# Patient Record
Sex: MALE | Race: WHITE | HISPANIC OR LATINO | Employment: OTHER | URBAN - METROPOLITAN AREA
[De-identification: names, ages, dates, MRNs, and addresses within clinical notes are randomized per-mention and may not be internally consistent; named-entity substitution may affect disease eponyms.]

---

## 2017-01-13 ENCOUNTER — TELEPHONE (OUTPATIENT)
Dept: MEDICAL GROUP | Facility: PHYSICIAN GROUP | Age: 67
End: 2017-01-13

## 2017-01-13 DIAGNOSIS — I82.4Y9 ACUTE DEEP VEIN THROMBOSIS (DVT) OF PROXIMAL VEIN OF LOWER EXTREMITY, UNSPECIFIED LATERALITY (HCC): ICD-10-CM

## 2017-01-13 NOTE — TELEPHONE ENCOUNTER
Okay refill is been sent to pharmacy. Recommend that he attempted to get up today, if he has any difficulty will need to get him some samples until he can get it filled.

## 2017-01-13 NOTE — TELEPHONE ENCOUNTER
1. Caller Name: Ravindra Palma                                           Call Back Number: [phone] (home)         Patient approves a detailed voicemail message: N\A    Pt states he takes Xerelto and cannot get prescribing doctor to refill. He was told to have his PCP refill it.  He states he has 1 pill left

## 2017-01-20 ENCOUNTER — ANTICOAGULATION VISIT (OUTPATIENT)
Dept: MEDICAL GROUP | Facility: PHYSICIAN GROUP | Age: 67
End: 2017-01-20
Payer: MEDICARE

## 2017-01-20 DIAGNOSIS — I82.4Y9 ACUTE DEEP VEIN THROMBOSIS (DVT) OF PROXIMAL VEIN OF LOWER EXTREMITY, UNSPECIFIED LATERALITY (HCC): ICD-10-CM

## 2017-01-20 LAB — INR PPP: 1.1 (ref 2–3.5)

## 2017-01-20 PROCEDURE — 99211 OFF/OP EST MAY X REQ PHY/QHP: CPT | Performed by: FAMILY MEDICINE

## 2017-01-20 PROCEDURE — 85610 PROTHROMBIN TIME: CPT | Performed by: FAMILY MEDICINE

## 2017-01-20 NOTE — MR AVS SNAPSHOT
Ravindra Palma   2017 11:30 AM   Anticoagulation Visit   MRN: [id number]    Department:  [facility]   Dept Phone:  [phone]    Description:  Male : 1950   Provider:  Isai Berry, PHARMD           Allergies as of 2017     Allergen Noted Reactions    Nkda [No Known Drug Allergy] 2012         You were diagnosed with     Acute deep vein thrombosis (DVT) of proximal vein of lower extremity, unspecified laterality (CMS-HCC)   [4610435]         Vital Signs     Smoking Status                   Former Smoker           Basic Information     Date Of Birth Sex Race Ethnicity Preferred Language    1950 Male  or   Origin (Uzbek,South Sudanese,Bolivian,Nam, etc) English      Your appointments     Mar 31, 2017 11:30 AM   Anti-Coag Routine with [place] PHARMACIST   [facility] ([address] [address]   [phone]              Problem List              ICD-10-CM Priority Class Noted - Resolved    Cervical stenosis of spine M48.02   2012 - Present    Spinal stenosis in cervical region M48.02   2012 - Present    Type II or unspecified type diabetes mellitus without mention of complication, uncontrolled E11.65   2013 - Present    Hypothyroidism E03.9   2013 - Present    Dyslipidemia E78.5   2013 - Present    Former smoker Z87.891   2016 - Present    Non compliance with medical treatment Z91.19   2016 - Present    Deep vein thrombosis (HCC) [I82.409] I82.409   2016 - Present      Health Maintenance        Date Due Completion Dates    DIABETES MONOFILAMENT / LE EXAM 1950 ---    RETINAL SCREENING 3/4/1966 ---    IMM DTaP/Tdap/Td Vaccine (1 - Tdap) 3/4/1967 ---    COLONOSCOPY 3/4/1998 ---    IMM ZOSTER VACCINE 3/4/2008 ---    IMM PNEUMOCOCCAL 65+ (ADULT) LOW/MEDIUM RISK SERIES (1 of 2 - PCV13) 3/4/2013 ---    URINE ACR / MICROALBUMIN 2014,  8/17/2012, 4/27/2012    IMM INFLUENZA (1) 9/1/2016 10/1/2012    A1C SCREENING 6/12/2017 12/12/2016, 1/29/2013, 8/17/2012    FASTING LIPID PROFILE 12/12/2017 12/12/2016, 1/29/2013, 11/12/2012, 8/17/2012, 4/27/2012    SERUM CREATININE 12/12/2017 12/12/2016, 1/29/2013, 1/12/2013, 12/14/2012, 11/12/2012, 8/17/2012, 4/27/2012            Results     POCT Protime      Component    INR    1.1    Comment:     139 818-12 11/2017 ic valid                        Current Immunizations     Influenza TIV (IM) 10/1/2012      Below and/or attached are the medications your provider expects you to take. Review all of your home medications and newly ordered medications with your provider and/or pharmacist. Follow medication instructions as directed by your provider and/or pharmacist. Please keep your medication list with you and share with your provider. Update the information when medications are discontinued, doses are changed, or new medications (including over-the-counter products) are added; and carry medication information at all times in the event of emergency situations     Allergies:  NKDA - (reactions not documented)               Medications  Valid as of: January 20, 2017 - 11:40 AM    Generic Name Brand Name Tablet Size Instructions for use    Blood Glucose Monitoring Suppl (Misc) Blood Glucose Monitoring Suppl SUPPLIES Test strips order: Test strips for One Touch Ultra 2 meter. Sig: use BID and prn ssx high or low sugar.        Cholecalciferol   Take  by mouth every day. Unsure of dose         Gabapentin (Cap) NEURONTIN 300 MG Take 300 mg by mouth 3 times a day. 1 in the AM, 1 afternoon, and 2 at HS         Glimepiride (Tab) AMARYL 2 MG Take 1 Tab by mouth every morning.        Lancets (Misc) Lancets  Lancets order: Lancets for One Touch Ultra 2 meter. Sig: use BID and prn ssx high or low sugar.        Levothyroxine Sodium (Tab) SYNTHROID 88 MCG Take 1 Tab by mouth Every morning on an empty stomach.        Lisinopril (Tab)  PRINIVIL 2.5 MG Take 1 Tab by mouth every day.        MetFORMIN HCl (Tab) GLUCOPHAGE 1000 MG Take 1 Tab by mouth 2 times a day, with meals.        Rivaroxaban (Tab) XARELTO 20 MG Take 1 Tab by mouth with dinner.        Simvastatin (Tab) ZOCOR 20 MG Take 1 Tab by mouth every evening.        .                 Medicines prescribed today were sent to:     [facility] [address] [place] [address], [place] [facility]    [address] [facility] [place] [address]    Phone: [phone] Fax: [phone]    Open 24 Hours?: No      Medication refill instructions:       If your prescription bottle indicates you have medication refills left, it is not necessary to call your provider’s office. Please contact your pharmacy and they will refill your medication.    If your prescription bottle indicates you do not have any refills left, you may request refills at any time through one of the following ways: The online Multimedia Plus | QuizScore system (except Urgent Care), by calling your provider’s office, or by asking your pharmacy to contact your provider’s office with a refill request. Medication refills are processed only during regular business hours and may not be available until the next business day. Your provider may request additional information or to have a follow-up visit with you prior to refilling your medication.   *Please Note: Medication refills are assigned a new Rx number when refilled electronically. Your pharmacy may indicate that no refills were authorized even though a new prescription for the same medication is available at the pharmacy. Please request the medicine by name with the pharmacy before contacting your provider for a refill.        Warfarin Dosing Calendar   January 2017 Details    Sun Mon Tue Wed Thu Fri Sat     1               2               3               4               5               6               7                 8               9               10               11               12               13                 14                 15               16               17               18               19               20   1.1      See details      21                 22               23               24               25               26               27               28                 29               30               31                    Date Details   01/20 This INR check   INR: 1.1   139 818-12 11/2017 ic valid                 Warfarin Dosing Calendar   February 2017 Details    Sun Mon Tue Wed Thu Fri Sat        1               2               3               4                 5               6               7               8               9               10               11                 12               13               14               15               16               17               18                 19               20               21               22               23               24               25                 26               27               28                    Date Details   No additional details              Warfarin Dosing Calendar   March 2017 Details    Sun Mon Tue Wed Thu Fri Sat        1               2               3               4                 5               6               7               8               9               10               11                 12               13               14               15               16               17               18                 19               20               21               22               23               24               25                 26               27               28               29               30               31                 Date Details   No additional details    Date of next INR:  3/31/2017              happin! Access Code: [email or web address]  Expires: 1/27/2017  9:05 AM    happin!  A secure, online tool to manage your health information     [employer]’s happin!® is a secure, online tool that connects you  to your personalized health information from the privacy of your home -- day or night - making it very easy for you to manage your healthcare. Once the activation process is completed, you can even access your medical information using the Apcera alize, which is available for free in the Apple Alize store or Google Play store.     Apcera provides the following levels of access (as shown below):   My Chart Features   [facility] Doctor [facility]  Specialists [facility]  Urgent  Care Non-[facility]  Primary Care  Doctor   Email your healthcare team securely and privately 24/7 X X X    Manage appointments: schedule your next appointment; view details of past/upcoming appointments X      Request prescription refills. X      View recent personal medical records, including lab and immunizations X X X X   View health record, including health history, allergies, medications X X X X   Read reports about your outpatient visits, procedures, consult and ER notes X X X X   See your discharge summary, which is a recap of your hospital and/or ER visit that includes your diagnosis, lab results, and care plan. X X       How to register for Apcera:  1. Go to  [email or web address].  2. Click on the Sign Up Now box, which takes you to the New Member Sign Up page. You will need to provide the following information:  a. Enter your Apcera Access Code exactly as it appears at the top of this page. (You will not need to use this code after you’ve completed the sign-up process. If you do not sign up before the expiration date, you must request a new code.)   b. Enter your date of birth.   c. Enter your home email address.   d. Click Submit, and follow the next screen’s instructions.  3. Create a Apcera ID. This will be your Apcera login ID and cannot be changed, so think of one that is secure and easy to remember.  4. Create a Apcera password. You can change your password at any time.  5. Enter your Password Reset Question and Answer.  This can be used at a later time if you forget your password.   6. Enter your e-mail address. This allows you to receive e-mail notifications when new information is available in NVELO.  7. Click Sign Up. You can now view your health information.    For assistance activating your NVELO account, call [phone]

## 2017-01-20 NOTE — Clinical Note
Gen Godfrey, This patient will complete three months therapy 3-12-17.  I asked that he call and schedule f/u with you.  I let him know that a f/u US will need to be done as well.  Thanks. Isai

## 2017-01-20 NOTE — PROGRESS NOTES
Anticoagulation Summary as of 1/20/2017     INR goal    Selected INR 1.1 (1/20/2017)   Maintenance plan No maintenance plan   Plan last modified Isai Berry, PHARMD (12/9/2016)   Next INR check 3/31/2017   Target end date 3/12/2017    Indications   Deep vein thrombosis (HCC) [I82.409] [I82.409]         Anticoagulation Episode Summary     INR check location     Preferred lab     Send INR reminders to     Comments       Anticoagulation Care Providers     Provider Role Specialty Phone number    GINNY Roth Referring Family Medicine [phone]    [facility] Responsible  [phone]        Anticoagulation Patient Findings    Target end date:3/12/2017    Health Status Since Last Assessment   Patient denies any new relevant medical problems, ED visits or hospitalizations   Patient denies any embolic events (stroke/tia/systemic embolism)    Adherence with DOAC Therapy   Pt has NO missed any doses in the average week    Bleeding Risk Assessment     Negative Epistaxis   Pt denies any excessive or unusual bleeding/hematomas.  Pt denies any GI bleeds or hematemesis.  Pt denies any concerning daily headache or sub dural hematoma symptoms.     Pt denies any hematuria or abnormal vaginal bleeding.   Latest Hemoglobin WNL   ETOH overuse Negative     Creatinine Clearance/Renal Function     Latest ClCr >50 mL/min     Drug Interactions   ASA/other antiplatelets Negative   NSAID Negative   Other drug interactions Negative    Examination   Blood Pressure WNL   Symptomatic hypotension Negative   Significant gait impairment/imbalance/high risk for falls?     Final Assessment and Recommendations:   Patient appears stable from the anticoagulation staindpoint.     Benefits of continued DOAC therapy ouyweigh risks for this patient   Recommend pt continue with current DOAC therapy Xarelto 20mg one time daily (with dose adjustment)     Other Actions:  Will need f/u US and visit with PCP to determine LOT,  he will call to schedule    Follow up:   In clinic in two months depending on LOT, visit scheduled for Friday March 31, 2017 @ 11:30am    Isai Berry, JUANISD

## 2017-02-23 ENCOUNTER — OFFICE VISIT (OUTPATIENT)
Dept: MEDICAL GROUP | Facility: PHYSICIAN GROUP | Age: 67
End: 2017-02-23
Payer: MEDICARE

## 2017-02-23 VITALS
HEART RATE: 70 BPM | DIASTOLIC BLOOD PRESSURE: 78 MMHG | BODY MASS INDEX: 33.66 KG/M2 | SYSTOLIC BLOOD PRESSURE: 138 MMHG | OXYGEN SATURATION: 98 % | RESPIRATION RATE: 20 BRPM | HEIGHT: 65 IN | WEIGHT: 202 LBS | TEMPERATURE: 98.4 F

## 2017-02-23 DIAGNOSIS — J01.00 ACUTE NON-RECURRENT MAXILLARY SINUSITIS: Primary | ICD-10-CM

## 2017-02-23 PROCEDURE — 1100F PTFALLS ASSESS-DOCD GE2>/YR: CPT | Performed by: NURSE PRACTITIONER

## 2017-02-23 PROCEDURE — 3288F FALL RISK ASSESSMENT DOCD: CPT | Mod: 8P | Performed by: NURSE PRACTITIONER

## 2017-02-23 PROCEDURE — 0518F FALL PLAN OF CARE DOCD: CPT | Mod: 8P | Performed by: NURSE PRACTITIONER

## 2017-02-23 PROCEDURE — G8484 FLU IMMUNIZE NO ADMIN: HCPCS | Performed by: NURSE PRACTITIONER

## 2017-02-23 PROCEDURE — G8432 DEP SCR NOT DOC, RNG: HCPCS | Performed by: NURSE PRACTITIONER

## 2017-02-23 PROCEDURE — 1036F TOBACCO NON-USER: CPT | Performed by: NURSE PRACTITIONER

## 2017-02-23 PROCEDURE — 4040F PNEUMOC VAC/ADMIN/RCVD: CPT | Mod: 8P | Performed by: NURSE PRACTITIONER

## 2017-02-23 PROCEDURE — G8419 CALC BMI OUT NRM PARAM NOF/U: HCPCS | Performed by: NURSE PRACTITIONER

## 2017-02-23 PROCEDURE — 3017F COLORECTAL CA SCREEN DOC REV: CPT | Mod: 1P | Performed by: NURSE PRACTITIONER

## 2017-02-23 PROCEDURE — 99214 OFFICE O/P EST MOD 30 MIN: CPT | Performed by: NURSE PRACTITIONER

## 2017-02-23 RX ORDER — AZITHROMYCIN 250 MG/1
TABLET, FILM COATED ORAL
Qty: 6 TAB | Refills: 0 | Status: SHIPPED | OUTPATIENT
Start: 2017-02-23 | End: 2017-03-24

## 2017-02-23 RX ORDER — ALBUTEROL SULFATE 90 UG/1
2 AEROSOL, METERED RESPIRATORY (INHALATION) EVERY 6 HOURS PRN
Qty: 8.5 G | Refills: 3 | Status: SHIPPED | OUTPATIENT
Start: 2017-02-23 | End: 2017-04-24 | Stop reason: SDUPTHER

## 2017-02-23 ASSESSMENT — ENCOUNTER SYMPTOMS: COUGH: 1

## 2017-02-23 NOTE — MR AVS SNAPSHOT
"        Ravindra Morejonoval   2017 9:00 AM   Office Visit   MRN: [id number]    Department:  [facility]   Dept Phone:  [phone]    Description:  Male : 1950   Provider:  GINNY Roth           Reason for Visit     Cough with phlegm     Sinus Problem           Allergies as of 2017     Allergen Noted Reactions    Nkda [No Known Drug Allergy] 2012         You were diagnosed with     Acute non-recurrent maxillary sinusitis   [7067046]  -  Primary       Vital Signs     Blood Pressure Pulse Temperature Respirations Height Weight    138/78 mmHg 70 36.9 °C (98.4 °F) 20 1.651 m (5' 5\") 91.627 kg (202 lb)    Body Mass Index Oxygen Saturation Smoking Status             33.61 kg/m2 98% Former Smoker         Basic Information     Date Of Birth Sex Race Ethnicity Preferred Language    1950 Male  or   Origin (Chadian,Ugandan,Norwegian,Nam, etc) English      Your appointments     Mar 31, 2017 11:30 AM   Anti-Coag Routine with [place] [facility]ARMACIST   [facility] [place]    [address] [place] [address]   [phone]              Problem List              ICD-10-CM Priority Class Noted - Resolved    Cervical stenosis of spine M48.02   2012 - Present    Spinal stenosis in cervical region M48.02   2012 - Present    Type II or unspecified type diabetes mellitus without mention of complication, uncontrolled E11.65   2013 - Present    Hypothyroidism E03.9   2013 - Present    Dyslipidemia E78.5   2013 - Present    Former smoker Z87.891   2016 - Present    Non compliance with medical treatment Z91.19   2016 - Present    Deep vein thrombosis (HCC) [I82.409] I82.409   2016 - Present      Health Maintenance        Date Due Completion Dates    DIABETES MONOFILAMENT / LE EXAM 1950 ---    RETINAL SCREENING 3/4/1966 ---    IMM DTaP/Tdap/Td Vaccine (1 - Tdap) 3/4/1967 ---    COLONOSCOPY " 3/4/1998 ---    IMM ZOSTER VACCINE 3/4/2008 ---    IMM PNEUMOCOCCAL 65+ (ADULT) LOW/MEDIUM RISK SERIES (1 of 2 - PCV13) 3/4/2013 ---    URINE ACR / MICROALBUMIN 1/29/2014 1/29/2013, 8/17/2012, 4/27/2012    IMM INFLUENZA (1) 9/1/2016 10/1/2012    A1C SCREENING 6/12/2017 12/12/2016, 1/29/2013, 8/17/2012    FASTING LIPID PROFILE 12/12/2017 12/12/2016, 1/29/2013, 11/12/2012, 8/17/2012, 4/27/2012    SERUM CREATININE 12/12/2017 12/12/2016, 1/29/2013, 1/12/2013, 12/14/2012, 11/12/2012, 8/17/2012, 4/27/2012            Current Immunizations     Influenza TIV (IM) 10/1/2012      Below and/or attached are the medications your provider expects you to take. Review all of your home medications and newly ordered medications with your provider and/or pharmacist. Follow medication instructions as directed by your provider and/or pharmacist. Please keep your medication list with you and share with your provider. Update the information when medications are discontinued, doses are changed, or new medications (including over-the-counter products) are added; and carry medication information at all times in the event of emergency situations     Allergies:  NKDA - (reactions not documented)               Medications  Valid as of: February 23, 2017 - 10:03 AM    Generic Name Brand Name Tablet Size Instructions for use    Albuterol Sulfate (Aero Soln) albuterol 108 (90 BASE) MCG/ACT Inhale 2 Puffs by mouth every 6 hours as needed for Shortness of Breath.        Azithromycin (Tab) ZITHROMAX 250 MG Take 500mg day 1, then 250mg days 2-5.        Blood Glucose Monitoring Suppl (Misc) Blood Glucose Monitoring Suppl SUPPLIES Test strips order: Test strips for One Touch Ultra 2 meter. Sig: use BID and prn ssx high or low sugar.        Cholecalciferol   Take  by mouth every day. Unsure of dose         Gabapentin (Cap) NEURONTIN 300 MG Take 300 mg by mouth 3 times a day. 1 in the AM, 1 afternoon, and 2 at HS         Glimepiride (Tab) AMARYL 2 MG Take  1 Tab by mouth every morning.        Lancets (Misc) Lancets  Lancets order: Lancets for One Touch Ultra 2 meter. Sig: use BID and prn ssx high or low sugar.        Levothyroxine Sodium (Tab) SYNTHROID 88 MCG Take 1 Tab by mouth Every morning on an empty stomach.        Lisinopril (Tab) PRINIVIL 2.5 MG Take 1 Tab by mouth every day.        MetFORMIN HCl (Tab) GLUCOPHAGE 1000 MG Take 1 Tab by mouth 2 times a day, with meals.        Rivaroxaban (Tab) XARELTO 20 MG Take 1 Tab by mouth with dinner.        Simvastatin (Tab) ZOCOR 20 MG Take 1 Tab by mouth every evening.        .                 Medicines prescribed today were sent to:     [facility] [address] [place] [address]    Phone: [phone] Fax: [phone]    Open 24 Hours?: No      Medication refill instructions:       If your prescription bottle indicates you have medication refills left, it is not necessary to call your provider’s office. Please contact your pharmacy and they will refill your medication.    If your prescription bottle indicates you do not have any refills left, you may request refills at any time through one of the following ways: The online Artspace system (except Urgent Care), by calling your provider’s office, or by asking your pharmacy to contact your provider’s office with a refill request. Medication refills are processed only during regular business hours and may not be available until the next business day. Your provider may request additional information or to have a follow-up visit with you prior to refilling your medication.   *Please Note: Medication refills are assigned a new Rx number when refilled electronically. Your pharmacy may indicate that no refills were authorized even though a new prescription for the same medication is available at the pharmacy. Please request the medicine by name with the pharmacy before contacting your provider for a refill.            Mint Access Code: [email or web address]  Expires: 2/25/2017 11:44 AM    Mint  A secure, online tool to manage your health information     [employer]’s Mint® is a secure, online tool that connects you to your personalized health information from the privacy of your home -- day or night - making it very easy for you to manage your healthcare. Once the activation process is completed, you can even access your medical information using the Mint alize, which is available for free in the Apple Alize store or Google Play store.     Mint provides the following levels of access (as shown below):   My Chart Features   [facility] Primary Care Doctor [facility]  Specialists [facility]  Urgent  Care Non-[facility]  Primary Care  Doctor   Email your healthcare team securely and privately 24/7 X X X    Manage appointments: schedule your next appointment; view details of past/upcoming appointments X      Request prescription refills. X      View recent personal medical records, including lab and immunizations X X X X   View health record, including health history, allergies, medications X X X X   Read reports about your outpatient visits, procedures, consult and ER notes X X X X   See your discharge summary, which is a recap of your hospital and/or ER visit that includes your diagnosis, lab results, and care plan. X X       How to register for Mint:  1. Go to  [email or web address].  2. Click on the Sign Up Now box, which takes you to the New Member Sign Up page. You will need to provide the following information:  a. Enter your Mint Access Code exactly as it appears at the top of this page. (You will not need to use this code after you’ve completed the sign-up process. If you do not sign up before the expiration date, you must request a new code.)   b. Enter your date of birth.   c. Enter your home email address.   d. Click Submit, and follow the next screen’s instructions.  3. Create a Mint ID. This will be your Mint  login ID and cannot be changed, so think of one that is secure and easy to remember.  4. Create a BreconRidge password. You can change your password at any time.  5. Enter your Password Reset Question and Answer. This can be used at a later time if you forget your password.   6. Enter your e-mail address. This allows you to receive e-mail notifications when new information is available in BreconRidge.  7. Click Sign Up. You can now view your health information.    For assistance activating your BreconRidge account, call [phone]

## 2017-02-23 NOTE — PROGRESS NOTES
"Subjective:      Ravindra Palma is a 66 y.o. male who presents with Cough and Sinus Problem            Cough    Sinus Problem  Associated symptoms include coughing.   Ravindra is here today to discuss the following problem:    1. Acute non-recurrent maxillary sinusitis  Patient states that both his grandson and wife recently seen for similar symptoms and her taking medication.  Patient reports proximal and one week of worsening cough, nasal congestion and sore throat.  He does report low-grade fever, although he has not measured it with a thermometer.  He reports thick, discolored mucus throughout the day.  He also reports intermittent left ear pain.  He has taken over-the-counter TheraFlu, Kenzie-Chester and cough drops with mild, temporary relief.  He denies any chest pain or shortness of breath.    Active Ambulatory Problems     Diagnosis Date Noted   • Cervical stenosis of spine 12/17/2012   • Spinal stenosis in cervical region 12/26/2012   • Type II or unspecified type diabetes mellitus without mention of complication, uncontrolled 01/12/2013   • Hypothyroidism 01/12/2013   • Dyslipidemia 01/12/2013   • Former smoker 12/07/2016   • Non compliance with medical treatment 12/07/2016   • Deep vein thrombosis (HCC) [I82.409] 12/09/2016     Resolved Ambulatory Problems     Diagnosis Date Noted   • No Resolved Ambulatory Problems     Past Medical History   Diagnosis Date   • Diabetes    • Unspecified disorder of thyroid    • Pain 12-14-12   • High cholesterol          Review of Systems   Respiratory: Positive for cough.         See HPI          Objective:     /78 mmHg  Pulse 70  Temp(Src) 36.9 °C (98.4 °F)  Resp 20  Ht 1.651 m (5' 5\")  Wt 91.627 kg (202 lb)  BMI 33.61 kg/m2  SpO2 98%     Physical Exam   Constitutional: He is oriented to person, place, and time. He appears well-developed and well-nourished.   HENT:   Right Ear: Tympanic membrane is erythematous.   Left Ear: Tympanic membrane is " erythematous.   Nose: Right sinus exhibits maxillary sinus tenderness. Left sinus exhibits maxillary sinus tenderness.   Mouth/Throat: Posterior oropharyngeal edema and posterior oropharyngeal erythema present. No oropharyngeal exudate.   Neck: Normal range of motion. Neck supple.   Cardiovascular: Normal rate.    Pulmonary/Chest: Effort normal and breath sounds normal. No respiratory distress. He has no wheezes.   Neurological: He is alert and oriented to person, place, and time.   Skin: Skin is warm and dry.   Psychiatric: He has a normal mood and affect. His behavior is normal.   Nursing note and vitals reviewed.              Assessment/Plan:     1. Acute non-recurrent maxillary sinusitis  Treat empirically for acute sinusitis  Encouraged continued use of the OTC remedies for symptom relief  RTC if symptoms worsen or fail to resolve.    - azithromycin (ZITHROMAX) 250 MG Tab; Take 500mg day 1, then 250mg days 2-5.  Dispense: 6 Tab; Refill: 0  - albuterol 108 (90 BASE) MCG/ACT Aero Soln inhalation aerosol; Inhale 2 Puffs by mouth every 6 hours as needed for Shortness of Breath.  Dispense: 8.5 g; Refill: 3

## 2017-03-20 RX ORDER — LISINOPRIL 2.5 MG/1
TABLET ORAL
Qty: 90 TAB | Refills: 1 | Status: SHIPPED | OUTPATIENT
Start: 2017-03-20 | End: 2017-09-11 | Stop reason: SDUPTHER

## 2017-03-20 NOTE — TELEPHONE ENCOUNTER
Refill X 6 months, sent to pharmacy.Pt. Seen in the last 6 months per protocol.   Lab Results   Component Value Date/Time    SODIUM 136 12/12/2016 11:08 AM    POTASSIUM 4.0 12/12/2016 11:08 AM    CHLORIDE 100 12/12/2016 11:08 AM    CO2 28 12/12/2016 11:08 AM    GLUCOSE 300* 12/12/2016 11:08 AM    BUN 19 12/12/2016 11:08 AM    CREATININE 0.77 12/12/2016 11:08 AM

## 2017-03-20 NOTE — TELEPHONE ENCOUNTER
Was the patient seen in the last year in this department? Yes     Does patient have an active prescription for medications requested? No     Received Request Via: Pharmacy      Pt met protocol?: Yes    LAST OV 02/23/17    BP Readings from Last 1 Encounters:   02/23/17 138/78

## 2017-03-23 ENCOUNTER — HOSPITAL ENCOUNTER (OUTPATIENT)
Dept: LAB | Facility: MEDICAL CENTER | Age: 67
End: 2017-03-23
Attending: NURSE PRACTITIONER
Payer: MEDICARE

## 2017-03-23 DIAGNOSIS — E03.8 OTHER SPECIFIED HYPOTHYROIDISM: ICD-10-CM

## 2017-03-23 DIAGNOSIS — E78.5 DYSLIPIDEMIA: ICD-10-CM

## 2017-03-23 LAB
ALBUMIN SERPL BCP-MCNC: 4.2 G/DL (ref 3.2–4.9)
ALBUMIN/GLOB SERPL: 1.8 G/DL
ALP SERPL-CCNC: 41 U/L (ref 30–99)
ALT SERPL-CCNC: 18 U/L (ref 2–50)
ANION GAP SERPL CALC-SCNC: 6 MMOL/L (ref 0–11.9)
AST SERPL-CCNC: 12 U/L (ref 12–45)
BILIRUB SERPL-MCNC: 0.9 MG/DL (ref 0.1–1.5)
BUN SERPL-MCNC: 19 MG/DL (ref 8–22)
CALCIUM SERPL-MCNC: 9.3 MG/DL (ref 8.5–10.5)
CHLORIDE SERPL-SCNC: 105 MMOL/L (ref 96–112)
CHOLEST SERPL-MCNC: 120 MG/DL (ref 100–199)
CO2 SERPL-SCNC: 27 MMOL/L (ref 20–33)
CREAT SERPL-MCNC: 0.7 MG/DL (ref 0.5–1.4)
CREAT UR-MCNC: 132.2 MG/DL
EST. AVERAGE GLUCOSE BLD GHB EST-MCNC: 163 MG/DL
GLOBULIN SER CALC-MCNC: 2.4 G/DL (ref 1.9–3.5)
GLUCOSE SERPL-MCNC: 198 MG/DL (ref 65–99)
HBA1C MFR BLD: 7.3 % (ref 0–5.6)
HDLC SERPL-MCNC: 47 MG/DL
LDLC SERPL CALC-MCNC: 50 MG/DL
MICROALBUMIN UR-MCNC: 1.3 MG/DL
MICROALBUMIN/CREAT UR: 10 MG/G (ref 0–30)
POTASSIUM SERPL-SCNC: 3.9 MMOL/L (ref 3.6–5.5)
PROT SERPL-MCNC: 6.6 G/DL (ref 6–8.2)
SODIUM SERPL-SCNC: 138 MMOL/L (ref 135–145)
T4 FREE SERPL-MCNC: 1.01 NG/DL (ref 0.53–1.43)
TRIGL SERPL-MCNC: 114 MG/DL (ref 0–149)
TSH SERPL DL<=0.005 MIU/L-ACNC: 5.19 UIU/ML (ref 0.3–3.7)

## 2017-03-23 PROCEDURE — 80053 COMPREHEN METABOLIC PANEL: CPT

## 2017-03-23 PROCEDURE — 84443 ASSAY THYROID STIM HORMONE: CPT

## 2017-03-23 PROCEDURE — 82043 UR ALBUMIN QUANTITATIVE: CPT

## 2017-03-23 PROCEDURE — 36415 COLL VENOUS BLD VENIPUNCTURE: CPT

## 2017-03-23 PROCEDURE — 84439 ASSAY OF FREE THYROXINE: CPT

## 2017-03-23 PROCEDURE — 83036 HEMOGLOBIN GLYCOSYLATED A1C: CPT | Mod: GA

## 2017-03-23 PROCEDURE — 82570 ASSAY OF URINE CREATININE: CPT

## 2017-03-23 PROCEDURE — 80061 LIPID PANEL: CPT

## 2017-03-24 ENCOUNTER — TELEPHONE (OUTPATIENT)
Dept: MEDICAL GROUP | Facility: PHYSICIAN GROUP | Age: 67
End: 2017-03-24

## 2017-03-24 DIAGNOSIS — E11.8 CONTROLLED TYPE 2 DIABETES MELLITUS WITH COMPLICATION, WITHOUT LONG-TERM CURRENT USE OF INSULIN (HCC): Primary | ICD-10-CM

## 2017-03-24 DIAGNOSIS — E03.8 OTHER SPECIFIED HYPOTHYROIDISM: ICD-10-CM

## 2017-03-24 DIAGNOSIS — E78.5 DYSLIPIDEMIA: ICD-10-CM

## 2017-03-24 RX ORDER — LEVOTHYROXINE SODIUM 0.1 MG/1
100 TABLET ORAL
Qty: 90 TAB | Refills: 1 | Status: SHIPPED | OUTPATIENT
Start: 2017-03-24 | End: 2017-09-11 | Stop reason: SDUPTHER

## 2017-03-24 NOTE — TELEPHONE ENCOUNTER
----- Message from GINNY Roth sent at 3/24/2017  7:09 AM PDT -----  Labs are looking great!!  Cholesterol is back to normal (controlled) with your current medication.  Also, blood sugar has improved significantly!  The only thing I need to adjust is the thyroid medication.  It remains slightly underactive.  I will send over a new dose of the levothyroxine.  No other changes to medications at this time.  Plan on repeating labs in 3 months.

## 2017-03-31 ENCOUNTER — ANTICOAGULATION VISIT (OUTPATIENT)
Dept: MEDICAL GROUP | Facility: PHYSICIAN GROUP | Age: 67
End: 2017-03-31
Payer: MEDICARE

## 2017-03-31 VITALS — SYSTOLIC BLOOD PRESSURE: 142 MMHG | DIASTOLIC BLOOD PRESSURE: 93 MMHG | HEART RATE: 68 BPM

## 2017-03-31 DIAGNOSIS — I82.4Y9 ACUTE DEEP VEIN THROMBOSIS (DVT) OF PROXIMAL VEIN OF LOWER EXTREMITY, UNSPECIFIED LATERALITY (HCC): ICD-10-CM

## 2017-03-31 LAB — INR PPP: 1.1 (ref 2–3.5)

## 2017-03-31 PROCEDURE — 4040F PNEUMOC VAC/ADMIN/RCVD: CPT | Mod: 8P | Performed by: FAMILY MEDICINE

## 2017-03-31 PROCEDURE — 85610 PROTHROMBIN TIME: CPT | Performed by: FAMILY MEDICINE

## 2017-03-31 PROCEDURE — 99999 PR NO CHARGE: CPT | Performed by: FAMILY MEDICINE

## 2017-03-31 PROCEDURE — G8417 CALC BMI ABV UP PARAM F/U: HCPCS | Performed by: FAMILY MEDICINE

## 2017-03-31 NOTE — PROGRESS NOTES
Anticoagulation Summary as of 3/31/2017     INR goal    Selected INR 1.1 (3/31/2017)   Maintenance plan No maintenance plan   Plan last modified Isai Berry, PHARMD (12/9/2016)   Next INR check 9/15/2017   Target end date 3/12/2017    Indications   Deep vein thrombosis (HCC) [I82.409] [I82.409]         Anticoagulation Episode Summary     INR check location     Preferred lab     Send INR reminders to     Comments       Anticoagulation Care Providers     Provider Role Specialty Phone number    GINNY Roth Referring Family Medicine [phone]    [facility] Responsible  [phone]        Anticoagulation Patient Findings       Target end date:3-     Indication: DVT     Drug: Xarelto    Health Status Since Last Assessment   Patient denies any new relevant medical problems, ED visits or hospitalizations   Patient denies any embolic events (stroke/tia/systemic embolism)    Adherence with DOAC Therapy   Pt has NO missed any doses in the average week    Bleeding Risk Assessment     Negative Epistaxis   Pt denies any excessive or unusual bleeding/hematomas.  Pt denies any GI bleeds or hematemesis.  Pt denies any concerning daily headache or sub dural hematoma symptoms.     Pt denies any hematuria or abnormal vaginal bleeding.   Latest Hemoglobin Nothing recent   ETOH overuse Negative     Creatinine Clearance/Renal Function     Latest ClCr >50 mL/min     Drug Interactions   ASA/other antiplatelets Negative   NSAID Negative   Other drug interactions Negative    Examination   Blood Pressure 148/93   Symptomatic hypotension Negative   Significant gait impairment/imbalance/high risk for falls? Negative    Final Assessment and Recommendations:   Patient appears stable from the anticoagulation staindpoint.     Benefits of continued DOAC therapy outweigh risks for this patient   Recommend pt continue with current DOAC therapy Xarelto 20 mg one time daily (with dose adjustment)     Other  Actions:  Will f/u up with PCP to order US and EOT determination    Follow up:   Will follow up with patient via telephone in 6 months.  I have added this patient to my list for follow up.    Isai Berry, JUANISD

## 2017-03-31 NOTE — Clinical Note
Gen Godfrey, I saw Ravindra in clinic today, looks like he is at three months therapy with Xarelto following unprovoked DVT.  Would you like to order a f/u ultra sound and have patient back to see you for review of results and determine end of therapy date?  Thanks. Isai

## 2017-03-31 NOTE — MR AVS SNAPSHOT
Ravindra Palma   3/31/2017 11:30 AM   Anticoagulation Visit   MRN: [id number]    Department:  [facility]   Dept Phone:  [phone]    Description:  Male : 1950   Provider:  Isai Berry, PHARMD           Allergies as of 3/31/2017     Allergen Noted Reactions    Nkda [No Known Drug Allergy] 2012         You were diagnosed with     Acute deep vein thrombosis (DVT) of proximal vein of lower extremity, unspecified laterality (CMS-HCC)   [0490618]         Vital Signs     Blood Pressure Pulse Smoking Status             142/93 mmHg 66 Former Smoker         Basic Information     Date Of Birth Sex Race Ethnicity Preferred Language    1950 Male  or   Origin (Pashto,Armenian,Moldovan,Nam, etc) English      Problem List              ICD-10-CM Priority Class Noted - Resolved    Cervical stenosis of spine M48.02   2012 - Present    Spinal stenosis in cervical region M48.02   2012 - Present    Type II or unspecified type diabetes mellitus without mention of complication, uncontrolled E11.65   2013 - Present    Hypothyroidism E03.9   2013 - Present    Dyslipidemia E78.5   2013 - Present    Former smoker Z87.891   2016 - Present    Non compliance with medical treatment Z91.19   2016 - Present    Deep vein thrombosis (HCC) [I82.409] I82.409   2016 - Present      Health Maintenance        Date Due Completion Dates    DIABETES MONOFILAMENT / LE EXAM 1950 ---    RETINAL SCREENING 3/4/1966 ---    IMM DTaP/Tdap/Td Vaccine (1 - Tdap) 3/4/1967 ---    COLONOSCOPY 3/4/1998 ---    IMM ZOSTER VACCINE 3/4/2008 ---    IMM PNEUMOCOCCAL 65+ (ADULT) LOW/MEDIUM RISK SERIES (1 of 2 - PCV13) 3/4/2013 ---    IMM INFLUENZA (1) 2016 10/1/2012    A1C SCREENING 2017 3/23/2017, 2016, 2013, 2012    FASTING LIPID PROFILE 3/23/2018 3/23/2017, 2016, 2013, 2012, 2012, 2012    URINE ACR /  MICROALBUMIN 3/23/2018 3/23/2017, 1/29/2013, 8/17/2012, 4/27/2012    SERUM CREATININE 3/23/2018 3/23/2017, 12/12/2016, 1/29/2013, 1/12/2013, 12/14/2012, 11/12/2012, 8/17/2012, 4/27/2012            Results     POCT Protime      Component    INR    1.1    Comment:     [id number] 2/2018 ic valid                        Current Immunizations     Influenza TIV (IM) 10/1/2012      Below and/or attached are the medications your provider expects you to take. Review all of your home medications and newly ordered medications with your provider and/or pharmacist. Follow medication instructions as directed by your provider and/or pharmacist. Please keep your medication list with you and share with your provider. Update the information when medications are discontinued, doses are changed, or new medications (including over-the-counter products) are added; and carry medication information at all times in the event of emergency situations     Allergies:  NKDA - (reactions not documented)               Medications  Valid as of: March 31, 2017 - 11:45 AM    Generic Name Brand Name Tablet Size Instructions for use    Albuterol Sulfate (Aero Soln) albuterol 108 (90 BASE) MCG/ACT Inhale 2 Puffs by mouth every 6 hours as needed for Shortness of Breath.        Blood Glucose Monitoring Suppl (Misc) Blood Glucose Monitoring Suppl SUPPLIES Test strips order: Test strips for One Touch Ultra 2 meter. Sig: use BID and prn ssx high or low sugar.        Cholecalciferol   Take  by mouth every day. Unsure of dose         Gabapentin (Cap) NEURONTIN 300 MG Take 300 mg by mouth 3 times a day. 1 in the AM, 1 afternoon, and 2 at HS         Glimepiride (Tab) AMARYL 2 MG Take 1 Tab by mouth every morning.        Lancets (Misc) Lancets  Lancets order: Lancets for One Touch Ultra 2 meter. Sig: use BID and prn ssx high or low sugar.        Levothyroxine Sodium (Tab) SYNTHROID 100 MCG Take 1 Tab by mouth Every morning on an empty stomach.        Lisinopril  (Tab) PRINIVIL 2.5 MG TAKE ONE TABLET BY MOUTH ONCE DAILY        MetFORMIN HCl (Tab) GLUCOPHAGE 1000 MG Take 1 Tab by mouth 2 times a day, with meals.        Rivaroxaban (Tab) XARELTO 20 MG Take 1 Tab by mouth with dinner.        Simvastatin (Tab) ZOCOR 20 MG Take 1 Tab by mouth every evening.        .                 Medicines prescribed today were sent to:     [facility] [address] [place] [address] - [address] [facility]    [address] [place] [address]    Phone: [phone] Fax: [phone]    Open 24 Hours?: No      Medication refill instructions:       If your prescription bottle indicates you have medication refills left, it is not necessary to call your provider’s office. Please contact your pharmacy and they will refill your medication.    If your prescription bottle indicates you do not have any refills left, you may request refills at any time through one of the following ways: The online [employer] system (except Urgent Care), by calling your provider’s office, or by asking your pharmacy to contact your provider’s office with a refill request. Medication refills are processed only during regular business hours and may not be available until the next business day. Your provider may request additional information or to have a follow-up visit with you prior to refilling your medication.   *Please Note: Medication refills are assigned a new Rx number when refilled electronically. Your pharmacy may indicate that no refills were authorized even though a new prescription for the same medication is available at the pharmacy. Please request the medicine by name with the pharmacy before contacting your provider for a refill.        Warfarin Dosing Calendar   March 2017 Details    Sun Mon Tue Wed Thu Fri Sat        1               2               3               4                 5               6               7               8               9               10               11                 12                13               14               15               16               17               18                 19               20               21               22               23               24               25                 26               27               28               29               30               31   1.1      See details        Date Details   03/31 This INR check   INR: 1.1   [id number] 2/2018 ic valid                 Warfarin Dosing Calendar   April 2017 Details    Sun Mon Tue Wed Thu Fri Sat           1                 2               3               4               5               6               7               8                 9               10               11               12               13               14               15                 16               17               18               19               20               21               22                 23               24               25               26               27               28               29                 30                      Date Details   No additional details              Warfarin Dosing Calendar   May 2017 Details    Sun Mon Tue Wed Thu Fri Sat      1               2               3               4               5               6                 7               8               9               10               11               12               13                 14               15               16               17               18               19               20                 21               22               23               24               25               26               27                 28               29               30               31                   Date Details   No additional details              Warfarin Dosing Calendar   June 2017 Details    Sun Mon Tue Wed Thu Fri Sat         1               2               3                 4               5               6               7               8                 9               10                 11               12               13               14               15               16               17                 18               19               20               21               22               23               24                 25               26               27               28               29               30                 Date Details   No additional details              Warfarin Dosing Calendar   July 2017 Details    Sun Mon Tue Wed Thu Fri Sat           1                 2               3               4               5               6               7               8                 9               10               11               12               13               14               15                 16               17               18               19               20               21               22                 23               24               25               26               27               28               29                 30               31                     Date Details   No additional details              Warfarin Dosing Calendar   August 2017 Details    Sun Mon Tue Wed Thu Fri Sat       1               2               3               4               5                 6               7               8               9               10               11               12                 13               14               15               16               17               18               19                 20               21               22               23               24               25               26                 27               28               29               30               31                  Date Details   No additional details              Warfarin Dosing Calendar   September 2017 Details    Sun Mon Tue Wed Thu Fri Sat          1               2                 3               4               5                6               7               8               9                 10               11               12               13               14               15               16                 17               18               19               20               21               22               23                 24               25               26               27               28               29               30                Date Details   No additional details    Date of next INR:  9/15/2017              [employer] Access Code: [email or web address]  Expires: 4/24/2017 10:14 AM    Sensdata  A secure, online tool to manage your health information     [employer]’s Sens[employer]® is a secure, online tool that connects you to your personalized health information from the privacy of your home -- day or night - making it very easy for you to manage your healthcare. Once the activation process is completed, you can even access your medical information using the Sensdata alize, which is available for free in the [employer] Alize store or Google Play store.     Sensdata provides the following levels of access (as shown below):   My Chart Features   [facility] Doctor [facility]  Urgent  Care Non-[facility]  Primary Care  Doctor   Email your healthcare team securely and privately 24/7 X X X    Manage appointments: schedule your next appointment; view details of past/upcoming appointments X      Request prescription refills. X      View recent personal medical records, including lab and immunizations X X X X   View health record, including health history, allergies, medications X X X X   Read reports about your outpatient visits, procedures, consult and ER notes X X X X   See your discharge summary, which is a recap of your hospital and/or ER visit that includes your diagnosis, lab results, and care plan. X X       How to register for Sensdata:  1. Go to  [email or web address].  2. Click on the Sign Up Now box, which takes  you to the New Member Sign Up page. You will need to provide the following information:  a. Enter your [employer] Access Code exactly as it appears at the top of this page. (You will not need to use this code after you’ve completed the sign-up process. If you do not sign up before the expiration date, you must request a new code.)   b. Enter your date of birth.   c. Enter your home email address.   d. Click Submit, and follow the next screen’s instructions.  3. Create a [employer] ID. This will be your [employer] login ID and cannot be changed, so think of one that is secure and easy to remember.  4. Create a [employer] password. You can change your password at any time.  5. Enter your Password Reset Question and Answer. This can be used at a later time if you forget your password.   6. Enter your e-mail address. This allows you to receive e-mail notifications when new information is available in [employer].  7. Click Sign Up. You can now view your health information.    For assistance activating your [employer] account, call [phone]

## 2017-04-03 ENCOUNTER — TELEPHONE (OUTPATIENT)
Dept: MEDICAL GROUP | Facility: PHYSICIAN GROUP | Age: 67
End: 2017-04-03

## 2017-04-03 DIAGNOSIS — Z09 ENCOUNTER FOR FOLLOW-UP OF CHRONIC DEEP VEIN THROMBOSIS (DVT) OF RIGHT LOWER EXTREMITY: ICD-10-CM

## 2017-04-03 DIAGNOSIS — Z86.718 ENCOUNTER FOR FOLLOW-UP OF CHRONIC DEEP VEIN THROMBOSIS (DVT) OF RIGHT LOWER EXTREMITY: ICD-10-CM

## 2017-04-03 NOTE — TELEPHONE ENCOUNTER
(Patient) has been notified, and understood.  He will call back to schedule an appointment with Bekah once he finds out the date of the ultrasound.

## 2017-04-03 NOTE — TELEPHONE ENCOUNTER
----- Message from GINNY Roth sent at 4/3/2017  9:57 AM PDT -----  Please contact patient and let him know that we would like to do a follow-up ultrasound of the right leg to re-evaluate the DVT.  Appt after to discuss results and possible discontinuation of the Xarelto.    Thank you  ----- Message -----     From: Isai Berry, PHARMD     Sent: 3/31/2017  11:50 AM       To: GINNY Roth,  I saw Ravindra in clinic today, looks like he is at three months therapy with Xarelto following unprovoked DVT.  Would you like to order a f/u ultra sound and have patient back to see you for review of results and determine end of therapy date?  Thanks.  Isai

## 2017-04-14 ENCOUNTER — TELEPHONE (OUTPATIENT)
Dept: MEDICAL GROUP | Facility: PHYSICIAN GROUP | Age: 67
End: 2017-04-14

## 2017-04-14 NOTE — TELEPHONE ENCOUNTER
Patient should continue on the medication until the results are in on the ultrasound. If he has enough medication to get through that date, he will not need a refill until we know, otherwise should be refilled.

## 2017-04-14 NOTE — TELEPHONE ENCOUNTER
1. Caller Name: Jessy/wife                                         Call Back Number: [phone] (home)         Patient approves a detailed voicemail message: N\A    Pt's wife states pt took his last Xarelto yesterday.  He has appointment for an ultrasound 04/18 to find out if he needs to keep taking medication.  Pt would like to know if he can hold off on getting refilled until after the ultrasound.  Please advise.  Thank you

## 2017-04-18 ENCOUNTER — HOSPITAL ENCOUNTER (OUTPATIENT)
Dept: RADIOLOGY | Facility: MEDICAL CENTER | Age: 67
End: 2017-04-18
Attending: NURSE PRACTITIONER
Payer: MEDICARE

## 2017-04-18 ENCOUNTER — TELEPHONE (OUTPATIENT)
Dept: MEDICAL GROUP | Facility: PHYSICIAN GROUP | Age: 67
End: 2017-04-18

## 2017-04-18 DIAGNOSIS — Z86.718 ENCOUNTER FOR FOLLOW-UP OF CHRONIC DEEP VEIN THROMBOSIS (DVT) OF RIGHT LOWER EXTREMITY: ICD-10-CM

## 2017-04-18 DIAGNOSIS — Z09 ENCOUNTER FOR FOLLOW-UP OF CHRONIC DEEP VEIN THROMBOSIS (DVT) OF RIGHT LOWER EXTREMITY: ICD-10-CM

## 2017-04-18 PROCEDURE — 93971 EXTREMITY STUDY: CPT | Mod: RT

## 2017-04-18 NOTE — TELEPHONE ENCOUNTER
----- Message from GINNY Roth sent at 4/18/2017 12:58 PM PDT -----  No acute findings. 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  Please schedule appt to discuss the need for additional medication.  Thank you

## 2017-04-21 ENCOUNTER — TELEPHONE (OUTPATIENT)
Dept: MEDICAL GROUP | Facility: PHYSICIAN GROUP | Age: 67
End: 2017-04-21

## 2017-04-21 NOTE — TELEPHONE ENCOUNTER
ESTABLISHED PATIENT PRE-VISIT PLANNING     Note: Patient will not be contacted if there is no indication to call.     1.  Reviewed note from last office visit with PCP and/or other med group provider: Yes    2.  If any orders were placed at last visit, do we have Results/Consult Notes?        •  Labs - Labs ordered, completed and results are in chart.       •  Imaging - Imaging ordered, completed and results are in chart.       •  Referrals - No referrals were ordered at last office visit.    3.  Immunizations were updated in Epic using WebIZ?: No WebIZ record       •  Web Iz Recommendations: PREVNAR (PCV13)  TDAP ZOSTAVAX (Shingles)    4.  Patient is due for the following Health Maintenance Topics:   Health Maintenance Due   Topic Date Due   • Annual Wellness Visit  1950   • DIABETES MONOFILAMENT / LE EXAM  1950   • RETINAL SCREENING  03/04/1966   • IMM DTaP/Tdap/Td Vaccine (1 - Tdap) 03/04/1967   • COLONOSCOPY  03/04/1998   • IMM ZOSTER VACCINE  03/04/2008   • IMM PNEUMOCOCCAL 65+ (ADULT) LOW/MEDIUM RISK SERIES (1 of 2 - PCV13) 03/04/2013           5.  Patient was not informed to arrive 15 min prior to their scheduled appointment and bring in their medication bottles.

## 2017-04-24 ENCOUNTER — OFFICE VISIT (OUTPATIENT)
Dept: MEDICAL GROUP | Facility: PHYSICIAN GROUP | Age: 67
End: 2017-04-24
Payer: MEDICARE

## 2017-04-24 VITALS
HEIGHT: 65 IN | HEART RATE: 71 BPM | BODY MASS INDEX: 33.66 KG/M2 | DIASTOLIC BLOOD PRESSURE: 80 MMHG | RESPIRATION RATE: 18 BRPM | TEMPERATURE: 97.5 F | WEIGHT: 202 LBS | SYSTOLIC BLOOD PRESSURE: 136 MMHG | OXYGEN SATURATION: 98 %

## 2017-04-24 DIAGNOSIS — E11.9 CONTROLLED TYPE 2 DIABETES MELLITUS WITHOUT COMPLICATION, WITHOUT LONG-TERM CURRENT USE OF INSULIN (HCC): ICD-10-CM

## 2017-04-24 DIAGNOSIS — J45.20 MILD INTERMITTENT ASTHMA WITHOUT COMPLICATION: ICD-10-CM

## 2017-04-24 DIAGNOSIS — I82.511 CHRONIC DEEP VEIN THROMBOSIS (DVT) OF FEMORAL VEIN OF RIGHT LOWER EXTREMITY (HCC): Primary | ICD-10-CM

## 2017-04-24 DIAGNOSIS — E78.5 DYSLIPIDEMIA: ICD-10-CM

## 2017-04-24 PROCEDURE — G8417 CALC BMI ABV UP PARAM F/U: HCPCS | Performed by: NURSE PRACTITIONER

## 2017-04-24 PROCEDURE — 1100F PTFALLS ASSESS-DOCD GE2>/YR: CPT | Performed by: NURSE PRACTITIONER

## 2017-04-24 PROCEDURE — 0518F FALL PLAN OF CARE DOCD: CPT | Mod: 8P | Performed by: NURSE PRACTITIONER

## 2017-04-24 PROCEDURE — 3288F FALL RISK ASSESSMENT DOCD: CPT | Mod: 8P | Performed by: NURSE PRACTITIONER

## 2017-04-24 PROCEDURE — 99214 OFFICE O/P EST MOD 30 MIN: CPT | Performed by: NURSE PRACTITIONER

## 2017-04-24 PROCEDURE — 3045F PR MOST RECENT HEMOGLOBIN A1C LEVEL 7.0-9.0%: CPT | Performed by: NURSE PRACTITIONER

## 2017-04-24 PROCEDURE — 4040F PNEUMOC VAC/ADMIN/RCVD: CPT | Mod: 8P | Performed by: NURSE PRACTITIONER

## 2017-04-24 PROCEDURE — 1036F TOBACCO NON-USER: CPT | Performed by: NURSE PRACTITIONER

## 2017-04-24 PROCEDURE — G8432 DEP SCR NOT DOC, RNG: HCPCS | Performed by: NURSE PRACTITIONER

## 2017-04-24 RX ORDER — ALBUTEROL SULFATE 90 UG/1
2 AEROSOL, METERED RESPIRATORY (INHALATION) EVERY 6 HOURS PRN
Qty: 8.5 G | Refills: 3 | Status: ON HOLD | OUTPATIENT
Start: 2017-04-24 | End: 2021-11-18

## 2017-04-24 NOTE — PROGRESS NOTES
Chief Complaint   Patient presents with   • Follow-Up     US       HISTORY OF PRESENT ILLNESS: Patient is a 67 y.o. male established patient who presents today to discuss the followin. Chronic deep vein thrombosis (DVT) of femoral vein of right lower extremity (CMS-HCC)  Patient developed acute right leg pain several months ago.  Was determined that he had DVT and was started on Xarelto.  He continues to use the medication daily as prescribed.  Most recent ultrasound reveals chronic near occlusion of the femoral and popliteal veins and the right leg.  He does report intermittent right leg pain, that resolves with rest.    2. Controlled type 2 diabetes mellitus without complication, without long-term current use of insulin (CMS-HCC)  Patient's most recent fasting labs reviewed.  His A1c has improved dramatically from 12 to 7.3.  He has been taking his medications as prescribed as well as modifying his diet.  He monitors his fasting blood sugars near daily.  He states that he has nearly eliminated all sodas and all other beverages aside from black coffee and water.  He denies any episodes of hypo-or hyperglycemia.    Lab Results   Component Value Date/Time    GLYCOHEMOGLOBIN 7.3* 2017 09:03 AM      Lab Results   Component Value Date/Time    SODIUM 138 2017 09:03 AM    POTASSIUM 3.9 2017 09:03 AM    CHLORIDE 105 2017 09:03 AM    CO2 27 2017 09:03 AM    GLUCOSE 198* 2017 09:03 AM    BUN 19 2017 09:03 AM    CREATININE 0.70 2017 09:03 AM      3. Dyslipidemia  Cholesterol is also very well controlled with his current dose of simvastatin.  He denies any side effects with the medication.    Lab Results   Component Value Date/Time    CHOLESTEROL, 2017 09:03 AM    LDL 50 2017 09:03 AM    HDL 47 2017 09:03 AM    TRIGLYCERIDES 114 2017 09:03 AM       Lab Results   Component Value Date/Time    SODIUM 138 2017 09:03 AM    POTASSIUM 3.9  03/23/2017 09:03 AM    CHLORIDE 105 03/23/2017 09:03 AM    CO2 27 03/23/2017 09:03 AM    GLUCOSE 198* 03/23/2017 09:03 AM    BUN 19 03/23/2017 09:03 AM    CREATININE 0.70 03/23/2017 09:03 AM     Lab Results   Component Value Date/Time    ALKALINE PHOSPHATASE 41 03/23/2017 09:03 AM    AST(SGOT) 12 03/23/2017 09:03 AM    ALT(SGPT) 18 03/23/2017 09:03 AM    TOTAL BILIRUBIN 0.9 03/23/2017 09:03 AM        4. Mild intermittent asthma without complication  Patient is requesting a refill of albuterol.  He reports increased wheezing and shortness of breath, particularly when he is outside feeding his horses.  He states that the alfalfa exposure often causes symptoms.   Denies any ER or urgent care visits related to asthma exacerbations.    Allergies:Nkda    Current Outpatient Prescriptions Or[facility] in [facility]   Medication Sig Dispense Refill   • albuterol 108 (90 BASE) MCG/ACT Aero Soln inhalation aerosol Inhale 2 Puffs by mouth every 6 hours as needed for Shortness of Breath. 8.5 g 3   • levothyroxine (SYNTHROID) 100 MCG Tab Take 1 Tab by mouth Every morning on an empty stomach. 90 Tab 1   • lisinopril (PRINIVIL) 2.5 MG Tab TAKE ONE TABLET BY MOUTH ONCE DAILY 90 Tab 1   • rivaroxaban (XARELTO) 20 MG Tab tablet Take 1 Tab by mouth with dinner. 30 Tab 5   • metformin (GLUCOPHAGE) 1000 MG tablet Take 1 Tab by mouth 2 times a day, with meals. 180 Tab 1   • glimepiride (AMARYL) 2 MG Tab Take 1 Tab by mouth every morning. 90 Tab 1   • simvastatin (ZOCOR) 20 MG Tab Take 1 Tab by mouth every evening. 90 Tab 1   • Lancets Misc Lancets order: Lancets for One Touch Ultra 2 meter. Sig: use BID and prn ssx high or low sugar. 100 Each 3   • Blood Glucose Monitoring Suppl SUPPLIES Misc Test strips order: Test strips for One Touch Ultra 2 meter. Sig: use BID and prn ssx high or low sugar. 100 Each 3   • Cholecalciferol (VITAMIN D-3 PO) Take  by mouth every day. Unsure of dose      • gabapentin (NEURONTIN) 300 MG CAPS Take 300 mg by mouth 3  "times a day. 1 in the AM, 1 afternoon, and 2 at HS        No current Epic-ordered facility-administered medications on file.       Past Medical History   Diagnosis Date   • Diabetes      oral agents   • Unspecified disorder of thyroid    • Pain 12-14-12     left arm, hand, 4/10   • High cholesterol    • Former smoker 12/7/2016   • Non compliance with medical treatment 12/7/2016       Social History   Substance Use Topics   • Smoking status: Former Smoker     Quit date: 01/01/2007   • Smokeless tobacco: Never Used   • Alcohol Use: No       No family status information on file.   No family history on file.    ROS: see above    Review of Systems   Constitutional: Negative for fever, chills, weight loss and malaise/fatigue.   HENT: Negative for ear pain, nosebleeds, congestion, sore throat and neck pain.    Eyes: Negative for blurred vision.   Respiratory: Negative for cough, sputum production, shortness of breath and wheezing.    Cardiovascular: Negative for chest pain, palpitations, orthopnea and leg swelling.   Gastrointestinal: Negative for heartburn, nausea, vomiting and abdominal pain.   Genitourinary: Negative for dysuria, urgency and frequency.   Musculoskeletal: Negative for myalgias, back pain and joint pain.   Skin: Negative for rash and itching.   Neurological: Negative for dizziness, tingling, tremors, sensory change, focal weakness and headaches.   Endo/Heme/Allergies: Does not bruise/bleed easily.   Psychiatric/Behavioral: Negative for depression, suicidal ideas and memory loss.  The patient is not nervous/anxious and does not have insomnia.        Exam:  Blood pressure 136/80, pulse 71, temperature 36.4 °C (97.5 °F), resp. rate 18, height 1.651 m (5' 5\"), weight 91.627 kg (202 lb), SpO2 98 %.  General:  Well nourished, well developed male in NAD  Head is grossly normal.  Neck: Thyroid is not enlarged.  Pulmonary: Normal effort. No rales, ronchi, or wheezing.  Cardiovascular: Regular rate and rhythm " without murmur.   Extremities: no clubbing, cyanosis, or edema.  Psych:  Mood and affect are normal.  Answering questions appropriately with good eye contact.      Please note that this dictation was created using voice recognition software. I have made every reasonable attempt to correct obvious errors, but I expect that there are errors of grammar and possibly content that I did not discover before finalizing the note.    Assessment/Plan:    1. Chronic deep vein thrombosis (DVT) of femoral vein of right lower extremity (CMS-[facility])     2. Controlled type 2 diabetes mellitus without complication, without long-term current use of insulin (CMS-[facility])  LIPID PROFILE    HEMOGLOBIN A1C    COMP METABOLIC PANEL    MICROALBUMIN CREAT RATIO URINE   3. Dyslipidemia  LIPID PROFILE   4. Mild intermittent asthma without complication  albuterol 108 (90 BASE) MCG/ACT Aero Soln inhalation aerosol        1.  Discussed ultrasound findings with pharmacist, who recommended continued use of Xarelto  2.  Plan on repeating fasting labs in 3 months  3.  RTC after labs, sooner if needed.

## 2017-04-24 NOTE — MR AVS SNAPSHOT
"        Ravindra Mendez Palma   2017 9:40 AM   Office Visit   MRN: [id number]    Department:  [facility]   Dept Phone:  [phone]    Description:  Male : 1950   Provider:  GINNY Roth           Reason for Visit     Follow-Up US      Allergies as of 2017     Allergen Noted Reactions    Nkda [No Known Drug Allergy] 2012         You were diagnosed with     Chronic deep vein thrombosis (DVT) of femoral vein of right lower extremity (CMS-[facility])   [1487348]  -  Primary     Controlled type 2 diabetes mellitus without complication, without long-term current use of insulin (CMS-[facility])   [9199571]       Dyslipidemia   [045055]       Mild intermittent asthma without complication   [079523]         Vital Signs     Blood Pressure Pulse Temperature Respirations Height Weight    136/80 mmHg 71 36.4 °C (97.5 °F) 18 1.651 m (5' 5\") 91.627 kg (202 lb)    Body Mass Index Oxygen Saturation Smoking Status             33.61 kg/m2 98% Former Smoker         Basic Information     Date Of Birth Sex Race Ethnicity Preferred Language    1950 Male  or   Origin ([place]er,Citizen of the [place]ian, etc) English      Problem List              ICD-10-CM Priority Class Noted - Resolved    Cervical stenosis of spine M48.02   2012 - Present    Spinal stenosis in cervical region M48.02   2012 - Present    Type II or unspecified type diabetes mellitus without mention of complication, uncontrolled E11.65   2013 - Present    Hypothyroidism E03.9   2013 - Present    Dyslipidemia E78.5   2013 - Present    Former smoker Z87.891   2016 - Present    Deep vein thrombosis (HCC) [I82.409] I82.409   2016 - Present      Health Maintenance        Date Due Completion Dates    DIABETES MONOFILAMENT / LE EXAM 1950 ---    RETINAL SCREENING 3/4/1966 ---    IMM DTaP/Tdap/Td Vaccine (1 - Tdap) 3/4/1967 ---    COLONOSCOPY 3/4/1998 ---    IMM ZOSTER VACCINE " 3/4/2008 ---    IMM PNEUMOCOCCAL 65+ (ADULT) LOW/MEDIUM RISK SERIES (1 of 2 - PCV13) 3/4/2013 ---    A1C SCREENING 9/23/2017 3/23/2017, 12/12/2016, 1/29/2013, 8/17/2012    FASTING LIPID PROFILE 3/23/2018 3/23/2017, 12/12/2016, 1/29/2013, 11/12/2012, 8/17/2012, 4/27/2012    URINE ACR / MICROALBUMIN 3/23/2018 3/23/2017, 1/29/2013, 8/17/2012, 4/27/2012    SERUM CREATININE 3/23/2018 3/23/2017, 12/12/2016, 1/29/2013, 1/12/2013, 12/14/2012, 11/12/2012, 8/17/2012, 4/27/2012            Current Immunizations     Influenza TIV (IM) 10/1/2012      Below and/or attached are the medications your provider expects you to take. Review all of your home medications and newly ordered medications with your provider and/or pharmacist. Follow medication instructions as directed by your provider and/or pharmacist. Please keep your medication list with you and share with your provider. Update the information when medications are discontinued, doses are changed, or new medications (including over-the-counter products) are added; and carry medication information at all times in the event of emergency situations     Allergies:  NKDA - (reactions not documented)               Medications  Valid as of: April 24, 2017 - 12:20 PM    Generic Name Brand Name Tablet Size Instructions for use    Albuterol Sulfate (Aero Soln) albuterol 108 (90 BASE) MCG/ACT Inhale 2 Puffs by mouth every 6 hours as needed for Shortness of Breath.        Blood Glucose Monitoring Suppl (Misc) Blood Glucose Monitoring Suppl SUPPLIES Test strips order: Test strips for One Touch Ultra 2 meter. Sig: use BID and prn ssx high or low sugar.        Cholecalciferol   Take  by mouth every day. Unsure of dose         Gabapentin (Cap) NEURONTIN 300 MG Take 300 mg by mouth 3 times a day. 1 in the AM, 1 afternoon, and 2 at HS         Glimepiride (Tab) AMARYL 2 MG Take 1 Tab by mouth every morning.        Lancets (Misc) Lancets  Lancets order: Lancets for One Touch Ultra 2 meter. Sig:  use BID and prn ssx high or low sugar.        Levothyroxine Sodium (Tab) SYNTHROID 100 MCG Take 1 Tab by mouth Every morning on an empty stomach.        Lisinopril (Tab) PRINIVIL 2.5 MG TAKE ONE TABLET BY MOUTH ONCE DAILY        MetFORMIN HCl (Tab) GLUCOPHAGE 1000 MG Take 1 Tab by mouth 2 times a day, with meals.        Rivaroxaban (Tab) XARELTO 20 MG Take 1 Tab by mouth with dinner.        Simvastatin (Tab) ZOCOR 20 MG Take 1 Tab by mouth every evening.        .                 Medicines prescribed today were sent to:     [facility] [address] [place] [address] - [address] [id number] [facility] [address]    Phone: [phone] Fax: [phone]    Open 24 Hours?: No      Medication refill instructions:       If your prescription bottle indicates you have medication refills left, it is not necessary to call your provider’s office. Please contact your pharmacy and they will refill your medication.    If your prescription bottle indicates you do not have any refills left, you may request refills at any time through one of the following ways: The online [employer] system (except Urgent Care), by calling your provider’s office, or by asking your pharmacy to contact your provider’s office with a refill request. Medication refills are processed only during regular business hours and may not be available until the next business day. Your provider may request additional information or to have a follow-up visit with you prior to refilling your medication.   *Please Note: Medication refills are assigned a new Rx number when refilled electronically. Your pharmacy may indicate that no refills were authorized even though a new prescription for the same medication is available at the pharmacy. Please request the medicine by name with the pharmacy before contacting your provider for a refill.        Your To Do List     Future Labs/Procedures Complete By Expires    COMP METABOLIC PANEL  As directed 4/24/2018     HEMOGLOBIN A1C  As directed 4/24/2018    LIPID PROFILE  As directed 4/24/2018    MICROALBUMIN CREAT RATIO URINE  As directed 4/24/2018         FINDING ROVER Access Code: [email or web address]  Expires: 5/24/2017 12:20 PM    FINDING ROVER  A secure, online tool to manage your health information     [employer] FINDING ROVER® is a secure, online tool that connects you to your personalized health information from the privacy of your home -- day or night - making it very easy for you to manage your healthcare. Once the activation process is completed, you can even access your medical information using the FINDING ROVER alize, which is available for free in the Apple Alize store or Google Play store.     FINDING ROVER provides the following levels of access (as shown below):   My Chart Features   [facility] Doctor [facility]  Urgent  Care Non-[facility]  Primary Care  Doctor   Email your healthcare team securely and privately 24/7 X X X    Manage appointments: schedule your next appointment; view details of past/upcoming appointments X      Request prescription refills. X      View recent personal medical records, including lab and immunizations X X X X   View health record, including health history, allergies, medications X X X X   Read reports about your outpatient visits, procedures, consult and ER notes X X X X   See your discharge summary, which is a recap of your hospital and/or ER visit that includes your diagnosis, lab results, and care plan. X X       How to register for FINDING ROVER:  1. Go to  [email or web address].  2. Click on the Sign Up Now box, which takes you to the New Member Sign Up page. You will need to provide the following information:  a. Enter your FINDING ROVER Access Code exactly as it appears at the top of this page. (You will not need to use this code after you’ve completed the sign-up process. If you do not sign up before the expiration date, you must request a new code.)   b. Enter your date of birth.    c. Enter your home email address.   d. Click Submit, and follow the next screen’s instructions.  3. Create a Apparityt ID. This will be your myMatrixx login ID and cannot be changed, so think of one that is secure and easy to remember.  4. Create a App[employer] password. You can change your password at any time.  5. Enter your Password Reset Question and Answer. This can be used at a later time if you forget your password.   6. Enter your e-mail address. This allows you to receive e-mail notifications when new information is available in myMatrixx.  7. Click Sign Up. You can now view your health information.    For assistance activating your myMatrixx account, call [phone]

## 2017-06-14 NOTE — TELEPHONE ENCOUNTER
Was the patient seen in the last year in this department? Yes     Does patient have an active prescription for medications requested? No     Received Request Via: Pharmacy      Pt met protocol?: Yes    A1C 3/17

## 2017-06-15 RX ORDER — SIMVASTATIN 20 MG
TABLET ORAL
Qty: 90 TAB | Refills: 1 | Status: SHIPPED | OUTPATIENT
Start: 2017-06-15 | End: 2017-12-20 | Stop reason: SDUPTHER

## 2017-06-15 RX ORDER — GLIMEPIRIDE 2 MG/1
TABLET ORAL
Qty: 90 TAB | Refills: 1 | Status: SHIPPED | OUTPATIENT
Start: 2017-06-15 | End: 2017-12-20 | Stop reason: SDUPTHER

## 2017-06-15 NOTE — TELEPHONE ENCOUNTER
Patient has recently been seen by PCP within the last 6 months per protocol (4/17). Will refill medications for 6 months.  Lab Results   Component Value Date/Time    GLYCOHEMOGLOBIN 7.3* 03/23/2017 09:03 AM      Lab Results   Component Value Date/Time    MICRO ALB CREAT RATIO 10 03/23/2017 09:03 AM    MICROALBUMIN, URINE RANDOM 1.3 03/23/2017 09:03 AM      Lab Results   Component Value Date/Time    ALKALINE PHOSPHATASE 41 03/23/2017 09:03 AM    AST(SGOT) 12 03/23/2017 09:03 AM    ALT(SGPT) 18 03/23/2017 09:03 AM    TOTAL BILIRUBIN 0.9 03/23/2017 09:03 AM     Lab Results   Component Value Date/Time    CHOLESTEROL, 03/23/2017 09:03 AM    LDL 50 03/23/2017 09:03 AM    HDL 47 03/23/2017 09:03 AM    TRIGLYCERIDES 114 03/23/2017 09:03 AM

## 2017-08-15 ENCOUNTER — HOSPITAL ENCOUNTER (OUTPATIENT)
Dept: LAB | Facility: MEDICAL CENTER | Age: 67
End: 2017-08-15
Attending: NURSE PRACTITIONER
Payer: MEDICARE

## 2017-08-15 DIAGNOSIS — E78.5 DYSLIPIDEMIA: ICD-10-CM

## 2017-08-15 DIAGNOSIS — E11.8 CONTROLLED TYPE 2 DIABETES MELLITUS WITH COMPLICATION, WITHOUT LONG-TERM CURRENT USE OF INSULIN (HCC): ICD-10-CM

## 2017-08-15 DIAGNOSIS — E11.9 CONTROLLED TYPE 2 DIABETES MELLITUS WITHOUT COMPLICATION, WITHOUT LONG-TERM CURRENT USE OF INSULIN (HCC): ICD-10-CM

## 2017-08-15 LAB
ALBUMIN SERPL BCP-MCNC: 4.2 G/DL (ref 3.2–4.9)
ALBUMIN/GLOB SERPL: 1.8 G/DL
ALP SERPL-CCNC: 40 U/L (ref 30–99)
ALT SERPL-CCNC: 15 U/L (ref 2–50)
ANION GAP SERPL CALC-SCNC: 8 MMOL/L (ref 0–11.9)
AST SERPL-CCNC: 12 U/L (ref 12–45)
BILIRUB SERPL-MCNC: 0.8 MG/DL (ref 0.1–1.5)
BUN SERPL-MCNC: 20 MG/DL (ref 8–22)
CALCIUM SERPL-MCNC: 9.2 MG/DL (ref 8.5–10.5)
CHLORIDE SERPL-SCNC: 103 MMOL/L (ref 96–112)
CHOLEST SERPL-MCNC: 127 MG/DL (ref 100–199)
CO2 SERPL-SCNC: 26 MMOL/L (ref 20–33)
CREAT SERPL-MCNC: 0.73 MG/DL (ref 0.5–1.4)
CREAT UR-MCNC: 131.7 MG/DL
EST. AVERAGE GLUCOSE BLD GHB EST-MCNC: 163 MG/DL
GFR SERPL CREATININE-BSD FRML MDRD: >60 ML/MIN/1.73 M 2
GLOBULIN SER CALC-MCNC: 2.3 G/DL (ref 1.9–3.5)
GLUCOSE SERPL-MCNC: 192 MG/DL (ref 65–99)
HBA1C MFR BLD: 7.3 % (ref 0–5.6)
HDLC SERPL-MCNC: 45 MG/DL
LDLC SERPL CALC-MCNC: 54 MG/DL
MICROALBUMIN UR-MCNC: 1.4 MG/DL
MICROALBUMIN/CREAT UR: 11 MG/G (ref 0–30)
POTASSIUM SERPL-SCNC: 4.1 MMOL/L (ref 3.6–5.5)
PROT SERPL-MCNC: 6.5 G/DL (ref 6–8.2)
SODIUM SERPL-SCNC: 137 MMOL/L (ref 135–145)
TRIGL SERPL-MCNC: 140 MG/DL (ref 0–149)

## 2017-08-15 PROCEDURE — 83036 HEMOGLOBIN GLYCOSYLATED A1C: CPT | Mod: GA

## 2017-08-15 PROCEDURE — 80053 COMPREHEN METABOLIC PANEL: CPT

## 2017-08-15 PROCEDURE — 82043 UR ALBUMIN QUANTITATIVE: CPT

## 2017-08-15 PROCEDURE — 82570 ASSAY OF URINE CREATININE: CPT

## 2017-08-15 PROCEDURE — 80061 LIPID PANEL: CPT

## 2017-08-15 PROCEDURE — 36415 COLL VENOUS BLD VENIPUNCTURE: CPT

## 2017-08-16 ENCOUNTER — TELEPHONE (OUTPATIENT)
Dept: MEDICAL GROUP | Facility: PHYSICIAN GROUP | Age: 67
End: 2017-08-16

## 2017-08-16 ENCOUNTER — OFFICE VISIT (OUTPATIENT)
Dept: MEDICAL GROUP | Facility: PHYSICIAN GROUP | Age: 67
End: 2017-08-16
Payer: MEDICARE

## 2017-08-16 ENCOUNTER — HOSPITAL ENCOUNTER (OUTPATIENT)
Dept: RADIOLOGY | Facility: MEDICAL CENTER | Age: 67
End: 2017-08-16
Attending: NURSE PRACTITIONER
Payer: MEDICARE

## 2017-08-16 ENCOUNTER — TELEPHONE (OUTPATIENT)
Dept: VASCULAR LAB | Facility: MEDICAL CENTER | Age: 67
End: 2017-08-16

## 2017-08-16 VITALS
RESPIRATION RATE: 20 BRPM | WEIGHT: 203 LBS | SYSTOLIC BLOOD PRESSURE: 134 MMHG | HEIGHT: 65 IN | BODY MASS INDEX: 33.82 KG/M2 | DIASTOLIC BLOOD PRESSURE: 70 MMHG | OXYGEN SATURATION: 98 % | TEMPERATURE: 98 F | HEART RATE: 77 BPM

## 2017-08-16 DIAGNOSIS — M54.12 CERVICAL RADICULOPATHY, CHRONIC: Primary | ICD-10-CM

## 2017-08-16 DIAGNOSIS — Z98.1 S/P CERVICAL SPINAL FUSION: ICD-10-CM

## 2017-08-16 DIAGNOSIS — I82.511 CHRONIC DEEP VEIN THROMBOSIS (DVT) OF FEMORAL VEIN OF RIGHT LOWER EXTREMITY (HCC): ICD-10-CM

## 2017-08-16 DIAGNOSIS — M54.12 CERVICAL RADICULOPATHY, CHRONIC: ICD-10-CM

## 2017-08-16 PROCEDURE — 99214 OFFICE O/P EST MOD 30 MIN: CPT | Performed by: NURSE PRACTITIONER

## 2017-08-16 PROCEDURE — 72050 X-RAY EXAM NECK SPINE 4/5VWS: CPT

## 2017-08-16 ASSESSMENT — ENCOUNTER SYMPTOMS
DIZZINESS: 1
EXTREMITY NUMBNESS: 1

## 2017-08-16 NOTE — TELEPHONE ENCOUNTER
Received referral that pt is to continue on Xarelto.   Called and spoke with pt and wife. They want to discuss further with Dr. Lopez. I advised that if he's to remain on the Xarelto we would want to do f/u appt in September to check in. Gave her the clinic number to call back to make appt.     Thi Arana, JUANISD

## 2017-08-16 NOTE — TELEPHONE ENCOUNTER
1. Caller Name: Jessy(Wife)                      Call Back Number: [phone] (home)     2. Message: Wife called asking is theres another option to the medication Xarelto? The co-pay has gone from $30 a month to $80.  Please Advise     3. Patient approves office to leave a detailed voicemail/MyChart message: yes

## 2017-08-16 NOTE — TELEPHONE ENCOUNTER
----- Message from GINNY Roth sent at 8/16/2017 12:59 PM PDT -----  Xray shows good alignment of the hardware.  Please advise patient to proceed with MRI scan.  Thank  you

## 2017-08-16 NOTE — MR AVS SNAPSHOT
"        Ravindra Mendez Louie   2017 11:40 AM   Office Visit   MRN: [id number]    Department:  [facility]   Dept Phone:  [phone]    Description:  Male : 1950   Provider:  GINNY Roth           Reason for Visit     Shoulder Pain     Numbness hands    Dizziness           Allergies as of 2017     Allergen Noted Reactions    Nkda [No Known Drug Allergy] 2012         You were diagnosed with     Cervical radiculopathy, chronic   [073212]  -  Primary     Chronic deep vein thrombosis (DVT) of femoral vein of right lower extremity (CMS-HCC)   [9940230]       S/P cervical spinal fusion   [681694]         Vital Signs     Blood Pressure Pulse Temperature Respirations Height Weight    134/70 mmHg 77 36.7 °C (98 °F) 20 1.651 m (5' 5\") 92.08 kg (203 lb)    Body Mass Index Oxygen Saturation Smoking Status             33.78 kg/m2 98% Former Smoker         Basic Information     Date Of Birth Sex Race Ethnicity Preferred Language    1950 Male  or   Origin (Yoruba,Guinean,Northern Irish,Maltese, etc) English      Problem List              ICD-10-CM Priority Class Noted - Resolved    Cervical stenosis of spine M48.02   2012 - Present    Spinal stenosis in cervical region M48.02   2012 - Present    Type II or unspecified type diabetes mellitus without mention of complication, uncontrolled E11.65   2013 - Present    Hypothyroidism E03.9   2013 - Present    Dyslipidemia E78.5   2013 - Present    Former smoker Z87.891   2016 - Present    Deep vein thrombosis (HCC) [I82.409] I82.409   2016 - Present      Health Maintenance        Date Due Completion Dates    DIABETES MONOFILAMENT / LE EXAM 1950 ---    RETINAL SCREENING 3/4/1966 ---    IMM DTaP/Tdap/Td Vaccine (1 - Tdap) 3/4/1967 ---    COLONOSCOPY 3/4/1998 ---    IMM ZOSTER VACCINE 3/4/2008 ---    IMM PNEUMOCOCCAL 65+ (ADULT) LOW/MEDIUM RISK SERIES (1 of 2 - PCV13) 3/4/2013 --- "    IMM INFLUENZA (1) 9/1/2017 10/1/2012    A1C SCREENING 2/15/2018 8/15/2017, 3/23/2017, 12/12/2016, 1/29/2013, 8/17/2012    FASTING LIPID PROFILE 8/15/2018 8/15/2017, 3/23/2017, 12/12/2016, 1/29/2013, 11/12/2012, 8/17/2012, 4/27/2012    URINE ACR / MICROALBUMIN 8/15/2018 8/15/2017, 3/23/2017, 1/29/2013, 8/17/2012, 4/27/2012    SERUM CREATININE 8/15/2018 8/15/2017, 3/23/2017, 12/12/2016, 1/29/2013, 1/12/2013, 12/14/2012, 11/12/2012, 8/17/2012, 4/27/2012            Current Immunizations     Influenza TIV (IM) 10/1/2012      Below and/or attached are the medications your provider expects you to take. Review all of your home medications and newly ordered medications with your provider and/or pharmacist. Follow medication instructions as directed by your provider and/or pharmacist. Please keep your medication list with you and share with your provider. Update the information when medications are discontinued, doses are changed, or new medications (including over-the-counter products) are added; and carry medication information at all times in the event of emergency situations     Allergies:  NKDA - (reactions not documented)               Medications  Valid as of: August 16, 2017 - 11:55 AM    Generic Name Brand Name Tablet Size Instructions for use    Albuterol Sulfate (Aero Soln) albuterol 108 (90 BASE) MCG/ACT Inhale 2 Puffs by mouth every 6 hours as needed for Shortness of Breath.        Blood Glucose Monitoring Suppl (Misc) Blood Glucose Monitoring Suppl SUPPLIES Test strips order: Test strips for One Touch Ultra 2 meter. Sig: use BID and prn ssx high or low sugar.        Cholecalciferol   Take  by mouth every day. Unsure of dose         Gabapentin (Cap) NEURONTIN 300 MG Take 300 mg by mouth 3 times a day. 1 in the AM, 1 afternoon, and 2 at HS         Glimepiride (Tab) AMARYL 2 MG TAKE ONE TABLET BY MOUTH ONCE DAILY IN THE MORNING        Lancets (Misc) Lancets  Lancets order: Lancets for One Touch Ultra 2 meter.  Sig: use BID and prn ssx high or low sugar.        Levothyroxine Sodium (Tab) SYNTHROID 100 MCG Take 1 Tab by mouth Every morning on an empty stomach.        Lisinopril (Tab) PRINIVIL 2.5 MG TAKE ONE TABLET BY MOUTH ONCE DAILY        MetFORMIN HCl (Tab) GLUCOPHAGE 1000 MG TAKE ONE TABLET BY MOUTH TWICE DAILY WITH MEALS        Rivaroxaban (Tab) XARELTO 20 MG Take 1 Tab by mouth with dinner.        Simvastatin (Tab) ZOCOR 20 MG TAKE ONE TABLET BY MOUTH ONCE DAILY IN THE EVENING        .                 Medicines prescribed today were sent to:     [facility] [address] [place] [address] - [address] [facility] [address]    Phone: [phone] Fax: [phone]    Open 24 Hours?: No      Medication refill instructions:       If your prescription bottle indicates you have medication refills left, it is not necessary to call your provider’s office. Please contact your pharmacy and they will refill your medication.    If your prescription bottle indicates you do not have any refills left, you may request refills at any time through one of the following ways: The online [employer] system (except Urgent Care), by calling your provider’s office, or by asking your pharmacy to contact your provider’s office with a refill request. Medication refills are processed only during regular business hours and may not be available until the next business day. Your provider may request additional information or to have a follow-up visit with you prior to refilling your medication.   *Please Note: Medication refills are assigned a new Rx number when refilled electronically. Your pharmacy may indicate that no refills were authorized even though a new prescription for the same medication is available at the pharmacy. Please request the medicine by name with the pharmacy before contacting your provider for a refill.        Your To Do List     Future Labs/Procedures Complete By Expires    DX-CERVICAL SPINE-4+ VIEWS   As directed 8/16/2018    MR-CERVICAL SPINE-W/O  As directed 8/16/2018      Referral     A referral request has been sent to our patient care coordination department. Please allow 3-5 business days for us to process this request and contact you either by phone or mail. If you do not hear from us by the 5th business day, please call us at [phone].           [employer] Access Code: [email or web address]  Expires: 9/15/2017 11:52 AM    [employer]  A secure, online tool to manage your health information     [employer]’s [employer]® is a secure, online tool that connects you to your personalized health information from the privacy of your home -- day or night - making it very easy for you to manage your healthcare. Once the activation process is completed, you can even access your medical information using the [employer] alize, which is available for free in the Apple Alize store or Google Play store.     [employer] provides the following levels of access (as shown below):   My Chart Features   [facility] Doctor [facility]  Specialists [facility]  Urgent  Care Non-[facility]  Primary Care  Doctor   Email your healthcare team securely and privately 24/7 X X X    Manage appointments: schedule your next appointment; view details of past/upcoming appointments X      Request prescription refills. X      View recent personal medical records, including lab and immunizations X X X X   View health record, including health history, allergies, medications X X X X   Read reports about your outpatient visits, procedures, consult and ER notes X X X X   See your discharge summary, which is a recap of your hospital and/or ER visit that includes your diagnosis, lab results, and care plan. X X       How to register for [employer]:  1. Go to  [email or web address].  2. Click on the Sign Up Now box, which takes you to the New Member Sign Up page. You will need to provide the following information:  a. Enter your [employer] Access Code exactly as it appears  at the top of this page. (You will not need to use this code after you’ve completed the sign-up process. If you do not sign up before the expiration date, you must request a new code.)   b. Enter your date of birth.   c. Enter your home email address.   d. Click Submit, and follow the next screen’s instructions.  3. Create a [employer] ID. This will be your [employer] login ID and cannot be changed, so think of one that is secure and easy to remember.  4. Create a [employer] password. You can change your password at any time.  5. Enter your Password Reset Question and Answer. This can be used at a later time if you forget your password.   6. Enter your e-mail address. This allows you to receive e-mail notifications when new information is available in [employer].  7. Click Sign Up. You can now view your health information.    For assistance activating your [employer] account, call [phone]

## 2017-08-16 NOTE — PROGRESS NOTES
"Subjective:      Ravindra Palma is a 67 y.o. male who presents with Shoulder Pain; Numbness; and Dizziness            Shoulder Pain    Numbness    Jacinto Waite is here today to discuss the following problem:    1. Cervical radiculopathy, chronic  3. S/P cervical spinal fusion  Patient underwent an anterior cervical fusion with Dr. Post on 1/11/13.  C3-4,4-5,5-6 with artificial disc replacements on 4-5,5-6.  He has been having more burning, tingling and pain in his upper extremities.  No lower extremity symptoms.  Denies any recent injury to neck or extremities.  Denies any extremity weakness or loss of bowel or bladder control.  He is not taking any OTC pain medications.    2. Chronic deep vein thrombosis (DVT) of femoral vein of right lower extremity (CMS-HCC)  Needs refill of Xarelto, takes daily.  Denies any spontaneous bleeding, does report easy bruising.    Active Ambulatory Problems     Diagnosis Date Noted   • Cervical stenosis of spine 12/17/2012   • Spinal stenosis in cervical region 12/26/2012   • Type II or unspecified type diabetes mellitus without mention of complication, uncontrolled 01/12/2013   • Hypothyroidism 01/12/2013   • Dyslipidemia 01/12/2013   • Former smoker 12/07/2016   • Deep vein thrombosis (HCC) [I82.409] 12/09/2016     Resolved Ambulatory Problems     Diagnosis Date Noted   • Non compliance with medical treatment 12/07/2016     Past Medical History   Diagnosis Date   • Diabetes    • Unspecified disorder of thyroid    • Pain 12-14-12   • High cholesterol      Review of Systems   Neurological: Positive for dizziness.        See HPI          Objective:     /70 mmHg  Pulse 77  Temp(Src) 36.7 °C (98 °F)  Resp 20  Ht 1.651 m (5' 5\")  Wt 92.08 kg (203 lb)  BMI 33.78 kg/m2  SpO2 98%     Physical Exam   Constitutional: He is oriented to person, place, and time. He appears well-developed and well-nourished.   Cardiovascular: Normal rate.    Pulmonary/Chest: Effort " normal.   Musculoskeletal:        Cervical back: He exhibits decreased range of motion and spasm. He exhibits no tenderness and no pain.   Neurological: He is alert and oriented to person, place, and time. He is not disoriented. No sensory deficit. He exhibits normal muscle tone.   Reflex Scores:       Bicep reflexes are 2+ on the right side and 2+ on the left side.       Patellar reflexes are 4+ on the right side and 4+ on the left side.  Skin: Skin is warm and dry.   Nursing note and vitals reviewed.              Assessment/Plan:     1. Cervical radiculopathy, chronic  DX-CERVICAL SPINE-4+ VIEWS    MR-CERVICAL SPINE-W/O   2. Chronic deep vein thrombosis (DVT) of femoral vein of right lower extremity (CMS-HCC)  rivaroxaban (XARELTO) 20 MG Tab tablet    REFERRAL TO ANTICOAGULATION MONITORING   3. S/P cervical spinal fusion  DX-CERVICAL SPINE-4+ VIEWS    MR-CERVICAL SPINE-W/O       1.  Repeat neck imaging  2.  Xray today, MRI order given  3.  Consider referral to surgeon given imaging results.

## 2017-08-22 ENCOUNTER — HOSPITAL ENCOUNTER (OUTPATIENT)
Dept: RADIOLOGY | Facility: MEDICAL CENTER | Age: 67
End: 2017-08-22
Attending: NURSE PRACTITIONER
Payer: MEDICARE

## 2017-08-22 DIAGNOSIS — M54.12 CERVICAL RADICULOPATHY, CHRONIC: ICD-10-CM

## 2017-08-22 DIAGNOSIS — Z98.1 S/P CERVICAL SPINAL FUSION: ICD-10-CM

## 2017-08-22 PROCEDURE — 72141 MRI NECK SPINE W/O DYE: CPT

## 2017-08-23 ENCOUNTER — TELEPHONE (OUTPATIENT)
Dept: MEDICAL GROUP | Facility: PHYSICIAN GROUP | Age: 67
End: 2017-08-23

## 2017-08-23 DIAGNOSIS — Z98.1 HISTORY OF FUSION OF CERVICAL SPINE: ICD-10-CM

## 2017-08-23 DIAGNOSIS — M47.22 CERVICAL RADICULOPATHY DUE TO DEGENERATIVE JOINT DISEASE OF SPINE: ICD-10-CM

## 2017-08-23 NOTE — TELEPHONE ENCOUNTER
----- Message from GINNY oRth sent at 8/23/2017  6:43 AM PDT -----  MRI reveals worsening arthritis of the neck.  I think he needs to go back and see Dr. Post.  I have placed a referral.  Thank you

## 2017-08-25 ENCOUNTER — TELEPHONE (OUTPATIENT)
Dept: VASCULAR LAB | Facility: MEDICAL CENTER | Age: 67
End: 2017-08-25

## 2017-08-25 NOTE — TELEPHONE ENCOUNTER
LM for patient to discuss options for anticoagulation.  Copay on Xarelto has increased and he would like to explore other options per PCP.  Isai Berry, JUANISD

## 2017-09-11 NOTE — TELEPHONE ENCOUNTER
Was the patient seen in the last year in this department? Yes 08/16/17    Does patient have an active prescription for medications requested? No     Received Request Via: Patient

## 2017-09-12 RX ORDER — LEVOTHYROXINE SODIUM 0.1 MG/1
100 TABLET ORAL
Qty: 90 TAB | Refills: 1 | Status: SHIPPED | OUTPATIENT
Start: 2017-09-12 | End: 2018-04-20 | Stop reason: SDUPTHER

## 2017-09-12 RX ORDER — LISINOPRIL 2.5 MG/1
TABLET ORAL
Qty: 90 TAB | Refills: 1 | Status: SHIPPED | OUTPATIENT
Start: 2017-09-12 | End: 2018-04-20 | Stop reason: SDUPTHER

## 2017-09-12 NOTE — TELEPHONE ENCOUNTER
Last seen by PCP 8/17. Will send 6 months to pharmacy. TSH 3/17 (5.190) dose adjusted at that time.

## 2017-09-12 NOTE — TELEPHONE ENCOUNTER
Was the patient seen in the last year in this department? Yes     Does patient have an active prescription for medications requested? No     Received Request Via: Pharmacy      Pt met protocol?: Yes    LAST OV 08/16/2017    BP Readings from Last 1 Encounters:   08/16/17 134/70     TSH   Date Value Ref Range Status   03/23/2017 5.190 (H) 0.300 - 3.700 uIU/mL Final

## 2017-12-21 NOTE — TELEPHONE ENCOUNTER
Was the patient seen in the last year in this department? Yes     Does patient have an active prescription for medications requested? No     Received Request Via: Patient      Pt met protocol?: Yes, labs and ov 8/17 a1c 7.3 lipid labs 8/17, appt with Kai 3/5/18 to est.

## 2017-12-22 RX ORDER — GLIMEPIRIDE 2 MG/1
TABLET ORAL
Qty: 90 TAB | Refills: 0 | Status: SHIPPED | OUTPATIENT
Start: 2017-12-22 | End: 2018-04-20 | Stop reason: SDUPTHER

## 2017-12-22 RX ORDER — SIMVASTATIN 20 MG
TABLET ORAL
Qty: 90 TAB | Refills: 0 | Status: SHIPPED | OUTPATIENT
Start: 2017-12-22 | End: 2018-05-24 | Stop reason: SDUPTHER

## 2018-01-11 DIAGNOSIS — I82.511 CHRONIC DEEP VEIN THROMBOSIS (DVT) OF FEMORAL VEIN OF RIGHT LOWER EXTREMITY (HCC): ICD-10-CM

## 2018-04-20 DIAGNOSIS — I82.511 CHRONIC DEEP VEIN THROMBOSIS (DVT) OF FEMORAL VEIN OF RIGHT LOWER EXTREMITY (HCC): ICD-10-CM

## 2018-04-20 RX ORDER — GLIMEPIRIDE 2 MG/1
TABLET ORAL
Qty: 90 TAB | Refills: 0 | Status: SHIPPED | OUTPATIENT
Start: 2018-04-20 | End: 2018-07-19 | Stop reason: SDUPTHER

## 2018-04-20 RX ORDER — LEVOTHYROXINE SODIUM 0.1 MG/1
100 TABLET ORAL
Qty: 90 TAB | Refills: 0 | Status: SHIPPED | OUTPATIENT
Start: 2018-04-20 | End: 2018-07-19 | Stop reason: SDUPTHER

## 2018-04-20 RX ORDER — LISINOPRIL 2.5 MG/1
TABLET ORAL
Qty: 90 TAB | Refills: 0 | Status: SHIPPED | OUTPATIENT
Start: 2018-04-20 | End: 2018-07-19 | Stop reason: SDUPTHER

## 2018-04-20 NOTE — TELEPHONE ENCOUNTER
**Pt's appointment was cancelled on 1/18/18 and to establish on 3/5/18 pre proivder being out sick. Made soonest appointment for Dr Gandhi on 5/1/2018. Pt is now out of medications.   Please Advise     Was the patient seen in the last year in this department? Yes     Does patient have an active prescription for medications requested? No     Received Request Via: Patient

## 2018-04-20 NOTE — TELEPHONE ENCOUNTER
Was the patient seen in the last year in this department? Yes     Does patient have an active prescription for medications requested? No     Received Request Via: Pharmacy      Pt met protocol?: Yes-LABS NEED UPDATE    LAST OV 08/16/2017      Lab Results  Component Value Date/Time   TSHULTRASEN 5.190 (H) 03/23/2017 0903       Lab Results  Component Value Date/Time   HBA1C 7.3 (H) 08/15/2017 1012       Lab Results  Component Value Date/Time   AVGLUC 163 08/15/2017 1012       Lab Results  Component Value Date/Time   CHOLSTRLTOT 127 08/15/2017 1012       Lab Results  Component Value Date/Time   TRIGLYCERIDE 140 08/15/2017 1012       Lab Results  Component Value Date/Time   HDL 45 08/15/2017 1012       Lab Results  Component Value Date/Time   LDL 54 08/15/2017 1012

## 2018-05-03 ENCOUNTER — OFFICE VISIT (OUTPATIENT)
Dept: MEDICAL GROUP | Facility: PHYSICIAN GROUP | Age: 68
End: 2018-05-03
Payer: MEDICARE

## 2018-05-03 VITALS
TEMPERATURE: 97.9 F | OXYGEN SATURATION: 95 % | HEIGHT: 65 IN | SYSTOLIC BLOOD PRESSURE: 122 MMHG | DIASTOLIC BLOOD PRESSURE: 90 MMHG | RESPIRATION RATE: 16 BRPM | BODY MASS INDEX: 36.82 KG/M2 | HEART RATE: 78 BPM | WEIGHT: 221 LBS

## 2018-05-03 DIAGNOSIS — Z23 NEED FOR VACCINATION WITH 13-POLYVALENT PNEUMOCOCCAL CONJUGATE VACCINE: ICD-10-CM

## 2018-05-03 DIAGNOSIS — E78.5 DYSLIPIDEMIA: ICD-10-CM

## 2018-05-03 DIAGNOSIS — I82.411 ACUTE DEEP VEIN THROMBOSIS (DVT) OF FEMORAL VEIN OF RIGHT LOWER EXTREMITY (HCC): ICD-10-CM

## 2018-05-03 DIAGNOSIS — E66.9 OBESITY (BMI 30-39.9): ICD-10-CM

## 2018-05-03 DIAGNOSIS — E11.9 TYPE 2 DIABETES MELLITUS WITHOUT COMPLICATION, WITHOUT LONG-TERM CURRENT USE OF INSULIN (HCC): ICD-10-CM

## 2018-05-03 DIAGNOSIS — E03.8 OTHER SPECIFIED HYPOTHYROIDISM: ICD-10-CM

## 2018-05-03 DIAGNOSIS — Z12.11 COLON CANCER SCREENING: ICD-10-CM

## 2018-05-03 DIAGNOSIS — R79.89 LOW VITAMIN D LEVEL: ICD-10-CM

## 2018-05-03 PROCEDURE — 92250 FUNDUS PHOTOGRAPHY W/I&R: CPT | Mod: TC | Performed by: INTERNAL MEDICINE

## 2018-05-03 PROCEDURE — 90670 PCV13 VACCINE IM: CPT | Performed by: INTERNAL MEDICINE

## 2018-05-03 PROCEDURE — 99214 OFFICE O/P EST MOD 30 MIN: CPT | Mod: 25 | Performed by: INTERNAL MEDICINE

## 2018-05-03 PROCEDURE — G0009 ADMIN PNEUMOCOCCAL VACCINE: HCPCS | Performed by: INTERNAL MEDICINE

## 2018-05-03 ASSESSMENT — PATIENT HEALTH QUESTIONNAIRE - PHQ9: CLINICAL INTERPRETATION OF PHQ2 SCORE: 0

## 2018-05-03 NOTE — ASSESSMENT & PLAN NOTE
Taking metformin 1 g bid and glimepiride bid. Was just taking metformin in the afternoons but resumed his morning day just 2 days ago. He doesn't check his blood sugars regularly. His fasting blood sugar yesterday was 312 and he had has 2 raspberry sherbets the night before. He doesn't see an eye doctor regularly.

## 2018-05-03 NOTE — ASSESSMENT & PLAN NOTE
He's had low vitamin D in the past but not taking vitamin D anymore. He does sit out in the sun, though.

## 2018-05-03 NOTE — PROGRESS NOTES
PRIMARY CARE CLINIC NEW PATIENT H&P  Chief Complaint   Patient presents with   • Diabetes     History of Present Illness     Type II or unspecified type diabetes mellitus without mention of complication, uncontrolled  Taking metformin 1 g bid and glimepiride bid. Was just taking metformin in the afternoons but resumed his morning day just 2 days ago. He doesn't check his blood sugars regularly. His fasting blood sugar yesterday was 312 and he had has 2 raspberry sherbets the night before. He doesn't see an eye doctor regularly.     Low vitamin D level  He's had low vitamin D in the past but not taking vitamin D anymore. He does sit out in the sun, though.     Dyslipidemia  He's been on cholesterol medication for 7-8 years.     Deep vein thrombosis (HCC) [I82.409]  He has a history of RLE DVT and has been on xarelto for 2 years. Travel wasn't a provoking factor for the RLE DVT.     Hypothyroidism  Denies history of surgery or radiation, on levothyroxine 100 mcg daily.     Obesity (BMI 30-39.9)  He's been eating a lot; at his peak he weighed 350 lbs.     Current Outpatient Prescriptions   Medication Sig Dispense Refill   • levothyroxine (SYNTHROID) 100 MCG Tab Take 1 Tab by mouth Every morning on an empty stomach. 90 Tab 0   • rivaroxaban (XARELTO) 20 MG Tab tablet Take 1 Tab by mouth with dinner. 90 Tab 0   • lisinopril (PRINIVIL) 2.5 MG Tab TAKE ONE TABLET BY MOUTH ONCE DAILY 90 Tab 0   • glimepiride (AMARYL) 2 MG Tab TAKE ONE TABLET BY MOUTH ONCE DAILY IN THE MORNING 90 Tab 0   • simvastatin (ZOCOR) 20 MG Tab TAKE ONE TABLET BY MOUTH ONCE DAILY IN THE EVENING 90 Tab 0   • metformin (GLUCOPHAGE) 1000 MG tablet TAKE ONE TABLET BY MOUTH TWICE DAILY WITH MEALS 180 Tab 1   • albuterol 108 (90 BASE) MCG/ACT Aero Soln inhalation aerosol Inhale 2 Puffs by mouth every 6 hours as needed for Shortness of Breath. 8.5 g 3   • Lancets Misc Lancets order: Lancets for One Touch Ultra 2 meter. Sig: use BID and prn ssx high or low  sugar. 100 Each 3   • Blood Glucose Monitoring Suppl SUPPLIES Misc Test strips order: Test strips for One Touch Ultra 2 meter. Sig: use BID and prn ssx high or low sugar. 100 Each 3     No current facility-administered medications for this visit.        Past Medical History:   Diagnosis Date   • Deep vein thrombosis (HCC) [I82.409] 2016   • Dyslipidemia 2013   • Former smoker 2016   • High cholesterol    • Hypothyroidism 2013   • Low vitamin D level 5/3/2018   • Obesity (BMI 30-39.9) 5/3/2018   • Type II or unspecified type diabetes mellitus without mention of complication, uncontrolled 2013     Past Surgical History:   Procedure Laterality Date   • CERVICAL DISK AND FUSION ANTERIOR  2013    Performed by Fred Post M.D. at SURGERY [facility]   • CERVICAL DECOMPRESSION POSTERIOR  2013    Performed by Fred Post M.D. at SURGERY 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   HEENT: Negative for  vision changes, hearing changes    Respiratory: Negative for shortness of breath  Cardiovascular: Negative for chest pain, palpitations  Gastrointestinal: Negative for blood in stool, constipation, diarrhea  Genitourinary: Negative for dysuria, polyuria  Musculoskeletal: Negative for myalgias, back pain, and joint pain.   Skin: Negative for rash  Neurological: Positive for numbness, tingling of left upper extremity  "  Psychiatric/Behavioral: Negative for depression, anxiety     Objective   Blood pressure 122/90, pulse 78, temperature 36.6 °C (97.9 °F), resp. rate 16, height 1.651 m (5' 5\"), weight 100.2 kg (221 lb), SpO2 95 %. Body mass index is 36.78 kg/m².    General: Alert, oriented. In no acute distress   HEET: EOMI, PERRL, conjunctiva non-injected, sclera non-icteric.  Nares patent with no significant congestion or drainage.  Tiny pinnae, external auditory canals, TM pearly gray with normal light reflex bilaterally.Oral mucous membranes pink and moist with no lesions.  Neck: supple with no cervical, subclavicular lymphadenopathy, JVD, palpable thyroid nodules   Lungs: clear to auscultation bilaterally with good excursion.  CV: regular rate and rhythm.  Abdomen soft, non-distended, non-tender with normal bowel sounds. No hepatosplenomegaly, no masses palpated  Skin: no lesions. Warm, dry   Psychiatric: appropriate mood and affect       Assessment and Plan   The following treatment plan was discussed     1. Low vitamin D level  Has history of low vitamin D, will check level and see if he needs to resume supplementation.   - VITAMIN D,25 HYDROXY; Future    2. Dyslipidemia  Well controlled on simvastatin.     Lab Results   Component Value Date/Time    CHOLSTRLTOT 127 08/15/2017 10:12 AM    LDL 54 08/15/2017 10:12 AM    HDL 45 08/15/2017 10:12 AM    TRIGLYCERIDE 140 08/15/2017 10:12 AM       3. Acute deep vein thrombosis (DVT) of femoral vein of right lower extremity (HCC)  On xarelto, it sounds like his DVT was unprovoked.     4. Other specified hypothyroidism  Will check TSH, last checked 3/2017.     5. Type 2 diabetes mellitus without complication, without long-term current use of insulin (HCC)  Due for repeat A1c, poct retinal eye exam and monofilament exam (normal) done today. Continue low dose ACE and statin.   - POCT Retinal Eye Exam  - Diabetic Monofilament LE Exam  - HEMOGLOBIN A1C; Future    6. Obesity (BMI " 30-39.9)  - Patient identified as having weight management issue.  Appropriate orders and counseling given.    7. Need for vaccination with 13-polyvalent pneumococcal conjugate vaccine  - Prevnar 13 PCV-13    8. Colon cancer screening  - OCCULT BLOOD FECES IMMUNOASSAY; Future      Return in about 3 months (around 8/3/2018).    Health Maintenance      Health Maintenance Due   Topic Date Due   • Annual Wellness Visit  1950   • RETINAL SCREENING  03/04/1966   • IMM DTaP/Tdap/Td Vaccine (1 - Tdap) 03/04/1967   • COLON CANCER SCREENING ANNUAL FIT  12/08/2017   • A1C SCREENING  02/15/2018       Baldomero Ganhdi MD  Internal Medicine  [facility]

## 2018-05-03 NOTE — ASSESSMENT & PLAN NOTE
He has a history of RLE DVT and has been on xarelto for 2 years. Travel wasn't a provoking factor for the RLE DVT.

## 2018-05-03 NOTE — PATIENT INSTRUCTIONS
"· Talk to your pharmacist about the TDap vaccination       You have been identified as having a Body Mass Index over 30, which is characterized as \"obese\".  Focus on consuming a dietary pattern that emphasizes intake of vegetables, fruits, and whole grains; includes low-fat dairy products, poultry, fish, legumes, nontropical vegetable oils and nuts; and limits intake of sweets, sugar-sweetened beverages and red meats.   Look up Mediterranean diet for more information.  "

## 2018-05-07 ENCOUNTER — HOSPITAL ENCOUNTER (OUTPATIENT)
Facility: MEDICAL CENTER | Age: 68
End: 2018-05-07
Attending: INTERNAL MEDICINE
Payer: MEDICARE

## 2018-05-07 PROCEDURE — 82274 ASSAY TEST FOR BLOOD FECAL: CPT

## 2018-05-11 DIAGNOSIS — Z12.11 COLON CANCER SCREENING: ICD-10-CM

## 2018-05-11 LAB — HEMOCCULT STL QL IA: NEGATIVE

## 2018-05-24 NOTE — TELEPHONE ENCOUNTER
See MA's notes below, pt has met protocol, OV earlier this month   Lab Results   Component Value Date/Time    CHOLSTRLTOT 127 08/15/2017 10:12 AM    LDL 54 08/15/2017 10:12 AM    HDL 45 08/15/2017 10:12 AM    TRIGLYCERIDE 140 08/15/2017 10:12 AM       Lab Results   Component Value Date/Time    SODIUM 137 08/15/2017 10:12 AM    POTASSIUM 4.1 08/15/2017 10:12 AM    CHLORIDE 103 08/15/2017 10:12 AM    CO2 26 08/15/2017 10:12 AM    GLUCOSE 192 (H) 08/15/2017 10:12 AM    BUN 20 08/15/2017 10:12 AM    CREATININE 0.73 08/15/2017 10:12 AM     Lab Results   Component Value Date/Time    ALKPHOSPHAT 40 08/15/2017 10:12 AM    ASTSGOT 12 08/15/2017 10:12 AM    ALTSGPT 15 08/15/2017 10:12 AM    TBILIRUBIN 0.8 08/15/2017 10:12 AM

## 2018-05-25 RX ORDER — SIMVASTATIN 20 MG
TABLET ORAL
Qty: 90 TAB | Refills: 0 | Status: SHIPPED | OUTPATIENT
Start: 2018-05-25 | End: 2018-08-20 | Stop reason: SDUPTHER

## 2018-06-01 LAB — RETINAL SCREEN: NEGATIVE

## 2018-06-04 NOTE — TELEPHONE ENCOUNTER
Was the patient seen in the last year in this department? Yes 05/03/2018    Does patient have an active prescription for medications requested? No     Received Request Via: Patient

## 2018-06-05 NOTE — TELEPHONE ENCOUNTER
See MA's notes below, pt has met protocol, OV last month   Lab Results   Component Value Date/Time    HBA1C 7.3 (H) 08/15/2017 10:12 AM

## 2018-07-19 DIAGNOSIS — I82.511 CHRONIC DEEP VEIN THROMBOSIS (DVT) OF FEMORAL VEIN OF RIGHT LOWER EXTREMITY (HCC): ICD-10-CM

## 2018-07-19 RX ORDER — RIVAROXABAN 20 MG/1
20 TABLET, FILM COATED ORAL
Qty: 90 TAB | Refills: 0 | Status: SHIPPED | OUTPATIENT
Start: 2018-07-19 | End: 2018-11-26 | Stop reason: SDUPTHER

## 2018-07-19 RX ORDER — LEVOTHYROXINE SODIUM 0.1 MG/1
100 TABLET ORAL
Qty: 90 TAB | Refills: 0 | Status: SHIPPED | OUTPATIENT
Start: 2018-07-19 | End: 2018-10-16 | Stop reason: SDUPTHER

## 2018-07-19 RX ORDER — GLIMEPIRIDE 2 MG/1
TABLET ORAL
Qty: 90 TAB | Refills: 0 | Status: SHIPPED | OUTPATIENT
Start: 2018-07-19 | End: 2018-08-13 | Stop reason: SDUPTHER

## 2018-07-19 RX ORDER — LISINOPRIL 2.5 MG/1
TABLET ORAL
Qty: 90 TAB | Refills: 0 | Status: SHIPPED | OUTPATIENT
Start: 2018-07-19 | End: 2018-10-16 | Stop reason: SDUPTHER

## 2018-07-19 NOTE — TELEPHONE ENCOUNTER
Was the patient seen in the last year in this department? Yes     Does patient have an active prescription for medications requested? No     Received Request Via: Pharmacy      Pt met protocol?: Yes, labs tsh 18.240, labs 8/17 a1c 7.3, ov 5/3/18 bp 122/90

## 2018-08-07 ENCOUNTER — HOSPITAL ENCOUNTER (OUTPATIENT)
Dept: LAB | Facility: MEDICAL CENTER | Age: 68
End: 2018-08-07
Attending: INTERNAL MEDICINE
Payer: MEDICARE

## 2018-08-07 DIAGNOSIS — R79.89 LOW VITAMIN D LEVEL: ICD-10-CM

## 2018-08-07 DIAGNOSIS — E11.9 TYPE 2 DIABETES MELLITUS WITHOUT COMPLICATION, WITHOUT LONG-TERM CURRENT USE OF INSULIN (HCC): ICD-10-CM

## 2018-08-07 DIAGNOSIS — E03.8 OTHER SPECIFIED HYPOTHYROIDISM: ICD-10-CM

## 2018-08-07 LAB
25(OH)D3 SERPL-MCNC: 15 NG/ML (ref 30–100)
EST. AVERAGE GLUCOSE BLD GHB EST-MCNC: 209 MG/DL
HBA1C MFR BLD: 8.9 % (ref 0–5.6)
TSH SERPL DL<=0.005 MIU/L-ACNC: 4.79 UIU/ML (ref 0.38–5.33)

## 2018-08-07 PROCEDURE — 82306 VITAMIN D 25 HYDROXY: CPT

## 2018-08-07 PROCEDURE — 84443 ASSAY THYROID STIM HORMONE: CPT

## 2018-08-07 PROCEDURE — 36415 COLL VENOUS BLD VENIPUNCTURE: CPT | Mod: GA

## 2018-08-07 PROCEDURE — 83036 HEMOGLOBIN GLYCOSYLATED A1C: CPT | Mod: GA

## 2018-08-07 RX ORDER — ERGOCALCIFEROL 1.25 MG/1
50000 CAPSULE ORAL
Qty: 12 CAP | Refills: 0 | Status: SHIPPED | OUTPATIENT
Start: 2018-08-07 | End: 2020-06-09 | Stop reason: SDUPTHER

## 2018-08-13 ENCOUNTER — OFFICE VISIT (OUTPATIENT)
Dept: MEDICAL GROUP | Facility: PHYSICIAN GROUP | Age: 68
End: 2018-08-13
Payer: MEDICARE

## 2018-08-13 VITALS
SYSTOLIC BLOOD PRESSURE: 134 MMHG | OXYGEN SATURATION: 96 % | BODY MASS INDEX: 32.43 KG/M2 | TEMPERATURE: 98.2 F | WEIGHT: 214 LBS | RESPIRATION RATE: 16 BRPM | HEART RATE: 71 BPM | HEIGHT: 68 IN | DIASTOLIC BLOOD PRESSURE: 76 MMHG

## 2018-08-13 DIAGNOSIS — I82.411 ACUTE DEEP VEIN THROMBOSIS (DVT) OF FEMORAL VEIN OF RIGHT LOWER EXTREMITY (HCC): ICD-10-CM

## 2018-08-13 DIAGNOSIS — E11.9 TYPE 2 DIABETES MELLITUS WITHOUT COMPLICATION, WITHOUT LONG-TERM CURRENT USE OF INSULIN (HCC): ICD-10-CM

## 2018-08-13 DIAGNOSIS — M48.02 CERVICAL STENOSIS OF SPINE: ICD-10-CM

## 2018-08-13 DIAGNOSIS — D17.9 LIPOMA, UNSPECIFIED SITE: ICD-10-CM

## 2018-08-13 PROCEDURE — 99214 OFFICE O/P EST MOD 30 MIN: CPT | Performed by: INTERNAL MEDICINE

## 2018-08-13 RX ORDER — GLIMEPIRIDE 2 MG/1
2 TABLET ORAL 2 TIMES DAILY
Qty: 60 TAB | Refills: 1 | Status: SHIPPED | OUTPATIENT
Start: 2018-08-13 | End: 2018-09-18

## 2018-08-13 RX ORDER — GABAPENTIN 600 MG/1
600 TABLET ORAL
Qty: 60 TAB | Refills: 1 | Status: SHIPPED | OUTPATIENT
Start: 2018-08-13 | End: 2019-07-18 | Stop reason: SDUPTHER

## 2018-08-13 ASSESSMENT — PAIN SCALES - GENERAL: PAINLEVEL: NO PAIN

## 2018-08-13 NOTE — PROGRESS NOTES
"RN-CDE Note    Subjective:     Health changes since last visit/interval Hx: Patient with Type II DM on metformin 1000 mg twice daily and glimepiride 2 mg am    Medications (including changes made today)  Current Outpatient Prescriptions   Medication Sig Dispense Refill   • vitamin D, Ergocalciferol, (DRISDOL) 39856 units Cap capsule Take 1 Cap by mouth every 7 days. 12 Cap 0   • levothyroxine (SYNTHROID) 100 MCG Tab TAKE 1 TAB BY MOUTH EVERY MORNING ON AN EMPTY STOMACH. 90 Tab 0   • lisinopril (PRINIVIL) 2.5 MG Tab TAKE ONE TABLET BY MOUTH ONCE DAILY 90 Tab 0   • XARELTO 20 MG Tab tablet TAKE 1 TAB BY MOUTH WITH DINNER. 90 Tab 0   • glimepiride (AMARYL) 2 MG Tab TAKE ONE TABLET BY MOUTH ONCE DAILY IN THE MORNING 90 Tab 0   • metformin (GLUCOPHAGE) 1000 MG tablet Take 1 Tab by mouth 2 times a day, with meals. 180 Tab 0   • simvastatin (ZOCOR) 20 MG Tab TAKE ONE TABLET BY MOUTH ONCE DAILY IN THE EVENING 90 Tab 0   • albuterol 108 (90 BASE) MCG/ACT Aero Soln inhalation aerosol Inhale 2 Puffs by mouth every 6 hours as needed for Shortness of Breath. 8.5 g 3   • Lancets Misc Lancets order: Lancets for One Touch Ultra 2 meter. Sig: use BID and prn ssx high or low sugar. 100 Each 3   • Blood Glucose Monitoring Suppl SUPPLIES Misc Test strips order: Test strips for One Touch Ultra 2 meter. Sig: use BID and prn ssx high or low sugar. 100 Each 3     No current facility-administered medications for this visit.        Taking daily ASA: No  Taking above medications as prescribed: yes  SIDE EFFECTS: Patient denies side effects to medications    Exercise: moderate regular exercise, aerobic < 3 days a week  Diet: \"unhealthy\" diet in general  Patient's body mass index is 32.54 kg/m². Exercise and nutrition counseling were performed at this visit.      Health Maintenance:   Health Maintenance Due   Topic Date Due   • Annual Wellness Visit  1950   • IMM DTaP/Tdap/Td Vaccine (1 - Tdap) 03/04/1967   • IMM ZOSTER VACCINES (1 of 2) " 03/04/1998       Immunizations:   PPSV23: Up-to-date  [id number]: Up-to-date  Tdap: Up-to-date  Flu: unknown  Hep B: unknown    DM:   Last A1c:   Lab Results   Component Value Date/Time    HBA1C 8.9 (H) 08/07/2018 08:24 AM      A1C GOAL: < 7    Glucose monitoring frequency: daily  249,259,227,207  Hypoglycemic episodes: no    Last Retinal Exam: on file and up-to-date  Daily Foot Exam: Yes   Routine Dental Exams: No    Lab Results   Component Value Date/Time    MALBCRT 11 08/15/2017 10:12 AM    MICROALBUR 1.4 08/15/2017 10:12 AM        ACR Albumin/Creatinine Ratio goal <30     HTN:   Blood pressure goal <140/<80 .   Currently Rx ACE/ARB: Yes    Dyslipidemia:    Lab Results   Component Value Date/Time    CHOLSTRLTOT 127 08/15/2017 10:12 AM    LDL 54 08/15/2017 10:12 AM    HDL 45 08/15/2017 10:12 AM    TRIGLYCERIDE 140 08/15/2017 10:12 AM       Lab Results   Component Value Date/Time    SODIUM 137 08/15/2017 10:12 AM    POTASSIUM 4.1 08/15/2017 10:12 AM    CHLORIDE 103 08/15/2017 10:12 AM    CO2 26 08/15/2017 10:12 AM    GLUCOSE 192 (H) 08/15/2017 10:12 AM    BUN 20 08/15/2017 10:12 AM    CREATININE 0.73 08/15/2017 10:12 AM     Lab Results   Component Value Date/Time    ALKPHOSPHAT 40 08/15/2017 10:12 AM    ASTSGOT 12 08/15/2017 10:12 AM    ALTSGPT 15 08/15/2017 10:12 AM    TBILIRUBIN 0.8 08/15/2017 10:12 AM        Currently Rx Statin: Yes    He  reports that he quit smoking about 11 years ago. He quit after 40.00 years of use. He has never used smokeless tobacco.    Objective:     Exam:  Monofilament: not done    Plan:     Discussed and educated on:   - All medications, side effects and compliance (discussed carefully)  - Annual eye examinations at Ophthalmology  - Foot Care: what to look for when checking feet every day  - Home glucose monitoring emphasized  - Weight control and daily exercise    Recommended medication changes: increase glimeperide to 1 tablet at breakfast and 1 tablet at dinner. FSBS 1 time per day,  record them and send results to CDE

## 2018-08-13 NOTE — ASSESSMENT & PLAN NOTE
Since his neck surgery a few years ago he still has persistent numbness/tingling of his left hand.

## 2018-08-13 NOTE — ASSESSMENT & PLAN NOTE
Most recent A1c was 8.9%. Taking metformin 1 g bid and glimepiride 2 mg daily. Did admit dietary indiscretions to the diabetes RN today.

## 2018-08-13 NOTE — PROGRESS NOTES
PRIMARY CARE CLINIC FOLLOW UP VISIT  Chief Complaint   Patient presents with   • Diabetes       History of Present Illness     Presenting with his wife for the following:     Type 2 diabetes mellitus without complication, without long-term current use of insulin ([facility])  Most recent A1c was 8.9%. Taking metformin 1 g bid and glimepiride 2 mg daily. Did admit dietary indiscretions to the diabetes RN today.     Deep vein thrombosis (HCC) [I82.409]  Still has occasional persistent pain of right lower extremity. Remains on xarelto.     Cervical stenosis of spine  Since his neck surgery a few years ago he still has persistent numbness/tingling of his left hand.     Lipoma  Noting a small mass on the right side of his face that is bothering him.     Current Outpatient Prescriptions   Medication Sig Dispense Refill   • gabapentin (NEURONTIN) 600 MG tablet Take 1 Tab by mouth every bedtime. 60 Tab 1   • glimepiride (AMARYL) 2 MG Tab Take 1 Tab by mouth 2 times a day. 60 Tab 1   • vitamin D, Ergocalciferol, (DRISDOL) 74026 units Cap capsule Take 1 Cap by mouth every 7 days. 12 Cap 0   • levothyroxine (SYNTHROID) 100 MCG Tab TAKE 1 TAB BY MOUTH EVERY MORNING ON AN EMPTY STOMACH. 90 Tab 0   • lisinopril (PRINIVIL) 2.5 MG Tab TAKE ONE TABLET BY MOUTH ONCE DAILY 90 Tab 0   • XARELTO 20 MG Tab tablet TAKE 1 TAB BY MOUTH WITH DINNER. 90 Tab 0   • metformin (GLUCOPHAGE) 1000 MG tablet Take 1 Tab by mouth 2 times a day, with meals. 180 Tab 0   • simvastatin (ZOCOR) 20 MG Tab TAKE ONE TABLET BY MOUTH ONCE DAILY IN THE EVENING 90 Tab 0   • albuterol 108 (90 BASE) MCG/ACT Aero Soln inhalation aerosol Inhale 2 Puffs by mouth every 6 hours as needed for Shortness of Breath. 8.5 g 3   • Lancets Misc Lancets order: Lancets for One Touch Ultra 2 meter. Sig: use BID and prn ssx high or low sugar. 100 Each 3   • Blood Glucose Monitoring Suppl SUPPLIES Misc Test strips order: Test strips for One Touch Ultra 2 meter. Sig: use BID and prn ssx high  "or low sugar. 100 Each 3     No current facility-administered medications for this visit.      Past Medical History:   Diagnosis Date   • Deep vein thrombosis (HCC) [I82.409] 2016   • Dyslipidemia 2013   • Former smoker 2016   • High cholesterol    • Hypothyroidism 2013   • Low vitamin D level 5/3/2018   • Obesity (BMI 30-39.9) 5/3/2018   • Type II or unspecified type diabetes mellitus without mention of complication, uncontrolled 2013     Past Surgical History:   Procedure Laterality Date   • CERVICAL DISK AND FUSION ANTERIOR  2013    Performed by Fred Post M.D. at SURGERY [facility]   • CERVICAL DECOMPRESSION POSTERIOR  2013    Performed by Fred Post M.D. 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 All other systems reviewed and negative.        Objective   Blood pressure 134/76, pulse 71, temperature 36.8 °C (98.2 °F), resp. rate 16, height 1.727 m (5' 8\"), weight 97.1 kg (214 lb), SpO2 96 %. Body mass index is 32.54 kg/m².    General: alert and oriented, pleasant, cooperative  HEENT: Normocephalic, atraumatic. No thyroid masses. Oropharynx clear without exudate or injection.   Cardiovascular: regular rate and rhythm, normal S1/S2  Pulmonary: lungs clear to auscultation bilaterally  Extremities: no edema bilateral lower extremities  Skin: small mobile mass of right face  Psychiatric: appropriate mood and affect. Good insight and appropriate judgment "     Assessment and Plan   The following treatment plan was discussed     1. Type 2 diabetes mellitus without complication, without long-term current use of insulin ([facility])  Most recent A1c 8.9%, uncontrolled. Does admit dietary indiscretion to diabetes RN. Continue metformin 1 g bid but increase glimepiride from 2 mg qAM to bid. He will call diabetes RN within a week with fasting blood sugars and dose adjust accordingly.   - LIPID PROFILE; Future  - MICROALBUMIN CREAT RATIO URINE; Future  - COMP METABOLIC PANEL; Future  - HEMOGLOBIN A1C; Future  - glimepiride (AMARYL) 2 MG Tab; Take 1 Tab by mouth 2 times a day.  Dispense: 60 Tab; Refill: 1    2. Acute deep vein thrombosis (DVT) of femoral vein of right lower extremity (HCC)  Having persistent occasional pain of right leg, check follow up ultrasound.   - US-EXTREMITY VENOUS UNILATERAL-LOWER RIGHT; Future    3. Cervical stenosis of spine  May have persisent residual cervical radiculopathy since his surgery. Will start with gabapentin 600 mg qHS and may dose up titrate from there as necessary.   - gabapentin (NEURONTIN) 600 MG tablet; Take 1 Tab by mouth every bedtime.  Dispense: 60 Tab; Refill: 1    4. Lipoma, unspecified site  - REFERRAL TO DERMATOLOGY      Healthcare maintenance     Health Maintenance Due   Topic Date Due   • Annual Wellness Visit  1950   • IMM DTaP/Tdap/Td Vaccine (1 - Tdap) 03/04/1967   • IMM ZOSTER VACCINES (1 of 2) 03/04/1998     Discussed Shingrix vaccine and Tdap (Shingrix currently on back order in this office)     Return in about 3 months (around 11/13/2018).    Baldomero Gandhi MD  Internal Medicine  [facility]

## 2018-08-21 RX ORDER — SIMVASTATIN 20 MG
TABLET ORAL
Qty: 90 TAB | Refills: 0 | Status: SHIPPED | OUTPATIENT
Start: 2018-08-21 | End: 2018-11-18 | Stop reason: SDUPTHER

## 2018-08-21 NOTE — TELEPHONE ENCOUNTER
Was the patient seen in the last year in this department? Yes    Does patient have an active prescription for medications requested? No     Received Request Via: Patient      Pt met protocol?: Yes, lipid labs 8/17 lipid lab orders 8/18 ov pcp 8/18

## 2018-09-04 NOTE — TELEPHONE ENCOUNTER
Patient has recently been seen by PCP within the last 6 months per protocol (8/18). Will refill medications for 6 months.  Lab Results   Component Value Date/Time    HBA1C 8.9 (H) 08/07/2018 08:24 AM      Lab Results   Component Value Date/Time    MALBCRT 11 08/15/2017 10:12 AM    MICROALBUR 1.4 08/15/2017 10:12 AM      Lab Results   Component Value Date/Time    ALKPHOSPHAT 40 08/15/2017 10:12 AM    ASTSGOT 12 08/15/2017 10:12 AM    ALTSGPT 15 08/15/2017 10:12 AM    TBILIRUBIN 0.8 08/15/2017 10:12 AM

## 2018-09-06 NOTE — TELEPHONE ENCOUNTER
Was the patient seen in the last year in this department? Yes    Does patient have an active prescription for medications requested? No     Received Request Via: Pharmacy      Pt met protocol?: Yes pt last ov 8/2018   Lab Results   Component Value Date/Time    HBA1C 8.9 (H) 08/07/2018 08:24 AM        Lab Results  Component Value Date/Time   CHOLSTRLTOT 127 08/15/2017 1012   TRIGLYCERIDE 140 08/15/2017 1012   HDL 45 08/15/2017 1012   LDL 54 08/15/2017 1012

## 2018-09-18 ENCOUNTER — TELEPHONE (OUTPATIENT)
Dept: HEALTH INFORMATION MANAGEMENT | Facility: MEDICAL CENTER | Age: 68
End: 2018-09-18

## 2018-09-18 DIAGNOSIS — E11.9 TYPE 2 DIABETES MELLITUS WITHOUT COMPLICATION, WITHOUT LONG-TERM CURRENT USE OF INSULIN (HCC): ICD-10-CM

## 2018-09-18 RX ORDER — GLIMEPIRIDE 4 MG/1
4 TABLET ORAL 2 TIMES DAILY
Qty: 180 TAB | Refills: 1 | Status: SHIPPED | OUTPATIENT
Start: 2018-09-18 | End: 2019-03-24 | Stop reason: SDUPTHER

## 2018-09-18 NOTE — TELEPHONE ENCOUNTER
Patient sends FSBG results:    Fastin-215  Lunch: 190-221  Dinner: 202-230  Bed: 126-244  Hypoglycemia: none  Current diabetes medications: metformin 1000 mg twice daily and glimepiride 2 mg am and dinner  Recommendations:  Increase glimepiride to 4 mg twice daily

## 2018-09-24 NOTE — TELEPHONE ENCOUNTER
Was the patient seen in the last year in this department? Yes    Does patient have an active prescription for medications requested? No     Received Request Via: Pharmacy     Pharm call

## 2018-09-26 NOTE — TELEPHONE ENCOUNTER
Patient has recently been seen by PCP within the last 6 months per protocol (8/18). Will refill DM supplies for 12 months.  Lab Results   Component Value Date/Time    HBA1C 8.9 (H) 08/07/2018 08:24 AM      Lab Results   Component Value Date/Time    MALBCRT 11 08/15/2017 10:12 AM    MICROALBUR 1.4 08/15/2017 10:12 AM      Lab Results   Component Value Date/Time    ALKPHOSPHAT 40 08/15/2017 10:12 AM    ASTSGOT 12 08/15/2017 10:12 AM    ALTSGPT 15 08/15/2017 10:12 AM    TBILIRUBIN 0.8 08/15/2017 10:12 AM

## 2018-10-17 RX ORDER — LEVOTHYROXINE SODIUM 0.1 MG/1
100 TABLET ORAL
Qty: 90 TAB | Refills: 1 | Status: SHIPPED | OUTPATIENT
Start: 2018-10-17 | End: 2019-04-10 | Stop reason: SDUPTHER

## 2018-10-17 RX ORDER — LISINOPRIL 2.5 MG/1
TABLET ORAL
Qty: 90 TAB | Refills: 1 | Status: SHIPPED | OUTPATIENT
Start: 2018-10-17 | End: 2019-04-11 | Stop reason: SDUPTHER

## 2018-10-17 NOTE — TELEPHONE ENCOUNTER
Patient was last seen by PCP 8/18. Last TSH read 8/18 (4.79). Will refill for 6 months.    Lab Results   Component Value Date/Time    SODIUM 137 08/15/2017 10:12 AM    POTASSIUM 4.1 08/15/2017 10:12 AM    CHLORIDE 103 08/15/2017 10:12 AM    CO2 26 08/15/2017 10:12 AM    GLUCOSE 192 (H) 08/15/2017 10:12 AM    BUN 20 08/15/2017 10:12 AM    CREATININE 0.73 08/15/2017 10:12 AM

## 2018-10-17 NOTE — TELEPHONE ENCOUNTER
Was the patient seen in the last year in this department? Yes    Does patient have an active prescription for medications requested? No     Received Request Via: Pharmacy      Pt met protocol?: Yes    OV & TSH 8/18     BP Readings from Last 1 Encounters:   08/13/18 134/76

## 2018-10-19 ENCOUNTER — HOSPITAL ENCOUNTER (OUTPATIENT)
Dept: RADIOLOGY | Facility: MEDICAL CENTER | Age: 68
End: 2018-10-19
Attending: INTERNAL MEDICINE
Payer: MEDICARE

## 2018-10-19 DIAGNOSIS — I82.411 ACUTE DEEP VEIN THROMBOSIS (DVT) OF FEMORAL VEIN OF RIGHT LOWER EXTREMITY (HCC): ICD-10-CM

## 2018-10-19 PROCEDURE — 93971 EXTREMITY STUDY: CPT | Mod: RT

## 2018-11-19 RX ORDER — SIMVASTATIN 20 MG
TABLET ORAL
Qty: 90 TAB | Refills: 0 | Status: SHIPPED | OUTPATIENT
Start: 2018-11-19 | End: 2018-12-09 | Stop reason: SDUPTHER

## 2018-11-26 DIAGNOSIS — I82.511 CHRONIC DEEP VEIN THROMBOSIS (DVT) OF FEMORAL VEIN OF RIGHT LOWER EXTREMITY (HCC): ICD-10-CM

## 2018-11-26 NOTE — TELEPHONE ENCOUNTER
Was the patient seen in the last year in this department? Yes    Does patient have an active prescription for medications requested? No     Received Request Via: Pharmacy      Pt met protocol?: Yes pt last ov 8/18   INR   Date Value Ref Range Status   03/31/2017 1.1  Final     Comment:     [id number] 2/2018 ic valid     No results found for: LYDIAR

## 2018-11-29 NOTE — TELEPHONE ENCOUNTER
Phone Number Called: [phone]    Message: Pt Informed     Left Message for patient to call back: no

## 2019-01-17 NOTE — TELEPHONE ENCOUNTER
Was the patient seen in the last year in this department? Yes    Does patient have an active prescription for medications requested? No     Received Request Via: Pharmacy      Pt met protocol?: Yes, labs and ov 8/18, a1c 8.9

## 2019-03-11 DIAGNOSIS — I82.511 CHRONIC DEEP VEIN THROMBOSIS (DVT) OF FEMORAL VEIN OF RIGHT LOWER EXTREMITY (HCC): ICD-10-CM

## 2019-03-11 NOTE — TELEPHONE ENCOUNTER
Was the patient seen in the last year in this department? Yes    Does patient have an active prescription for medications requested? No     Received Request Via: Pharmacy      Pt met protocol?: NO    BP Readings from Last 1 Encounters:   08/13/18 134/76

## 2019-04-10 NOTE — TELEPHONE ENCOUNTER
Was the patient seen in the last year in this department? Yes    Does patient have an active prescription for medications requested? No     Received Request Via: Pharmacy      Pt met protocol?: Yes    OV 8/18    TSH 8/18

## 2019-04-11 RX ORDER — LEVOTHYROXINE SODIUM 0.1 MG/1
100 TABLET ORAL
Qty: 90 TAB | Refills: 1 | Status: SHIPPED | OUTPATIENT
Start: 2019-04-11 | End: 2019-10-12 | Stop reason: SDUPTHER

## 2019-04-11 NOTE — TELEPHONE ENCOUNTER
Patient was last seen by PCP 8/18. Last TSH read 8/18 (4.790). Will refill for 6 months. Patient is due for an appointment. Please schedule.

## 2019-04-15 ENCOUNTER — HOSPITAL ENCOUNTER (OUTPATIENT)
Dept: LAB | Facility: MEDICAL CENTER | Age: 69
End: 2019-04-15
Attending: INTERNAL MEDICINE
Payer: MEDICARE

## 2019-04-15 DIAGNOSIS — E11.9 TYPE 2 DIABETES MELLITUS WITHOUT COMPLICATION, WITHOUT LONG-TERM CURRENT USE OF INSULIN (HCC): ICD-10-CM

## 2019-04-15 LAB
ALBUMIN SERPL BCP-MCNC: 4.2 G/DL (ref 3.2–4.9)
ALBUMIN/GLOB SERPL: 1.7 G/DL
ALP SERPL-CCNC: 49 U/L (ref 30–99)
ALT SERPL-CCNC: 19 U/L (ref 2–50)
ANION GAP SERPL CALC-SCNC: 10 MMOL/L (ref 0–11.9)
AST SERPL-CCNC: 15 U/L (ref 12–45)
BILIRUB SERPL-MCNC: 1 MG/DL (ref 0.1–1.5)
BUN SERPL-MCNC: 22 MG/DL (ref 8–22)
CALCIUM SERPL-MCNC: 9.3 MG/DL (ref 8.5–10.5)
CHLORIDE SERPL-SCNC: 105 MMOL/L (ref 96–112)
CHOLEST SERPL-MCNC: 133 MG/DL (ref 100–199)
CO2 SERPL-SCNC: 25 MMOL/L (ref 20–33)
CREAT SERPL-MCNC: 0.81 MG/DL (ref 0.5–1.4)
CREAT UR-MCNC: 202.4 MG/DL
EST. AVERAGE GLUCOSE BLD GHB EST-MCNC: 209 MG/DL
FASTING STATUS PATIENT QL REPORTED: NORMAL
GLOBULIN SER CALC-MCNC: 2.5 G/DL (ref 1.9–3.5)
GLUCOSE SERPL-MCNC: 234 MG/DL (ref 65–99)
HBA1C MFR BLD: 8.9 % (ref 0–5.6)
HDLC SERPL-MCNC: 41 MG/DL
LDLC SERPL CALC-MCNC: 58 MG/DL
MICROALBUMIN UR-MCNC: 3 MG/DL
MICROALBUMIN/CREAT UR: 15 MG/G (ref 0–30)
POTASSIUM SERPL-SCNC: 3.9 MMOL/L (ref 3.6–5.5)
PROT SERPL-MCNC: 6.7 G/DL (ref 6–8.2)
SODIUM SERPL-SCNC: 140 MMOL/L (ref 135–145)
TRIGL SERPL-MCNC: 168 MG/DL (ref 0–149)

## 2019-04-15 PROCEDURE — 36415 COLL VENOUS BLD VENIPUNCTURE: CPT

## 2019-04-15 PROCEDURE — 80061 LIPID PANEL: CPT

## 2019-04-15 PROCEDURE — 82570 ASSAY OF URINE CREATININE: CPT

## 2019-04-15 PROCEDURE — 82043 UR ALBUMIN QUANTITATIVE: CPT

## 2019-04-15 PROCEDURE — 80053 COMPREHEN METABOLIC PANEL: CPT

## 2019-04-15 PROCEDURE — 83036 HEMOGLOBIN GLYCOSYLATED A1C: CPT | Mod: GA

## 2019-04-16 ENCOUNTER — TELEPHONE (OUTPATIENT)
Dept: MEDICAL GROUP | Facility: PHYSICIAN GROUP | Age: 69
End: 2019-04-16

## 2019-04-16 NOTE — TELEPHONE ENCOUNTER
----- Message from Baldomero Gandhi M.D. sent at 4/15/2019  4:41 PM PDT -----  Please let him know that his A1c (a measure of diabetes control) is still not at goal, needs f/u with me and DM RN

## 2019-04-19 NOTE — TELEPHONE ENCOUNTER
Was the patient seen in the last year in this department? Yes    Does patient have an active prescription for medications requested? No     Received Request Via: Pharmacy      Pt met protocol?: Yes    OV 8/18

## 2019-05-06 ENCOUNTER — OFFICE VISIT (OUTPATIENT)
Dept: MEDICAL GROUP | Facility: PHYSICIAN GROUP | Age: 69
End: 2019-05-06
Payer: MEDICARE

## 2019-05-06 VITALS
SYSTOLIC BLOOD PRESSURE: 124 MMHG | HEIGHT: 68 IN | BODY MASS INDEX: 34.4 KG/M2 | DIASTOLIC BLOOD PRESSURE: 82 MMHG | HEART RATE: 64 BPM | WEIGHT: 227 LBS | TEMPERATURE: 97.9 F | OXYGEN SATURATION: 98 % | RESPIRATION RATE: 16 BRPM

## 2019-05-06 DIAGNOSIS — M48.02 CERVICAL STENOSIS OF SPINE: ICD-10-CM

## 2019-05-06 DIAGNOSIS — E11.9 TYPE 2 DIABETES MELLITUS WITHOUT COMPLICATION, WITHOUT LONG-TERM CURRENT USE OF INSULIN (HCC): ICD-10-CM

## 2019-05-06 PROCEDURE — 99214 OFFICE O/P EST MOD 30 MIN: CPT | Performed by: INTERNAL MEDICINE

## 2019-05-06 RX ORDER — PIOGLITAZONEHYDROCHLORIDE 15 MG/1
15 TABLET ORAL DAILY
Qty: 30 TAB | Refills: 11 | Status: SHIPPED | OUTPATIENT
Start: 2019-05-06 | End: 2020-03-03 | Stop reason: SDUPTHER

## 2019-05-06 NOTE — PROGRESS NOTES
"RN-CDE Note    Subjective:     Health changes since last visit/interval Hx: None    Medications (including changes made today)  Current Outpatient Prescriptions   Medication Sig Dispense Refill   • metformin (GLUCOPHAGE) 1000 MG tablet TAKE 1 TABLET BY MOUTH TWICE A DAY WITH MEALS 180 Tab 0   • lisinopril (PRINIVIL) 2.5 MG Tab TAKE 1 TABLET BY MOUTH EVERY DAY 90 Tab 0   • levothyroxine (SYNTHROID) 100 MCG Tab TAKE 1 TAB BY MOUTH EVERY MORNING ON AN EMPTY STOMACH. 90 Tab 1   • glimepiride (AMARYL) 4 MG Tab TAKE 1 TABLET BY MOUTH TWICE A  Tab 0   • rivaroxaban (XARELTO) 20 MG Tab tablet Take 1 Tab by mouth with dinner. 90 Tab 0   • Blood Glucose Test Strips Test strips order: Test strips for One Touch Ultra 2 meter. Sig: use BID and prn ssx high or low sugar. 200 Each 3   • simvastatin (ZOCOR) 20 MG Tab TAKE 1 TABLET BY MOUTH EVERY DAY IN THE EVENING 90 Tab 0   • Lancets Misc Lancets order: Lancets for One Touch Ultra 2 meter. Sig: use BID and prn ssx high or low sugar. 100 Each 3   • gabapentin (NEURONTIN) 600 MG tablet Take 1 Tab by mouth every bedtime. (Patient not taking: Reported on 5/6/2019) 60 Tab 1   • vitamin D, Ergocalciferol, (DRISDOL) 83421 units Cap capsule Take 1 Cap by mouth every 7 days. (Patient not taking: Reported on 5/6/2019) 12 Cap 0   • albuterol 108 (90 BASE) MCG/ACT Aero Soln inhalation aerosol Inhale 2 Puffs by mouth every 6 hours as needed for Shortness of Breath. 8.5 g 3     No current facility-administered medications for this visit.        Taking daily ASA: No  Taking above medications as prescribed: yes  SIDE EFFECTS: Patient denies side effects to medications    Exercise: sporadic irregular exercise, <half hour walking weekly  Diet: \"healthy\" diet  in general  Patient's body mass index is 34.52 kg/m². Exercise and nutrition counseling were performed at this visit.  Vitals:    05/06/19 0820   BP: 124/82   Pulse: 64   Resp: 16   Temp: 36.6 °C (97.9 °F)   SpO2: 98%         Health " Maintenance:   Health Maintenance Due   Topic Date Due   • Annual Wellness Visit  1950   • IMM HEP B VACCINE (1 of 3 - Risk 3-dose series) 03/04/1967   • IMM DTaP/Tdap/Td Vaccine (1 - Tdap) 03/04/1967   • IMM ZOSTER VACCINES (1 of 2) 03/04/1998   • RETINAL SCREENING  05/03/2019   • IMM PNEUMOCOCCAL 65+ (ADULT) LOW/MEDIUM RISK SERIES (2 of 2 - PPSV23) 05/03/2019   • DIABETES MONOFILAMENT / LE EXAM  05/03/2019   • COLON CANCER SCREENING ANNUAL FIT  05/07/2019       Immunizations:   PPSV23: Due  Dhnlole71: Due  Tdap: Due  Flu: Up-to-date  Hep B: Due    DM:   Last A1c:   Lab Results   Component Value Date/Time    HBA1C 8.9 (H) 04/15/2019 08:51 AM      A1C GOAL: < 7    Glucose monitoring frequency: daily  200  Hypoglycemic episodes: no    Last Retinal Exam: schedule   Daily Foot Exam: Yes   Routine Dental Exams: No    Lab Results   Component Value Date/Time    MALBCRT 15 04/15/2019 08:51 AM    MICROALBUR 3.0 04/15/2019 08:51 AM        ACR Albumin/Creatinine Ratio goal <30     HTN:   Blood pressure goal <140/<80 .   Currently Rx ACE/ARB: Yes    Dyslipidemia:    Lab Results   Component Value Date/Time    CHOLSTRLTOT 133 04/15/2019 08:51 AM    LDL 58 04/15/2019 08:51 AM    HDL 41 04/15/2019 08:51 AM    TRIGLYCERIDE 168 (H) 04/15/2019 08:51 AM       Lab Results   Component Value Date/Time    SODIUM 140 04/15/2019 08:51 AM    POTASSIUM 3.9 04/15/2019 08:51 AM    CHLORIDE 105 04/15/2019 08:51 AM    CO2 25 04/15/2019 08:51 AM    GLUCOSE 234 (H) 04/15/2019 08:51 AM    BUN 22 04/15/2019 08:51 AM    CREATININE 0.81 04/15/2019 08:51 AM     Lab Results   Component Value Date/Time    ALKPHOSPHAT 49 04/15/2019 08:51 AM    ASTSGOT 15 04/15/2019 08:51 AM    ALTSGPT 19 04/15/2019 08:51 AM    TBILIRUBIN 1.0 04/15/2019 08:51 AM        Currently Rx Statin: Yes    He  reports that he quit smoking about 12 years ago. He quit after 40.00 years of use. He has never used smokeless tobacco.    Objective:     Exam:  Monofilament: done    Monofilament testing with a 10 gram force: sensation intact: intact bilaterally  Visual Inspection: Feet without maceration, ulcers, fissures.  Pedal pulses: intact bilaterally    Plan:     Discussed and educated on:   - All medications, side effects and compliance (discussed carefully)  - Annual eye examinations at Ophthalmology  - Factors Affecting Blood Glucose Control: medication  - Home glucose monitoring emphasized  - Weight control and daily exercise    Recommended medication changes: decrease carbohydrates increase proteins start actos 15 mg daily

## 2019-05-06 NOTE — PROGRESS NOTES
PRIMARY CARE CLINIC FOLLOW UP VISIT  Chief Complaint   Patient presents with   • Diabetes Mellitus     History of Present Illness     Cervical stenosis of spine  Continues to have intermittent neck pain issues since his neck surgery.     Type 2 diabetes mellitus without complication, without long-term current use of insulin ([facility])  A1c is 8.9% on glimepiride and metformin. He does enjoy carbohydrates.     Current Outpatient Prescriptions   Medication Sig Dispense Refill   • pioglitazone (ACTOS) 15 MG Tab Take 1 Tab by mouth every day. 30 Tab 11   • metformin (GLUCOPHAGE) 1000 MG tablet TAKE 1 TABLET BY MOUTH TWICE A DAY WITH MEALS 180 Tab 0   • lisinopril (PRINIVIL) 2.5 MG Tab TAKE 1 TABLET BY MOUTH EVERY DAY 90 Tab 0   • levothyroxine (SYNTHROID) 100 MCG Tab TAKE 1 TAB BY MOUTH EVERY MORNING ON AN EMPTY STOMACH. 90 Tab 1   • glimepiride (AMARYL) 4 MG Tab TAKE 1 TABLET BY MOUTH TWICE A  Tab 0   • rivaroxaban (XARELTO) 20 MG Tab tablet Take 1 Tab by mouth with dinner. 90 Tab 0   • Blood Glucose Test Strips Test strips order: Test strips for One Touch Ultra 2 meter. Sig: use BID and prn ssx high or low sugar. 200 Each 3   • simvastatin (ZOCOR) 20 MG Tab TAKE 1 TABLET BY MOUTH EVERY DAY IN THE EVENING 90 Tab 0   • Lancets Misc Lancets order: Lancets for One Touch Ultra 2 meter. Sig: use BID and prn ssx high or low sugar. 100 Each 3   • gabapentin (NEURONTIN) 600 MG tablet Take 1 Tab by mouth every bedtime. (Patient not taking: Reported on 5/6/2019) 60 Tab 1   • vitamin D, Ergocalciferol, (DRISDOL) 50989 units Cap capsule Take 1 Cap by mouth every 7 days. (Patient not taking: Reported on 5/6/2019) 12 Cap 0   • albuterol 108 (90 BASE) MCG/ACT Aero Soln inhalation aerosol Inhale 2 Puffs by mouth every 6 hours as needed for Shortness of Breath. 8.5 g 3     No current facility-administered medications for this visit.      Past Medical History:   Diagnosis Date   • Deep vein thrombosis (HCC) [I82.409] 12/9/2016   •  "Dyslipidemia 2013   • Former smoker 2016   • High cholesterol    • Hypothyroidism 2013   • Low vitamin D level 5/3/2018   • Obesity (BMI 30-39.9) 5/3/2018   • Type II or unspecified type diabetes mellitus without mention of complication, uncontrolled 2013     Past Surgical History:   Procedure Laterality Date   • CERVICAL DISK AND FUSION ANTERIOR  2013    Performed by Fred Post M.D. at SURGERY [facility]   • CERVICAL DECOMPRESSION POSTERIOR  2013    Performed by Fred Post M.D. 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 All other systems reviewed and negative.        Objective   /82 (BP Location: Right arm, Patient Position: Sitting, BP Cuff Size: Adult)   Pulse 64   Temp 36.6 °C (97.9 °F) (Temporal)   Resp 16   Ht 1.727 m (5' 8\")   Wt 103 kg (227 lb)   SpO2 98%  Body mass index is 34.52 kg/m².    General: alert and oriented, pleasant, cooperative  HEENT: Normocephalic, atraumatic.   Psychiatric: appropriate mood and affect. Good insight and appropriate judgment     Assessment and Plan   The following treatment plan was discussed     1. Type 2 diabetes mellitus without complication, without long-term current use of insulin (HCC)  Most recent A1c at 8.9%, continue glimepiride and metformin and add actos 15 mg daily. DM RN also discussed carbohydrate lowering and increasing protein, check repeat in 3 months.   - Diabetic Monofilament LE Exam    2. " Cervical stenosis of spine  Offered formal PT but he declined for now, given packet of home exercises to start.     Healthcare maintenance     Health Maintenance Due   Topic Date Due   • Annual Wellness Visit  1950   • IMM HEP B VACCINE (1 of 3 - Risk 3-dose series) 03/04/1967   • IMM DTaP/Tdap/Td Vaccine (1 - Tdap) 03/04/1967   • IMM ZOSTER VACCINES (1 of 2) 03/04/1998   • RETINAL SCREENING  05/03/2019   • IMM PNEUMOCOCCAL 65+ (ADULT) LOW/MEDIUM RISK SERIES (2 of 2 - PPSV23) 05/03/2019   • DIABETES MONOFILAMENT / LE EXAM  05/03/2019   • COLON CANCER SCREENING ANNUAL FIT  05/07/2019     Return in about 3 months (around 8/6/2019) for DM RN and me .    Baldomero Gandhi MD  Internal Medicine  [facility]

## 2019-06-08 DIAGNOSIS — I82.511 CHRONIC DEEP VEIN THROMBOSIS (DVT) OF FEMORAL VEIN OF RIGHT LOWER EXTREMITY (HCC): ICD-10-CM

## 2019-06-10 RX ORDER — RIVAROXABAN 20 MG/1
20 TABLET, FILM COATED ORAL
Qty: 90 TAB | Refills: 0 | Status: SHIPPED | OUTPATIENT
Start: 2019-06-10 | End: 2019-08-25 | Stop reason: SDUPTHER

## 2019-06-10 RX ORDER — SIMVASTATIN 20 MG
TABLET ORAL
Qty: 90 TAB | Refills: 0 | Status: SHIPPED | OUTPATIENT
Start: 2019-06-10 | End: 2019-09-13 | Stop reason: SDUPTHER

## 2019-06-10 NOTE — TELEPHONE ENCOUNTER
Was the patient seen in the last year in this department? Yes    Does patient have an active prescription for medications requested? No     Received Request Via: Pharmacy      Pt met protocol?: yes    Pt last ov 5/2019   INR   Date Value Ref Range Status   03/31/2017 1.1  Final     Comment:     [id number] 2/2018 ic valid     Lab Results  Component Value Date/Time   CHOLSTRLTOT 133 04/15/2019 0851   TRIGLYCERIDE 168 (H) 04/15/2019 0851   HDL 41 04/15/2019 0851   LDL 58 04/15/2019 0851     Lab Results   Component Value Date/Time    SODIUM 140 04/15/2019 08:51 AM    POTASSIUM 3.9 04/15/2019 08:51 AM    CHLORIDE 105 04/15/2019 08:51 AM    CO2 25 04/15/2019 08:51 AM    GLUCOSE 234 (H) 04/15/2019 08:51 AM    BUN 22 04/15/2019 08:51 AM    CREATININE 0.81 04/15/2019 08:51 AM

## 2019-07-15 RX ORDER — LISINOPRIL 2.5 MG/1
TABLET ORAL
Qty: 90 TAB | Refills: 0 | Status: SHIPPED | OUTPATIENT
Start: 2019-07-15 | End: 2019-10-13 | Stop reason: SDUPTHER

## 2019-07-15 NOTE — TELEPHONE ENCOUNTER
Was the patient seen in the last year in this department? Yes    Does patient have an active prescription for medications requested? No     Received Request Via: Pharmacy      Pt met protocol?: Yes, OV 5/19   Lab Results   Component Value Date/Time    HBA1C 8.9 (H) 04/15/2019 08:51 AM     BP Readings from Last 1 Encounters:   05/06/19 124/82

## 2019-07-18 ENCOUNTER — OFFICE VISIT (OUTPATIENT)
Dept: MEDICAL GROUP | Facility: PHYSICIAN GROUP | Age: 69
End: 2019-07-18
Payer: MEDICARE

## 2019-07-18 VITALS
OXYGEN SATURATION: 97 % | HEIGHT: 68 IN | SYSTOLIC BLOOD PRESSURE: 140 MMHG | WEIGHT: 222 LBS | TEMPERATURE: 98.3 F | DIASTOLIC BLOOD PRESSURE: 82 MMHG | BODY MASS INDEX: 33.65 KG/M2 | RESPIRATION RATE: 16 BRPM | HEART RATE: 68 BPM

## 2019-07-18 DIAGNOSIS — R79.89 LOW VITAMIN D LEVEL: ICD-10-CM

## 2019-07-18 DIAGNOSIS — E11.9 TYPE 2 DIABETES MELLITUS WITHOUT COMPLICATION, WITHOUT LONG-TERM CURRENT USE OF INSULIN (HCC): ICD-10-CM

## 2019-07-18 DIAGNOSIS — Z12.12 SCREENING FOR COLORECTAL CANCER: ICD-10-CM

## 2019-07-18 DIAGNOSIS — Z12.11 SCREENING FOR COLORECTAL CANCER: ICD-10-CM

## 2019-07-18 DIAGNOSIS — M79.604 PAIN OF RIGHT LOWER EXTREMITY: ICD-10-CM

## 2019-07-18 PROBLEM — M79.606 LEG PAIN: Status: ACTIVE | Noted: 2019-07-18

## 2019-07-18 PROCEDURE — 99214 OFFICE O/P EST MOD 30 MIN: CPT | Performed by: INTERNAL MEDICINE

## 2019-07-18 RX ORDER — IBUPROFEN 800 MG/1
800 TABLET ORAL EVERY 8 HOURS PRN
COMMUNITY
End: 2021-01-11

## 2019-07-18 RX ORDER — GABAPENTIN 600 MG/1
TABLET ORAL
Qty: 135 TAB | Refills: 3 | Status: SHIPPED | OUTPATIENT
Start: 2019-07-18 | End: 2020-03-03 | Stop reason: SDUPTHER

## 2019-07-18 ASSESSMENT — PATIENT HEALTH QUESTIONNAIRE - PHQ9: CLINICAL INTERPRETATION OF PHQ2 SCORE: 0

## 2019-07-18 NOTE — ASSESSMENT & PLAN NOTE
Was doing yard work about 2-3 weeks ago. He then started having burning pain of his right lower extremity. Pain is worse when he walks. He started taking gabapentin 600 mg at bedtime and then 300 mg in the morning with about 4 ibuprofen. The pain started right after he tripped over something and caught himself on the rails. Denies back pain.

## 2019-07-18 NOTE — PROGRESS NOTES
PRIMARY CARE CLINIC FOLLOW UP VISIT  Chief Complaint   Patient presents with   • Leg Pain     right leg/x 3 weeks      History of Present Illness     Leg pain  Was doing yard work about 2-3 weeks ago. He then started having burning pain of his right lower extremity. Pain is worse when he walks. He started taking gabapentin 600 mg at bedtime and then 300 mg in the morning with about 4 ibuprofen. The pain started right after he tripped over something and caught himself on the rails. Denies back pain.     Current Outpatient Prescriptions   Medication Sig Dispense Refill   • ibuprofen (MOTRIN) 800 MG Tab Take 800 mg by mouth every 8 hours as needed.     • gabapentin (NEURONTIN) 600 MG tablet Take 1/2 tablet in the morning and full tablet at bedtime 135 Tab 3   • metformin (GLUCOPHAGE) 1000 MG tablet TAKE 1 TABLET BY MOUTH TWICE A DAY WITH MEALS 180 Tab 0   • lisinopril (PRINIVIL) 2.5 MG Tab TAKE 1 TABLET BY MOUTH EVERY DAY 90 Tab 0   • XARELTO 20 MG Tab tablet TAKE 1 TAB BY MOUTH WITH DINNER. 90 Tab 0   • simvastatin (ZOCOR) 20 MG Tab TAKE 1 TABLET BY MOUTH EVERY DAY IN THE EVENING 90 Tab 0   • pioglitazone (ACTOS) 15 MG Tab Take 1 Tab by mouth every day. 30 Tab 11   • levothyroxine (SYNTHROID) 100 MCG Tab TAKE 1 TAB BY MOUTH EVERY MORNING ON AN EMPTY STOMACH. 90 Tab 1   • glimepiride (AMARYL) 4 MG Tab TAKE 1 TABLET BY MOUTH TWICE A  Tab 0   • Blood Glucose Test Strips Test strips order: Test strips for One Touch Ultra 2 meter. Sig: use BID and prn ssx high or low sugar. 200 Each 3   • vitamin D, Ergocalciferol, (DRISDOL) 75374 units Cap capsule Take 1 Cap by mouth every 7 days. 12 Cap 0   • Lancets Misc Lancets order: Lancets for One Touch Ultra 2 meter. Sig: use BID and prn ssx high or low sugar. 100 Each 3   • albuterol 108 (90 BASE) MCG/ACT Aero Soln inhalation aerosol Inhale 2 Puffs by mouth every 6 hours as needed for Shortness of Breath. 8.5 g 3     No current facility-administered medications for this  "visit.      Past Medical History:   Diagnosis Date   • Deep vein thrombosis (HCC) [I82.409] 2016   • Dyslipidemia 2013   • Former smoker 2016   • High cholesterol    • Hypothyroidism 2013   • Low vitamin D level 5/3/2018   • Obesity (BMI 30-39.9) 5/3/2018   • Type II or unspecified type diabetes mellitus without mention of complication, uncontrolled 2013     Past Surgical History:   Procedure Laterality Date   • CERVICAL DISK AND FUSION ANTERIOR  2013    Performed by Fred Post M.D. at SURGERY [facility]   • CERVICAL DECOMPRESSION POSTERIOR  2013    Performed by Fred Post M.D. 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 All other systems reviewed and negative.        Objective   /82   Pulse 68   Temp 36.8 °C (98.3 °F)   Resp 16   Ht 1.727 m (5' 8\")   Wt 100.7 kg (222 lb)   SpO2 97%  Body mass index is 33.75 kg/m².    General: alert and oriented, pleasant, cooperative  HEENT: Normocephalic, atraumatic.   Psychiatric: appropriate mood and affect. Good insight and appropriate judgment     Assessment and Plan   The following treatment plan was discussed     1. Screening for colorectal cancer  - COLOGUARD (FIT DNA)    2. Pain of right lower extremity  Likely sciatica, advised PT but he declined for now. Advised easing up on yard work, may continue gabapentin. He will let me know if symptoms progress and he is amenable to PT. Discussed stopping " ibuprofen to see if the gabapentin suffices.   - gabapentin (NEURONTIN) 600 MG tablet; Take 1/2 tablet in the morning and full tablet at bedtime  Dispense: 135 Tab; Refill: 3    3. Type 2 diabetes mellitus without complication, without long-term current use of insulin (HCC)  Due for follow up with me and DM RN next month, will have labs done prior.   - HEMOGLOBIN A1C; Future    4. Low vitamin D level  Taking vitamin D 1000 units daily, completed prescription treatment last year and level was 15 when last checked 8/2018.   - VITAMIN D,25 HYDROXY; Future    Healthcare maintenance     Health Maintenance Due   Topic Date Due   • Annual Wellness Visit  1950   • HEPATITIS C SCREENING  1950   • IMM HEP B VACCINE (1 of 3 - Risk 3-dose series) 03/04/1967   • IMM DTaP/Tdap/Td Vaccine (1 - Tdap) 03/04/1967   • RETINAL SCREENING  05/03/2019   • IMM PNEUMOCOCCAL 65+ (ADULT) LOW/MEDIUM RISK SERIES (2 of 2 - PPSV23) 05/03/2019   • COLON CANCER SCREENING ANNUAL FIT  05/07/2019     Return in about 6 weeks (around 8/26/2019).    Baldomero Gandhi MD  Internal Medicine  [facility]

## 2019-07-22 DIAGNOSIS — E11.9 TYPE 2 DIABETES MELLITUS WITHOUT COMPLICATION, WITHOUT LONG-TERM CURRENT USE OF INSULIN (HCC): ICD-10-CM

## 2019-07-23 RX ORDER — GLIMEPIRIDE 4 MG/1
TABLET ORAL
Qty: 180 TAB | Refills: 0 | Status: SHIPPED | OUTPATIENT
Start: 2019-07-23 | End: 2019-10-19 | Stop reason: SDUPTHER

## 2019-07-23 NOTE — TELEPHONE ENCOUNTER
Has upcoming appt w/ PCP. Will send 3 months of fills to pharmacy.  
Was the patient seen in the last year in this department? Yes    Does patient have an active prescription for medications requested? No     Received Request Via: Pharmacy      Pt met protocol?: Yes   Pt last ov 7/19   Lab Results   Component Value Date/Time    HBA1C 8.9 (H) 04/15/2019 08:51 AM      Lab Results   Component Value Date/Time    SODIUM 140 04/15/2019 08:51 AM    POTASSIUM 3.9 04/15/2019 08:51 AM    CHLORIDE 105 04/15/2019 08:51 AM    CO2 25 04/15/2019 08:51 AM    GLUCOSE 234 (H) 04/15/2019 08:51 AM    BUN 22 04/15/2019 08:51 AM    CREATININE 0.81 04/15/2019 08:51 AM        
no

## 2019-08-08 ENCOUNTER — TELEPHONE (OUTPATIENT)
Dept: MEDICAL GROUP | Facility: PHYSICIAN GROUP | Age: 69
End: 2019-08-08

## 2019-08-08 NOTE — TELEPHONE ENCOUNTER
----- Message from Baldomero Gandhi M.D. sent at 8/8/2019  9:24 AM PDT -----  Please let him know that his cologuard was negative, repeat due in 3 years

## 2019-08-09 NOTE — TELEPHONE ENCOUNTER
2nd attempt:  Phone Number Called: [phone]    Call outcome: left message for patient to call back regarding message below    Message: To call back regarding results.

## 2019-08-25 DIAGNOSIS — I82.511 CHRONIC DEEP VEIN THROMBOSIS (DVT) OF FEMORAL VEIN OF RIGHT LOWER EXTREMITY (HCC): ICD-10-CM

## 2019-08-26 NOTE — TELEPHONE ENCOUNTER
Was the patient seen in the last year in this department? Yes    Does patient have an active prescription for medications requested? No     Received Request Via: Pharmacy      Pt met protocol?: Yes    LAST OV 07/18/2019

## 2019-08-27 RX ORDER — RIVAROXABAN 20 MG/1
20 TABLET, FILM COATED ORAL
Qty: 90 TAB | Refills: 1 | Status: SHIPPED | OUTPATIENT
Start: 2019-08-27 | End: 2020-03-03 | Stop reason: SDUPTHER

## 2019-09-13 RX ORDER — SIMVASTATIN 20 MG
TABLET ORAL
Qty: 90 TAB | Refills: 2 | Status: SHIPPED | OUTPATIENT
Start: 2019-09-13 | End: 2020-03-03 | Stop reason: SDUPTHER

## 2019-09-13 NOTE — TELEPHONE ENCOUNTER
Pt has had OV within the 12 month protocol and lipid panel is current. 9 month supply sent to pharmacy.   Lab Results   Component Value Date/Time    CHOLSTRLTOT 133 04/15/2019 08:51 AM    LDL 58 04/15/2019 08:51 AM    HDL 41 04/15/2019 08:51 AM    TRIGLYCERIDE 168 (H) 04/15/2019 08:51 AM       Lab Results   Component Value Date/Time    SODIUM 140 04/15/2019 08:51 AM    POTASSIUM 3.9 04/15/2019 08:51 AM    CHLORIDE 105 04/15/2019 08:51 AM    CO2 25 04/15/2019 08:51 AM    GLUCOSE 234 (H) 04/15/2019 08:51 AM    BUN 22 04/15/2019 08:51 AM    CREATININE 0.81 04/15/2019 08:51 AM     Lab Results   Component Value Date/Time    ALKPHOSPHAT 49 04/15/2019 08:51 AM    ASTSGOT 15 04/15/2019 08:51 AM    ALTSGPT 19 04/15/2019 08:51 AM    TBILIRUBIN 1.0 04/15/2019 08:51 AM

## 2019-09-13 NOTE — TELEPHONE ENCOUNTER
Was the patient seen in the last year in this department? Yes    Does patient have an active prescription for medications requested? No     Received Request Via: Pharmacy      Pt met protocol?: Yes    LAST OV 07/18/2019    Lab Results   Component Value Date/Time    CHOLSTRLTOT 133 04/15/2019 08:51 AM    LDL 58 04/15/2019 08:51 AM    HDL 41 04/15/2019 08:51 AM    TRIGLYCERIDE 168 (H) 04/15/2019 08:51 AM       Lab Results   Component Value Date/Time    SODIUM 140 04/15/2019 08:51 AM    POTASSIUM 3.9 04/15/2019 08:51 AM    CHLORIDE 105 04/15/2019 08:51 AM    CO2 25 04/15/2019 08:51 AM    GLUCOSE 234 (H) 04/15/2019 08:51 AM    BUN 22 04/15/2019 08:51 AM    CREATININE 0.81 04/15/2019 08:51 AM     Lab Results   Component Value Date/Time    ALKPHOSPHAT 49 04/15/2019 08:51 AM    ASTSGOT 15 04/15/2019 08:51 AM    ALTSGPT 19 04/15/2019 08:51 AM    TBILIRUBIN 1.0 04/15/2019 08:51 AM

## 2019-10-12 DIAGNOSIS — E03.8 OTHER SPECIFIED HYPOTHYROIDISM: ICD-10-CM

## 2019-10-12 DIAGNOSIS — E78.5 DYSLIPIDEMIA: ICD-10-CM

## 2019-10-15 RX ORDER — LEVOTHYROXINE SODIUM 0.1 MG/1
100 TABLET ORAL
Qty: 90 TAB | Refills: 0 | Status: SHIPPED | OUTPATIENT
Start: 2019-10-15 | End: 2020-01-07

## 2019-10-15 RX ORDER — LISINOPRIL 2.5 MG/1
TABLET ORAL
Qty: 90 TAB | Refills: 1 | Status: SHIPPED | OUTPATIENT
Start: 2019-10-15 | End: 2020-03-03 | Stop reason: SDUPTHER

## 2019-10-15 NOTE — TELEPHONE ENCOUNTER
/19Was the patient seen in the last year in this department? Yes    Does patient have an active prescription for medications requested? No     Received Request Via: Pharmacy      Pt met protocol?: NO    OV 7/19    TSH 8/18

## 2019-10-15 NOTE — TELEPHONE ENCOUNTER
Was the patient seen in the last year in this department? Yes    Does patient have an active prescription for medications requested? No     Received Request Via: Pharmacy      Pt met protocol?: Yes    OV 7/19    BP Readings from Last 1 Encounters:   07/18/19 140/82

## 2019-10-15 NOTE — TELEPHONE ENCOUNTER
Refill X 6 months, sent to pharmacy.Pt. Seen in the last 6 months per protocol.   Lab Results   Component Value Date/Time    SODIUM 140 04/15/2019 08:51 AM    POTASSIUM 3.9 04/15/2019 08:51 AM    CHLORIDE 105 04/15/2019 08:51 AM    CO2 25 04/15/2019 08:51 AM    GLUCOSE 234 (H) 04/15/2019 08:51 AM    BUN 22 04/15/2019 08:51 AM    CREATININE 0.81 04/15/2019 08:51 AM

## 2019-10-19 DIAGNOSIS — E11.9 TYPE 2 DIABETES MELLITUS WITHOUT COMPLICATION, WITHOUT LONG-TERM CURRENT USE OF INSULIN (HCC): ICD-10-CM

## 2019-10-21 RX ORDER — GLIMEPIRIDE 4 MG/1
TABLET ORAL
Qty: 180 TAB | Refills: 0 | Status: SHIPPED | OUTPATIENT
Start: 2019-10-21 | End: 2020-01-14

## 2020-01-07 RX ORDER — LEVOTHYROXINE SODIUM 0.1 MG/1
100 TABLET ORAL
Qty: 30 TAB | Refills: 0 | Status: SHIPPED | OUTPATIENT
Start: 2020-01-07 | End: 2020-01-30

## 2020-01-07 NOTE — TELEPHONE ENCOUNTER
Pt was told 10/19 to make appt to establish with new PCP and that has not been done. Please advise pt only 30 days will be sent to pharmacy and next request will be denied.

## 2020-01-07 NOTE — TELEPHONE ENCOUNTER
Was the patient seen in the last year in this department? Yes    Does patient have an active prescription for medications requested? No     Received Request Via: Pharmacy    Pt met protocol?: Yes     Last OV 07/18/19    TSH   Date Value Ref Range Status   08/07/2018 4.790 0.380 - 5.330 uIU/mL Final     Comment:     Please note new reference ranges effective 12/14/2017 10:00 AM  Pregnant Females, 1st Trimester  0.050-3.700  Pregnant Females, 2nd Trimester  0.310-4.350  Pregnant Females, 3rd Trimester  0.410-5.180

## 2020-01-13 DIAGNOSIS — E11.9 TYPE 2 DIABETES MELLITUS WITHOUT COMPLICATION, WITHOUT LONG-TERM CURRENT USE OF INSULIN (HCC): ICD-10-CM

## 2020-01-14 RX ORDER — GLIMEPIRIDE 4 MG/1
TABLET ORAL
Qty: 60 TAB | Refills: 0 | Status: SHIPPED | OUTPATIENT
Start: 2020-01-14 | End: 2020-02-10

## 2020-01-14 NOTE — TELEPHONE ENCOUNTER
Was the patient seen in the last year in this department? Yes    Does patient have an active prescription for medications requested? No     Received Request Via: Pharmacy      Pt met protocol?: Yes   Pt last ov 7/2019   Lab Results   Component Value Date/Time    HBA1C 8.9 (H) 04/15/2019 08:51 AM      Lab Results   Component Value Date/Time    SODIUM 140 04/15/2019 08:51 AM    POTASSIUM 3.9 04/15/2019 08:51 AM    CHLORIDE 105 04/15/2019 08:51 AM    CO2 25 04/15/2019 08:51 AM    GLUCOSE 234 (H) 04/15/2019 08:51 AM    BUN 22 04/15/2019 08:51 AM    CREATININE 0.81 04/15/2019 08:51 AM

## 2020-02-09 DIAGNOSIS — E11.9 TYPE 2 DIABETES MELLITUS WITHOUT COMPLICATION, WITHOUT LONG-TERM CURRENT USE OF INSULIN (HCC): ICD-10-CM

## 2020-02-10 RX ORDER — GLIMEPIRIDE 4 MG/1
TABLET ORAL
Qty: 60 TAB | Refills: 0 | Status: SHIPPED | OUTPATIENT
Start: 2020-02-10 | End: 2020-03-03 | Stop reason: SDUPTHER

## 2020-02-26 RX ORDER — PIOGLITAZONEHYDROCHLORIDE 15 MG/1
TABLET ORAL
Qty: 90 TAB | Refills: 3 | OUTPATIENT
Start: 2020-02-26

## 2020-03-03 ENCOUNTER — OFFICE VISIT (OUTPATIENT)
Dept: MEDICAL GROUP | Facility: PHYSICIAN GROUP | Age: 70
End: 2020-03-03
Payer: MEDICARE

## 2020-03-03 VITALS
WEIGHT: 244.8 LBS | HEIGHT: 68 IN | OXYGEN SATURATION: 96 % | SYSTOLIC BLOOD PRESSURE: 138 MMHG | TEMPERATURE: 97.8 F | DIASTOLIC BLOOD PRESSURE: 82 MMHG | RESPIRATION RATE: 16 BRPM | HEART RATE: 61 BPM | BODY MASS INDEX: 37.1 KG/M2

## 2020-03-03 DIAGNOSIS — M79.604 PAIN OF RIGHT LOWER EXTREMITY: ICD-10-CM

## 2020-03-03 DIAGNOSIS — E78.5 HYPERLIPIDEMIA, UNSPECIFIED HYPERLIPIDEMIA TYPE: ICD-10-CM

## 2020-03-03 DIAGNOSIS — Z12.5 SCREENING PSA (PROSTATE SPECIFIC ANTIGEN): ICD-10-CM

## 2020-03-03 DIAGNOSIS — I82.511 CHRONIC DEEP VEIN THROMBOSIS (DVT) OF FEMORAL VEIN OF RIGHT LOWER EXTREMITY (HCC): ICD-10-CM

## 2020-03-03 DIAGNOSIS — Z11.59 NEED FOR HEPATITIS C SCREENING TEST: ICD-10-CM

## 2020-03-03 DIAGNOSIS — E03.8 OTHER SPECIFIED HYPOTHYROIDISM: ICD-10-CM

## 2020-03-03 DIAGNOSIS — E55.9 AVITAMINOSIS D: ICD-10-CM

## 2020-03-03 DIAGNOSIS — E11.9 TYPE 2 DIABETES MELLITUS WITHOUT COMPLICATION, WITHOUT LONG-TERM CURRENT USE OF INSULIN (HCC): ICD-10-CM

## 2020-03-03 PROCEDURE — 99214 OFFICE O/P EST MOD 30 MIN: CPT | Performed by: NURSE PRACTITIONER

## 2020-03-03 RX ORDER — GABAPENTIN 600 MG/1
TABLET ORAL
Qty: 135 TAB | Refills: 1 | Status: SHIPPED | OUTPATIENT
Start: 2020-03-03 | End: 2020-09-02 | Stop reason: SDUPTHER

## 2020-03-03 RX ORDER — GLIMEPIRIDE 4 MG/1
TABLET ORAL
Qty: 60 TAB | Refills: 3 | Status: SHIPPED | OUTPATIENT
Start: 2020-03-03 | End: 2020-07-23

## 2020-03-03 RX ORDER — LISINOPRIL 2.5 MG/1
2.5 TABLET ORAL
Qty: 90 TAB | Refills: 1 | Status: SHIPPED | OUTPATIENT
Start: 2020-03-03 | End: 2020-10-19 | Stop reason: SDUPTHER

## 2020-03-03 RX ORDER — PIOGLITAZONEHYDROCHLORIDE 15 MG/1
15 TABLET ORAL DAILY
Qty: 30 TAB | Refills: 3 | Status: SHIPPED | OUTPATIENT
Start: 2020-03-03 | End: 2020-07-10

## 2020-03-03 RX ORDER — SIMVASTATIN 20 MG
20 TABLET ORAL
Qty: 90 TAB | Refills: 1 | Status: SHIPPED | OUTPATIENT
Start: 2020-03-03 | End: 2020-09-30 | Stop reason: SDUPTHER

## 2020-03-03 RX ORDER — LEVOTHYROXINE SODIUM 0.1 MG/1
100 TABLET ORAL
Qty: 90 TAB | Refills: 1 | Status: SHIPPED | OUTPATIENT
Start: 2020-03-03 | End: 2020-10-05 | Stop reason: SDUPTHER

## 2020-03-03 ASSESSMENT — PATIENT HEALTH QUESTIONNAIRE - PHQ9: CLINICAL INTERPRETATION OF PHQ2 SCORE: 0

## 2020-03-04 NOTE — PROGRESS NOTES
Chief Complaint   Patient presents with   • Medication Refill       HISTORY OF PRESENT ILLNESS: Patient is a 69 y.o. male established patient who presents today to discuss the following issues:    BMI 37.0-37.9, adult  Patient is aware of BMI elevation.  Brief discussion of diet, exercise, and lifestyle modification.        Type 2 diabetes mellitus without complication, without long-term current use of insulin (HCC)  Chronic in nature.  Stable on meds.  Due for labs and refills.      Deep vein thrombosis (HCC) [I82.409]  Stable on Xarelto.  Due for labs and refills.      Leg pain  Chronic in nature. Stable on gabapentin.  Would like refills?    Hyperlipidemia  Chronic in nature.  Stable on meds.  Due for labs and refills.      Hypothyroidism  Chronic in nature.  Stable on meds.  Due for labs and refills.        Patient Active Problem List    Diagnosis Date Noted   • Hyperlipidemia 03/05/2020   • Leg pain 07/18/2019   • Lipoma 08/13/2018   • Low vitamin D level 05/03/2018   • BMI 37.0-37.9, adult 05/03/2018   • Deep vein thrombosis (HCC) [I82.409] 12/09/2016   • Type 2 diabetes mellitus without complication, without long-term current use of insulin (HCC) 01/12/2013   • Hypothyroidism 01/12/2013   • Dyslipidemia 01/12/2013   • Spinal stenosis in cervical region 12/26/2012   • Cervical stenosis of spine 12/17/2012       Allergies:Nkda [no known drug allergy]    Current Outpatient Medications   Medication Sig Dispense Refill   • glimepiride (AMARYL) 4 MG Tab TAKE 1 TABLET BY MOUTH TWICE A DAY 60 Tab 3   • levothyroxine (SYNTHROID) 100 MCG Tab Take 1 Tab by mouth Every morning on an empty stomach. 90 Tab 1   • metformin (GLUCOPHAGE) 1000 MG tablet TAKE 1 TABLET BY MOUTH TWICE A DAY WITH MEALS 180 Tab 1   • lisinopril (PRINIVIL) 2.5 MG Tab Take 1 Tab by mouth every day. 90 Tab 1   • simvastatin (ZOCOR) 20 MG Tab Take 1 Tab by mouth every day. N THE EVENING 90 Tab 1   • rivaroxaban (XARELTO) 20 MG Tab tablet Take 1 Tab by  mouth with dinner. 30 Tab 3   • pioglitazone (ACTOS) 15 MG Tab Take 1 Tab by mouth every day. 30 Tab 3   • gabapentin (NEURONTIN) 600 MG tablet Take 1/2 tablet in the morning and full tablet at bedtime 135 Tab 1   • ibuprofen (MOTRIN) 800 MG Tab Take 800 mg by mouth every 8 hours as needed.     • Blood Glucose Test Strips Test strips order: Test strips for One Touch Ultra 2 meter. Sig: use BID and prn ssx high or low sugar. 200 Each 3   • vitamin D, Ergocalciferol, (DRISDOL) 90377 units Cap capsule Take 1 Cap by mouth every 7 days. 12 Cap 0   • albuterol 108 (90 BASE) MCG/ACT Aero Soln inhalation aerosol Inhale 2 Puffs by mouth every 6 hours as needed for Shortness of Breath. 8.5 g 3   • Lancets Misc Lancets order: Lancets for One Touch Ultra 2 meter. Sig: use BID and prn ssx high or low sugar. 100 Each 3     No current facility-administered medications for this visit.        Social History     Tobacco Use   • Smoking status: Former Smoker     Years: 40.00     Last attempt to quit: 2007     Years since quittin.1   • Smokeless tobacco: Never Used   • Tobacco comment: off and on smoker   Substance Use Topics   • Alcohol use: No     Alcohol/week: 0.0 oz   • Drug use: No       Family Status   Relation Name Status   • Mo     • Fa       Family History   Problem Relation Age of Onset   • Stroke Mother    • Cancer Father         brain        Review of Systems:   Constitutional: Negative for fever, chills, weight loss and malaise/fatigue.   HENT: Negative for ear pain, nosebleeds, congestion, sore throat and neck pain.    Eyes: Negative for blurred vision.   Respiratory: Negative for cough, sputum production, shortness of breath and wheezing.    Cardiovascular: Negative for chest pain, palpitations, orthopnea and leg swelling.   Gastrointestinal: Negative for heartburn, nausea, vomiting and abdominal pain.   Genitourinary: Negative for dysuria, urgency and frequency.   Musculoskeletal: Negative for  "myalgias, joint pain, and back pain. Positive for stable leg pain.  Skin: Negative for rash and itching.   Neurological: Negative for dizziness, tingling, tremors, sensory change, focal weakness and headaches.   Endo/Heme/Allergies: Does not bruise/bleed easily.   Psychiatric/Behavioral: Negative for depression, suicidal ideas and memory loss.  The patient is not nervous/anxious and does not have insomnia.    All other systems reviewed and are negative except as in HPI.    Exam:  Blood Pressure 138/82 (BP Location: Left arm, Patient Position: Sitting, BP Cuff Size: Adult)   Pulse 61   Temperature 36.6 °C (97.8 °F) (Temporal)   Respiration 16   Height 1.727 m (5' 8\")   Weight 111 kg (244 lb 12.8 oz)   Oxygen Saturation 96%   General:  Well nourished, well developed male in NAD  Head: Grossly normal.  Neck: Supple without JVD or bruit. Thyroid is not enlarged.  Pulmonary: Clear to ausculation. Normal effort. No rales, ronchi, or wheezing.  Cardiovascular: Regular rate and rhythm without murmur.   Abdomen:  Soft, nontender, nondistended.  Extremities: No clubbing, cyanosis, or edema.  Skin: Intact with no obvious rashes or lesions.  Neuro: Grossly intact.  Psych: Alert and oriented x 3.  Mood and affect appropriate.    Medical decision-making and discussion: Ravindra is here today for follow-up.  His chart was reviewed, lab work was ordered, and his medication was refilled.  He will keep his appointment to establish care with Dr. Alonzo, and he will follow-up here as needed.          Assessment/Plan:  1. BMI 37.0-37.9, adult  Patient identified as having weight management issue.  Appropriate orders and counseling given.   2. Type 2 diabetes mellitus without complication, without long-term current use of insulin (HCC)  glimepiride (AMARYL) 4 MG Tab    metformin (GLUCOPHAGE) 1000 MG tablet    lisinopril (PRINIVIL) 2.5 MG Tab    pioglitazone (ACTOS) 15 MG Tab    Comp Metabolic Panel    HEMOGLOBIN A1C   3. Chronic deep " vein thrombosis (DVT) of femoral vein of right lower extremity (HCC)  rivaroxaban (XARELTO) 20 MG Tab tablet   4. Pain of right lower extremity  gabapentin (NEURONTIN) 600 MG tablet   5. Hyperlipidemia, unspecified hyperlipidemia type  simvastatin (ZOCOR) 20 MG Tab    Lipid Profile   6. Other specified hypothyroidism  levothyroxine (SYNTHROID) 100 MCG Tab    TSH   7. Need for hepatitis C screening test  HEP C VIRUS ANTIBODY   8. Screening PSA (prostate specific antigen)  PROSTATE SPECIFIC AG SCREENING   9. Avitaminosis D  VITAMIN D,25 HYDROXY       Return if symptoms worsen or fail to improve.    Please note that this dictation was created using voice recognition software. I have made every reasonable attempt to correct obvious errors, but I expect that there are errors of grammar and possibly content that I did not discover before finalizing the note.

## 2020-03-05 PROBLEM — E78.5 HYPERLIPIDEMIA: Status: ACTIVE | Noted: 2020-03-05

## 2020-03-06 NOTE — ASSESSMENT & PLAN NOTE
Patient is aware of BMI elevation.  Brief discussion of diet, exercise, and lifestyle modification.

## 2020-04-20 DIAGNOSIS — E11.9 DIABETES MELLITUS WITHOUT COMPLICATION (HCC): ICD-10-CM

## 2020-04-21 ENCOUNTER — OFFICE VISIT (OUTPATIENT)
Dept: URGENT CARE | Facility: PHYSICIAN GROUP | Age: 70
End: 2020-04-21
Payer: MEDICARE

## 2020-04-21 ENCOUNTER — HOSPITAL ENCOUNTER (OUTPATIENT)
Dept: RADIOLOGY | Facility: MEDICAL CENTER | Age: 70
End: 2020-04-21
Attending: NURSE PRACTITIONER
Payer: MEDICARE

## 2020-04-21 VITALS
BODY MASS INDEX: 36.98 KG/M2 | HEART RATE: 62 BPM | DIASTOLIC BLOOD PRESSURE: 78 MMHG | HEIGHT: 68 IN | WEIGHT: 244 LBS | OXYGEN SATURATION: 97 % | TEMPERATURE: 97.1 F | SYSTOLIC BLOOD PRESSURE: 112 MMHG | RESPIRATION RATE: 18 BRPM

## 2020-04-21 DIAGNOSIS — M54.41 ACUTE RIGHT-SIDED LOW BACK PAIN WITH RIGHT-SIDED SCIATICA: ICD-10-CM

## 2020-04-21 DIAGNOSIS — S39.92XA BACK INJURY, INITIAL ENCOUNTER: ICD-10-CM

## 2020-04-21 PROCEDURE — 99214 OFFICE O/P EST MOD 30 MIN: CPT | Performed by: NURSE PRACTITIONER

## 2020-04-21 PROCEDURE — 72100 X-RAY EXAM L-S SPINE 2/3 VWS: CPT

## 2020-04-21 RX ORDER — CYCLOBENZAPRINE HCL 5 MG
5 TABLET ORAL 2 TIMES DAILY PRN
Qty: 30 TAB | Refills: 0 | Status: ON HOLD | OUTPATIENT
Start: 2020-04-21 | End: 2021-11-18

## 2020-04-21 ASSESSMENT — ENCOUNTER SYMPTOMS
FEVER: 0
ARTHRALGIAS: 0
HEADACHES: 0
DIZZINESS: 0
BACK PAIN: 1
CHILLS: 0
HIP PAIN: 1
VOMITING: 0
FALLS: 1

## 2020-04-21 NOTE — PROGRESS NOTES
Subjective:   Ravindra Palma  is a 70 y.o. male who presents for Hip Pain (R side buttocks pain radiating down leg, sore hip, burning, barely able to walk, x2weeks, pt states he fell a few feet on L side)        Hip Pain   This is a new problem. The current episode started 1 to 4 weeks ago. The problem occurs constantly. The problem has been gradually worsening. Pertinent negatives include no arthralgias, chills, congestion, fever, headaches, rash, urinary symptoms or vomiting. Associated symptoms comments: 70-year-old male reports to urgent care for new problem.  States that he fell from about 3 feet or couch height down to the floor on his left hand side about 2 weeks ago.  Patient states that he is had increasing right sided low back pain with right hip pain that radiates down to his right knee.  Patient states it comes and goes but is worse today.  Denies any numbness or tingling denies any bowel or bladder issues.  Patient states that his pain is about a 5 out of 10 but did take a pain medication this morning he is unsure what it was.  Took 3 ibuprofen with no relief last night.. The symptoms are aggravated by bending, twisting, walking and standing. He has tried NSAIDs, rest and oral narcotics for the symptoms. The treatment provided mild relief.     Review of Systems   Constitutional: Negative for chills, fever and malaise/fatigue.   HENT: Negative for congestion.    Gastrointestinal: Negative for vomiting.   Musculoskeletal: Positive for back pain and falls. Negative for arthralgias.   Skin: Negative for rash.   Neurological: Negative for dizziness and headaches.     Past Medical History:   Diagnosis Date   • Deep vein thrombosis (HCC) [I82.409] 12/9/2016   • Dyslipidemia 1/12/2013   • Former smoker 12/7/2016   • High cholesterol    • Hypothyroidism 1/12/2013   • Low vitamin D level 5/3/2018   • Obesity (BMI 30-39.9) 5/3/2018   • Type II or unspecified type diabetes mellitus without mention of  complication, uncontrolled 2013      Past Surgical History:   Procedure Laterality Date   • CERVICAL DISK AND FUSION ANTERIOR  2013    Performed by Fred Post M.D. at SURGERY [facility]   • CERVICAL DECOMPRESSION POSTERIOR  2013    Performed by Fred Post M.D. at SURGERY [facility]      Social History     Socioeconomic History   • Marital status:      Spouse name: Not on file   • Number of children: Not on file   • Years of education: Not on file   • Highest education level: Not on file   Occupational History   • Not on file   Social Needs   • Financial resource strain: Not on file   • Food insecurity     Worry: Not on file     Inability: Not on file   • Transportation needs     Medical: Not on file     Non-medical: Not on file   Tobacco Use   • Smoking status: Former Smoker     Years: 40.00     Last attempt to quit: 2007     Years since quittin.3   • Smokeless tobacco: Never Used   • Tobacco comment: off and on smoker   Substance and Sexual Activity   • Alcohol use: No     Alcohol/week: 0.0 oz   • Drug use: No   • Sexual activity: Yes     Partners: Female   Lifestyle   • Physical activity     Days per week: Not on file     Minutes per session: Not on file   • Stress: Not on file   Relationships   • Social connections     Talks on phone: Not on file     Gets together: Not on file     Attends [employer] service: Not on file     Active member of club or organization: Not on file     Attends meetings of clubs or organizations: Not on file     Relationship status: Not on file   • Intimate partner violence     Fear of current or ex partner: Not on file     Emotionally abused: Not on file     Physically abused: Not on file     Forced sexual activity: Not on file   Other Topics Concern   • Not on file   Social History Narrative    Retired; 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   Constitutional:       Appearance: Normal appearance.   Cardiovascular:      Rate and Rhythm: Normal rate and regular rhythm.      Heart sounds: Normal heart sounds.   Pulmonary:      Effort: Pulmonary effort is normal.      Breath sounds: Normal breath sounds.   Musculoskeletal:      Lumbar back: He exhibits decreased range of motion, tenderness, pain and spasm. He exhibits no bony tenderness, no swelling, no edema, no deformity, no laceration and normal pulse.        Back:       Comments: No pain with plantar or dorsiflexion   Skin:     General: Skin is warm.   Neurological:      Mental Status: He is alert and oriented to person, place, and time.   Psychiatric:         Mood and Affect: Mood normal.         Behavior: Behavior normal.         Thought Content: Thought content normal.         Judgment: Judgment normal.           Assessment/Plan:     1. Back injury, initial encounter  - DX-LUMBAR SPINE-2 OR 3 VIEWS;      Diffuse osseous demineralization, limiting evaluation for nondisplaced fracture.     Evaluation of the sacrum is limited due to overlying bowel content.     No definite fracture. No definite compression deformity.     Mild anterolisthesis of L4-5.     Moderate degenerative change of the lumbar spine.     Vascular calcification.     IMPRESSION:        No definite fracture is seen but evaluation is very limited due to osseous demineralization.     Moderate degenerative change of the lumbar spine.     Mild anterolisthesis of L4-5.  Reviewed images, agree with radiology, discussed with patient    - REFERRAL TO PHYSIATRY (PMR)    2. Acute right-sided low back pain with right-sided sciatica  - DX-LUMBAR SPINE-2 OR 3 VIEWS; Future  - REFERRAL TO PHYSIATRY (PMR)  - cyclobenzaprine " (FLEXERIL) 5 MG tablet; Take 1 Tab by mouth 2 times a day as needed for Moderate Pain for up to 30 doses.  Dispense: 30 Tab; Refill: 0    Discussed physical examination findings, mechanism of injury as well as patient presentation and length of patient symptoms could be due to just inflammatory responses after fall however was able to obtain an x-ray today and although it looks benign.  Patient is exhibiting some anterior listhesis of L4 and 5 and does have some osseous demineralization so I want to send him a referral to physiatry for further work-up and evaluation.  Will provide patient with a muscle relaxant to help with right-sided sciatic pain and also instructed him on supportive care measures including rest, ice as well as gentle stretches with over-the-counter NSAIDs and Tylenol per 's instructions.    Supportive care, differential diagnoses, and indications for immediate follow-up discussed with patient.    Pathogenesis of diagnosis discussed including typical length and natural progression. Patient expresses understanding and agrees to plan.    Instructed patient to return to clinic for worsening symptoms or symptoms that persist for 7 to 10 days     Please note that this dictation was created using voice recognition software. I have made every reasonable attempt to correct obvious errors, but I expect that there are errors of grammar and possibly content that I did not discover before finalizing the note.

## 2020-04-22 RX ORDER — LANCETS 30 GAUGE
EACH MISCELLANEOUS
Qty: 200 EACH | Refills: 3 | Status: SHIPPED | OUTPATIENT
Start: 2020-04-22 | End: 2022-10-25

## 2020-05-26 ENCOUNTER — HOSPITAL ENCOUNTER (OUTPATIENT)
Dept: LAB | Facility: MEDICAL CENTER | Age: 70
End: 2020-05-26
Attending: NURSE PRACTITIONER
Payer: MEDICARE

## 2020-05-26 DIAGNOSIS — Z11.59 NEED FOR HEPATITIS C SCREENING TEST: ICD-10-CM

## 2020-05-26 DIAGNOSIS — Z12.5 SCREENING PSA (PROSTATE SPECIFIC ANTIGEN): ICD-10-CM

## 2020-05-26 DIAGNOSIS — E11.9 TYPE 2 DIABETES MELLITUS WITHOUT COMPLICATION, WITHOUT LONG-TERM CURRENT USE OF INSULIN (HCC): ICD-10-CM

## 2020-05-26 DIAGNOSIS — E03.8 OTHER SPECIFIED HYPOTHYROIDISM: ICD-10-CM

## 2020-05-26 DIAGNOSIS — E55.9 AVITAMINOSIS D: ICD-10-CM

## 2020-05-26 DIAGNOSIS — E78.5 HYPERLIPIDEMIA, UNSPECIFIED HYPERLIPIDEMIA TYPE: ICD-10-CM

## 2020-05-26 LAB
25(OH)D3 SERPL-MCNC: 19 NG/ML (ref 30–100)
ALBUMIN SERPL BCP-MCNC: 4.4 G/DL (ref 3.2–4.9)
ALBUMIN/GLOB SERPL: 1.8 G/DL
ALP SERPL-CCNC: 55 U/L (ref 30–99)
ALT SERPL-CCNC: 21 U/L (ref 2–50)
ANION GAP SERPL CALC-SCNC: 11 MMOL/L (ref 7–16)
AST SERPL-CCNC: 25 U/L (ref 12–45)
BILIRUB SERPL-MCNC: 0.8 MG/DL (ref 0.1–1.5)
BUN SERPL-MCNC: 16 MG/DL (ref 8–22)
CALCIUM SERPL-MCNC: 9.4 MG/DL (ref 8.5–10.5)
CHLORIDE SERPL-SCNC: 102 MMOL/L (ref 96–112)
CHOLEST SERPL-MCNC: 147 MG/DL (ref 100–199)
CO2 SERPL-SCNC: 25 MMOL/L (ref 20–33)
CREAT SERPL-MCNC: 0.82 MG/DL (ref 0.5–1.4)
EST. AVERAGE GLUCOSE BLD GHB EST-MCNC: 160 MG/DL
GLOBULIN SER CALC-MCNC: 2.5 G/DL (ref 1.9–3.5)
GLUCOSE SERPL-MCNC: 200 MG/DL (ref 65–99)
HBA1C MFR BLD: 7.2 % (ref 0–5.6)
HCV AB SER QL: NORMAL
HDLC SERPL-MCNC: 50 MG/DL
LDLC SERPL CALC-MCNC: 65 MG/DL
POTASSIUM SERPL-SCNC: 4.2 MMOL/L (ref 3.6–5.5)
PROT SERPL-MCNC: 6.9 G/DL (ref 6–8.2)
PSA SERPL-MCNC: 1.49 NG/ML (ref 0–4)
SODIUM SERPL-SCNC: 138 MMOL/L (ref 135–145)
TRIGL SERPL-MCNC: 162 MG/DL (ref 0–149)
TSH SERPL DL<=0.005 MIU/L-ACNC: 3.95 UIU/ML (ref 0.38–5.33)

## 2020-05-26 PROCEDURE — 80061 LIPID PANEL: CPT

## 2020-05-26 PROCEDURE — 82306 VITAMIN D 25 HYDROXY: CPT

## 2020-05-26 PROCEDURE — 80053 COMPREHEN METABOLIC PANEL: CPT

## 2020-05-26 PROCEDURE — 83036 HEMOGLOBIN GLYCOSYLATED A1C: CPT | Mod: GA

## 2020-05-26 PROCEDURE — 84443 ASSAY THYROID STIM HORMONE: CPT

## 2020-05-26 PROCEDURE — 86803 HEPATITIS C AB TEST: CPT

## 2020-05-26 PROCEDURE — 84153 ASSAY OF PSA TOTAL: CPT | Mod: GA

## 2020-05-26 PROCEDURE — 36415 COLL VENOUS BLD VENIPUNCTURE: CPT | Mod: GA

## 2020-06-09 ENCOUNTER — OFFICE VISIT (OUTPATIENT)
Dept: MEDICAL GROUP | Facility: PHYSICIAN GROUP | Age: 70
End: 2020-06-09
Payer: MEDICARE

## 2020-06-09 VITALS
HEIGHT: 67 IN | TEMPERATURE: 98.7 F | RESPIRATION RATE: 16 BRPM | DIASTOLIC BLOOD PRESSURE: 64 MMHG | BODY MASS INDEX: 37.04 KG/M2 | SYSTOLIC BLOOD PRESSURE: 130 MMHG | HEART RATE: 56 BPM | WEIGHT: 236 LBS | OXYGEN SATURATION: 97 %

## 2020-06-09 DIAGNOSIS — R79.89 LOW VITAMIN D LEVEL: ICD-10-CM

## 2020-06-09 DIAGNOSIS — E11.9 TYPE 2 DIABETES MELLITUS WITHOUT COMPLICATION, WITHOUT LONG-TERM CURRENT USE OF INSULIN (HCC): Chronic | ICD-10-CM

## 2020-06-09 DIAGNOSIS — I10 ESSENTIAL HYPERTENSION: ICD-10-CM

## 2020-06-09 DIAGNOSIS — I82.511 CHRONIC DEEP VEIN THROMBOSIS (DVT) OF FEMORAL VEIN OF RIGHT LOWER EXTREMITY (HCC): Chronic | ICD-10-CM

## 2020-06-09 DIAGNOSIS — Z12.11 COLON CANCER SCREENING: ICD-10-CM

## 2020-06-09 DIAGNOSIS — E55.9 VITAMIN D DEFICIENCY: ICD-10-CM

## 2020-06-09 DIAGNOSIS — Z23 NEED FOR VACCINATION: ICD-10-CM

## 2020-06-09 DIAGNOSIS — E78.5 HYPERLIPIDEMIA, UNSPECIFIED HYPERLIPIDEMIA TYPE: Chronic | ICD-10-CM

## 2020-06-09 DIAGNOSIS — E11.42 DIABETIC POLYNEUROPATHY ASSOCIATED WITH TYPE 2 DIABETES MELLITUS (HCC): ICD-10-CM

## 2020-06-09 DIAGNOSIS — E03.9 HYPOTHYROIDISM, UNSPECIFIED TYPE: Chronic | ICD-10-CM

## 2020-06-09 PROBLEM — E11.40 DIABETIC NEUROPATHY ASSOCIATED WITH TYPE 2 DIABETES MELLITUS (HCC): Status: ACTIVE | Noted: 2020-06-09

## 2020-06-09 PROBLEM — E11.40 DIABETIC NEUROPATHY ASSOCIATED WITH TYPE 2 DIABETES MELLITUS (HCC): Chronic | Status: ACTIVE | Noted: 2020-06-09

## 2020-06-09 PROCEDURE — 99214 OFFICE O/P EST MOD 30 MIN: CPT | Mod: 25 | Performed by: INTERNAL MEDICINE

## 2020-06-09 PROCEDURE — G0009 ADMIN PNEUMOCOCCAL VACCINE: HCPCS | Performed by: INTERNAL MEDICINE

## 2020-06-09 PROCEDURE — 90732 PPSV23 VACC 2 YRS+ SUBQ/IM: CPT | Performed by: INTERNAL MEDICINE

## 2020-06-09 RX ORDER — ERGOCALCIFEROL 1.25 MG/1
50000 CAPSULE ORAL
Qty: 15 CAP | Refills: 3 | Status: SHIPPED | OUTPATIENT
Start: 2020-06-09 | End: 2021-08-05

## 2020-06-09 RX ORDER — ERGOCALCIFEROL 1.25 MG/1
50000 CAPSULE ORAL
Qty: 12 CAP | Refills: 0 | Status: SHIPPED | OUTPATIENT
Start: 2020-06-09 | End: 2020-06-09

## 2020-06-09 NOTE — PROGRESS NOTES
CC: Establish care  Follow-up diabetes    HPI: This is a 70 y.o. Pt presents to [facility].   Pt's medical history is notable for:    Hypothyroidism  Chronic condition but the patient is currently taking levothyroxine.    Deep vein thrombosis (HCC) [I82.409]   chronic condition and stable.  The patient is presently taking Xarelto.  No history of bleeding noted.    Hyperlipidemia  Chronic condition.  The patient is now taking simvastatin.  No side effect reported.    Hypertension  This is a chronic condition.  The patient is taking lisinopril    Vitamin D deficiency  Patient is now taking vitamin D 83164 units once a week    Type 2 diabetes mellitus without complication, without long-term current use of insulin (HCC)  This is a chronic condition for the patient currently taking Actos metformin and glimepiride  The most recent A1c has improved and came down 7.2%.  It was 8.9% in April 2019    Diabetic neuropathy associated with type 2 diabetes mellitus (HCC)  Chronic condition: pt c.o chronic numbness/tingling sensation both feet.  Stable  Pt takes gabapentin daily.  No side effects noted.              REVIEW OF SYSTEMS:     Constitutional:  no fever / chills   Neurologic: no headaches  Eyes: no changes in vision  ENT: no sore throat, no hearing loss  CV:  no chest pain, no palpitations  Pulmonary: no SOB, no cough    GI: no nausea / vomiting, no diarrhea, no constipation  :  no dysuria, no hematuria   Skin: no rash   Hematologic: no bleeding      Allergies: Nkda [no known drug allergy]    Current Outpatient Medications Ordered in Epic   Medication Sig Dispense Refill   • vitamin D, Ergocalciferol, (DRISDOL) 1.25 MG (16983 UT) Cap capsule Take 1 Cap by mouth every 7 days. 15 Cap 3   • Lancets Lancets order: Lancets for One Touch Ultra 2 meter. Sig: use BID and prn ssx high or low sugar. 200 Each 3   • Blood Glucose Test Strips Test strips order: Test strips for One Touch Ultra 2 meter. Sig: use BID and prn ssx  high or low sugar. 200 Each 3   • cyclobenzaprine (FLEXERIL) 5 MG tablet Take 1 Tab by mouth 2 times a day as needed for Moderate Pain for up to 30 doses. 30 Tab 0   • glimepiride (AMARYL) 4 MG Tab TAKE 1 TABLET BY MOUTH TWICE A DAY 60 Tab 3   • levothyroxine (SYNTHROID) 100 MCG Tab Take 1 Tab by mouth Every morning on an empty stomach. 90 Tab 1   • metformin (GLUCOPHAGE) 1000 MG tablet TAKE 1 TABLET BY MOUTH TWICE A DAY WITH MEALS 180 Tab 1   • lisinopril (PRINIVIL) 2.5 MG Tab Take 1 Tab by mouth every day. 90 Tab 1   • simvastatin (ZOCOR) 20 MG Tab Take 1 Tab by mouth every day. N THE EVENING 90 Tab 1   • rivaroxaban (XARELTO) 20 MG Tab tablet Take 1 Tab by mouth with dinner. 30 Tab 3   • pioglitazone (ACTOS) 15 MG Tab Take 1 Tab by mouth every day. 30 Tab 3   • gabapentin (NEURONTIN) 600 MG tablet Take 1/2 tablet in the morning and full tablet at bedtime 135 Tab 1   • ibuprofen (MOTRIN) 800 MG Tab Take 800 mg by mouth every 8 hours as needed.     • albuterol 108 (90 BASE) MCG/ACT Aero Soln inhalation aerosol Inhale 2 Puffs by mouth every 6 hours as needed for Shortness of Breath. 8.5 g 3     No current [facility]-ordered facility-administered medications on file.        Past Medical History:   Diagnosis Date   • Deep vein thrombosis (HCC) [I82.409] 12/9/2016   • Dyslipidemia 1/12/2013   • Former smoker 12/7/2016   • High cholesterol    • Hypothyroidism 1/12/2013   • Low vitamin D level 5/3/2018   • Obesity (BMI 30-39.9) 5/3/2018   • Type II or unspecified type diabetes mellitus without mention of complication, uncontrolled 1/12/2013        Past Surgical History:   Procedure Laterality Date   • CERVICAL DISK AND FUSION ANTERIOR  1/11/2013    Performed by Fred Post M.D. at SURGERY [facility]   • CERVICAL DECOMPRESSION POSTERIOR  1/11/2013    Performed by Fred Post M.D. at SURGERY [facility]        Family History   Problem Relation Age of Onset   • Stroke Mother    • Cancer Father         brain          Social History     Tobacco Use   Smoking Status Former Smoker   • Years: 40.00   • Last attempt to quit: 2007   • Years since quittin.4   Smokeless Tobacco Never Used   Tobacco Comment    off and on smoker          Social History     Substance and Sexual Activity   Alcohol Use No   • Alcohol/week: 0.0 oz        ---------------------------------------------------------------------    PHYSICAL EXAM:   Vitals:    20 1049   BP: 130/64   Pulse: (!) 56   Resp: 16   Temp: 37.1 °C (98.7 °F)   SpO2: 97%     Body mass index is 37.08 kg/m².     General: no acute distress.   Eyes: EOMI, PERRL  HENT: NC/AT. OP no exudates  Neck: no bruits, no JVD.   CV:  RRR  Resp: normal effort, no wheezing or crackles.  Abdomen: Soft, NT, no obvious mass  Skin: no obvious rashes  Neuro: CN 2-12 grossly intact   Feet: Skin integrity intact.  Peripheral pulses reduced at DP and PT. Mild sensory deficit noted with monofilament testing.            ---------------------------------------------------------------------    ASSESSMENT and PLAN:   1. Essential hypertension  Chronic condition.  Blood pressure remained stable.  Continue with lisinopril.    2. Type 2 diabetes mellitus without complication, without long-term current use of insulin (HCC)  Chronic condition.  Recent A1c improved now down to 7.2%.  Advised the patient continue with current management.  Advised lifestyle modification.  Stressed the importance of low sweet /low carb diet, high lean protein, and avoid unhealthy foods.  Encourage pt to start/continue with regular aerobic exercises/walking.   - HEMOGLOBIN A1C; Future  - Basic Metabolic Panel; Future  - Diabetic Monofilament LE Exam  - Microalbumin Creat Ratio Urine (Lab Collect); Future    3. Hypothyroidism, unspecified type  Chronic stable condition.  Continue with levothyroxine.  - TSH; Future    4. Chronic deep vein thrombosis (DVT) of femoral vein of right lower extremity (HCC)  This is a chronic and stable  condition.  Continue with Xarelto.  No side effect reported.  Patient denies any history of bleeding.  - CBC WITH DIFFERENTIAL; Future    5. Hyperlipidemia, unspecified hyperlipidemia type  Chronic stable condition.  Continue with simvastatin.  - ALANINE AMINO-TRANS; Future  - Lipid Profile; Future    6. Vitamin D deficiency  Chronic stable condition.  Advised the patient to take vitamin D 50,000 unit once a week as the patient is not interested in taking daily vitamin D supplementation.  - VITAMIN 1,25 DIHYDROXY; Future    7. Need for vaccination    - Pneumovax Vaccine (PPSV23)    8. Diabetic polyneuropathy associated with type 2 diabetes mellitus (HCC)  Chronic stable condition.  Advised the patient to continue with gabapentin  And medication for diabetes.  Offered to refer the patient to podiatry the patient declined at this time.  We will continue to monitor.        Return in 4 months (on 10/9/2020) for diabetes followup.     PATIENT EDUCATION:  -If any problems should arise, patient was advised to contact our office or go to ER to be evaluated.  -Advised pt to follow a healthy diet and regular aerobic exercise regimen. Advised pt to avoid alcohol and tobacco use.    Please note that this dictation was created using voice recognition software. I have made every reasonable attempt to correct obvious errors, but it is possible there are errors of grammar and possibly content that I did not discover before finalizing the note.

## 2020-06-09 NOTE — ASSESSMENT & PLAN NOTE
Chronic condition: pt c.o chronic numbness/tingling sensation both feet.  Stable  Pt takes gabapentin daily.  No side effects noted.

## 2020-06-09 NOTE — ASSESSMENT & PLAN NOTE
chronic condition and stable.  The patient is presently taking Xarelto.  No history of bleeding noted.

## 2020-07-07 DIAGNOSIS — E11.9 TYPE 2 DIABETES MELLITUS WITHOUT COMPLICATION, WITHOUT LONG-TERM CURRENT USE OF INSULIN (HCC): ICD-10-CM

## 2020-07-10 RX ORDER — PIOGLITAZONEHYDROCHLORIDE 15 MG/1
TABLET ORAL
Qty: 90 TAB | Refills: 0 | Status: SHIPPED | OUTPATIENT
Start: 2020-07-10 | End: 2020-09-24

## 2020-07-22 DIAGNOSIS — E11.9 TYPE 2 DIABETES MELLITUS WITHOUT COMPLICATION, WITHOUT LONG-TERM CURRENT USE OF INSULIN (HCC): ICD-10-CM

## 2020-07-23 RX ORDER — GLIMEPIRIDE 4 MG/1
TABLET ORAL
Qty: 180 TAB | Refills: 1 | Status: SHIPPED | OUTPATIENT
Start: 2020-07-23 | End: 2021-01-15

## 2020-09-02 DIAGNOSIS — I82.511 CHRONIC DEEP VEIN THROMBOSIS (DVT) OF FEMORAL VEIN OF RIGHT LOWER EXTREMITY (HCC): ICD-10-CM

## 2020-09-02 DIAGNOSIS — M79.604 PAIN OF RIGHT LOWER EXTREMITY: ICD-10-CM

## 2020-09-03 RX ORDER — GABAPENTIN 600 MG/1
TABLET ORAL
Qty: 135 TAB | Refills: 0 | Status: SHIPPED | OUTPATIENT
Start: 2020-09-03 | End: 2020-11-13

## 2020-09-15 DIAGNOSIS — E11.9 TYPE 2 DIABETES MELLITUS WITHOUT COMPLICATION, WITHOUT LONG-TERM CURRENT USE OF INSULIN (HCC): ICD-10-CM

## 2020-09-16 NOTE — TELEPHONE ENCOUNTER
Last seen by PCP 06/9/2020.     Lab Results   Component Value Date/Time    HBA1C 7.2 (H) 05/26/2020 09:06 AM      Lab Results   Component Value Date/Time    MALBCRT 15 04/15/2019 08:51 AM    MICROALBUR 3.0 04/15/2019 08:51 AM      Lab Results   Component Value Date/Time    ALKPHOSPHAT 55 05/26/2020 09:06 AM    ASTSGOT 25 05/26/2020 09:06 AM    ALTSGPT 21 05/26/2020 09:06 AM    TBILIRUBIN 0.8 05/26/2020 09:06 AM      New pcp is Dr. Alonzo.

## 2020-09-22 DIAGNOSIS — E11.9 TYPE 2 DIABETES MELLITUS WITHOUT COMPLICATION, WITHOUT LONG-TERM CURRENT USE OF INSULIN (HCC): ICD-10-CM

## 2020-09-24 RX ORDER — PIOGLITAZONEHYDROCHLORIDE 15 MG/1
TABLET ORAL
Qty: 90 TAB | Refills: 1 | Status: SHIPPED | OUTPATIENT
Start: 2020-09-24 | End: 2021-02-16

## 2020-09-24 NOTE — TELEPHONE ENCOUNTER
Patient has recently been seen by PCP within the last 6 months per protocol (6/20). Will refill medications for 6 months.  Lab Results   Component Value Date/Time    HBA1C 7.2 (H) 05/26/2020 09:06 AM      Lab Results   Component Value Date/Time    MALBCRT 15 04/15/2019 08:51 AM    MICROALBUR 3.0 04/15/2019 08:51 AM      Lab Results   Component Value Date/Time    ALKPHOSPHAT 55 05/26/2020 09:06 AM    ASTSGOT 25 05/26/2020 09:06 AM    ALTSGPT 21 05/26/2020 09:06 AM    TBILIRUBIN 0.8 05/26/2020 09:06 AM

## 2020-09-30 DIAGNOSIS — E78.5 HYPERLIPIDEMIA, UNSPECIFIED HYPERLIPIDEMIA TYPE: ICD-10-CM

## 2020-10-01 RX ORDER — SIMVASTATIN 20 MG
20 TABLET ORAL
Qty: 90 TAB | Refills: 0 | Status: SHIPPED | OUTPATIENT
Start: 2020-10-01 | End: 2021-01-06

## 2020-10-05 DIAGNOSIS — E03.8 OTHER SPECIFIED HYPOTHYROIDISM: ICD-10-CM

## 2020-10-05 RX ORDER — LEVOTHYROXINE SODIUM 0.1 MG/1
100 TABLET ORAL
Qty: 90 TAB | Refills: 0 | Status: SHIPPED | OUTPATIENT
Start: 2020-10-05 | End: 2021-01-06

## 2020-10-19 ENCOUNTER — OFFICE VISIT (OUTPATIENT)
Dept: MEDICAL GROUP | Facility: PHYSICIAN GROUP | Age: 70
End: 2020-10-19
Payer: MEDICARE

## 2020-10-19 ENCOUNTER — TELEPHONE (OUTPATIENT)
Dept: MEDICAL GROUP | Facility: PHYSICIAN GROUP | Age: 70
End: 2020-10-19

## 2020-10-19 ENCOUNTER — HOSPITAL ENCOUNTER (OUTPATIENT)
Dept: LAB | Facility: MEDICAL CENTER | Age: 70
End: 2020-10-19
Attending: INTERNAL MEDICINE
Payer: MEDICARE

## 2020-10-19 ENCOUNTER — HOSPITAL ENCOUNTER (OUTPATIENT)
Dept: RADIOLOGY | Facility: MEDICAL CENTER | Age: 70
End: 2020-10-19
Attending: INTERNAL MEDICINE
Payer: MEDICARE

## 2020-10-19 VITALS
BODY MASS INDEX: 40.46 KG/M2 | SYSTOLIC BLOOD PRESSURE: 136 MMHG | TEMPERATURE: 98 F | HEIGHT: 64 IN | HEART RATE: 74 BPM | RESPIRATION RATE: 16 BRPM | DIASTOLIC BLOOD PRESSURE: 84 MMHG | WEIGHT: 237 LBS

## 2020-10-19 DIAGNOSIS — Z23 NEED FOR VACCINATION: ICD-10-CM

## 2020-10-19 DIAGNOSIS — E11.9 TYPE 2 DIABETES MELLITUS WITHOUT COMPLICATION, WITHOUT LONG-TERM CURRENT USE OF INSULIN (HCC): ICD-10-CM

## 2020-10-19 DIAGNOSIS — I82.511 CHRONIC DEEP VEIN THROMBOSIS (DVT) OF FEMORAL VEIN OF RIGHT LOWER EXTREMITY (HCC): Chronic | ICD-10-CM

## 2020-10-19 DIAGNOSIS — E03.9 HYPOTHYROIDISM, UNSPECIFIED TYPE: Chronic | ICD-10-CM

## 2020-10-19 DIAGNOSIS — M25.512 CHRONIC LEFT SHOULDER PAIN: ICD-10-CM

## 2020-10-19 DIAGNOSIS — I10 ESSENTIAL HYPERTENSION: Chronic | ICD-10-CM

## 2020-10-19 DIAGNOSIS — G89.29 CHRONIC LEFT SHOULDER PAIN: ICD-10-CM

## 2020-10-19 DIAGNOSIS — E78.5 HYPERLIPIDEMIA, UNSPECIFIED HYPERLIPIDEMIA TYPE: Chronic | ICD-10-CM

## 2020-10-19 DIAGNOSIS — E55.9 VITAMIN D DEFICIENCY: Chronic | ICD-10-CM

## 2020-10-19 LAB
25(OH)D3 SERPL-MCNC: 28 NG/ML (ref 30–100)
ALT SERPL-CCNC: 22 U/L (ref 2–50)
ANION GAP SERPL CALC-SCNC: 11 MMOL/L (ref 7–16)
BUN SERPL-MCNC: 22 MG/DL (ref 8–22)
CALCIUM SERPL-MCNC: 9.8 MG/DL (ref 8.5–10.5)
CHLORIDE SERPL-SCNC: 104 MMOL/L (ref 96–112)
CHOLEST SERPL-MCNC: 138 MG/DL (ref 100–199)
CO2 SERPL-SCNC: 28 MMOL/L (ref 20–33)
CREAT SERPL-MCNC: 0.73 MG/DL (ref 0.5–1.4)
CREAT UR-MCNC: 183.28 MG/DL
EST. AVERAGE GLUCOSE BLD GHB EST-MCNC: 160 MG/DL
FASTING STATUS PATIENT QL REPORTED: NORMAL
GLUCOSE SERPL-MCNC: 141 MG/DL (ref 65–99)
HBA1C MFR BLD: 7.2 % (ref 0–5.6)
HDLC SERPL-MCNC: 51 MG/DL
LDLC SERPL CALC-MCNC: 58 MG/DL
MICROALBUMIN UR-MCNC: 4.8 MG/DL
MICROALBUMIN/CREAT UR: 26 MG/G (ref 0–30)
POTASSIUM SERPL-SCNC: 4.1 MMOL/L (ref 3.6–5.5)
SODIUM SERPL-SCNC: 143 MMOL/L (ref 135–145)
TRIGL SERPL-MCNC: 143 MG/DL (ref 0–149)
TSH SERPL DL<=0.005 MIU/L-ACNC: 3.15 UIU/ML (ref 0.38–5.33)

## 2020-10-19 PROCEDURE — 99214 OFFICE O/P EST MOD 30 MIN: CPT | Mod: 25 | Performed by: INTERNAL MEDICINE

## 2020-10-19 PROCEDURE — 90662 IIV NO PRSV INCREASED AG IM: CPT | Performed by: INTERNAL MEDICINE

## 2020-10-19 PROCEDURE — 84443 ASSAY THYROID STIM HORMONE: CPT

## 2020-10-19 PROCEDURE — 80061 LIPID PANEL: CPT

## 2020-10-19 PROCEDURE — 82570 ASSAY OF URINE CREATININE: CPT

## 2020-10-19 PROCEDURE — G0008 ADMIN INFLUENZA VIRUS VAC: HCPCS | Performed by: INTERNAL MEDICINE

## 2020-10-19 PROCEDURE — 82043 UR ALBUMIN QUANTITATIVE: CPT

## 2020-10-19 PROCEDURE — 84460 ALANINE AMINO (ALT) (SGPT): CPT

## 2020-10-19 PROCEDURE — 36415 COLL VENOUS BLD VENIPUNCTURE: CPT

## 2020-10-19 PROCEDURE — 73030 X-RAY EXAM OF SHOULDER: CPT | Mod: LT

## 2020-10-19 PROCEDURE — 82306 VITAMIN D 25 HYDROXY: CPT

## 2020-10-19 PROCEDURE — 80048 BASIC METABOLIC PNL TOTAL CA: CPT

## 2020-10-19 PROCEDURE — 83036 HEMOGLOBIN GLYCOSYLATED A1C: CPT | Mod: GA

## 2020-10-19 RX ORDER — LISINOPRIL 2.5 MG/1
2.5 TABLET ORAL
Qty: 90 TAB | Refills: 1 | Status: SHIPPED | OUTPATIENT
Start: 2020-10-19 | End: 2021-01-25 | Stop reason: SDUPTHER

## 2020-10-19 RX ORDER — FUROSEMIDE 20 MG/1
20 TABLET ORAL
Qty: 30 TAB | Refills: 2 | Status: SHIPPED | OUTPATIENT
Start: 2020-10-19 | End: 2020-11-16

## 2020-10-19 NOTE — ASSESSMENT & PLAN NOTE
This is a chronic condition.  The patient stated that due to high cost he has been taking the Xarelto every other day instead of every day.  Stressed the importance to the patient that he need to take this medication on a daily basis.  Patient denies any history of bleeding.

## 2020-10-19 NOTE — ASSESSMENT & PLAN NOTE
This is a chronic condition but the patient is currently taking Actos and Metformin.  The patient denies significant hypoglycemic symptoms.  Patient is due for blood test.

## 2020-10-19 NOTE — PROGRESS NOTES
CC: f.u diabetes  Med refills.      HPI: This is a 70 y.o. pt.  Pt's medical history is notable for:     Type 2 diabetes mellitus without complication, without long-term current use of insulin (HCC)  This is a chronic condition but the patient is currently taking Actos and Metformin.  The patient denies significant hypoglycemic symptoms.  Patient is due for blood test.    Hypothyroidism  Chronic condition.  Currently he is taking levothyroxine.  Patient is due for blood test.    Deep vein thrombosis (HCC) [I82.409]  This is a chronic condition.  The patient stated that due to high cost he has been taking the Xarelto every other day instead of every day.  Stressed the importance to the patient that he need to take this medication on a daily basis.  Patient denies any history of bleeding.    Hyperlipidemia  Chronic stable condition currently he is taking simvastatin.  Patient is due for blood test.    Hypertension  This is chronic condition.  Patient is now taking lisinopril          REVIEW OF SYSTEMS:     Constitutional:  no fever / chills   Neurologic: no headaches, no numbness/tingling  Eyes: no changes in vision  ENT: no sore throat, no hearing loss  CV:  no chest pain, no palpitations  Pulmonary: no SOB, no cough    GI: no nausea / vomiting, no diarrhea, no constipation  :  no dysuria, no hematuria         Allergies: Nkda [no known drug allergy]    Current Outpatient Medications Ordered in Epic   Medication Sig Dispense Refill   • furosemide (LASIX) 20 MG Tab Take 1 Tab by mouth 1 time daily as needed (leg swelling). 30 Tab 2   • lisinopril (PRINIVIL) 2.5 MG Tab Take 1 Tab by mouth every day. 90 Tab 1   • levothyroxine (SYNTHROID) 100 MCG Tab Take 1 Tab by mouth Every morning on an empty stomach. 90 Tab 0   • simvastatin (ZOCOR) 20 MG Tab Take 1 Tab by mouth every day. N THE EVENING 90 Tab 0   • pioglitazone (ACTOS) 15 MG Tab TAKE 1 TABLET BY MOUTH EVERY DAY 90 Tab 1   • metformin (GLUCOPHAGE) 1000 MG tablet  TAKE 1 TABLET BY MOUTH TWICE A DAY WITH MEALS 180 Tab 0   • rivaroxaban (XARELTO) 20 MG Tab tablet Take 1 Tab by mouth with dinner. 90 Tab 0   • gabapentin (NEURONTIN) 600 MG tablet Take 1/2 tablet in the morning and full tablet at bedtime 135 Tab 0   • glimepiride (AMARYL) 4 MG Tab TAKE 1 TABLET BY MOUTH TWICE A  Tab 1   • vitamin D, Ergocalciferol, (DRISDOL) 1.25 MG (24822 UT) Cap capsule Take 1 Cap by mouth every 7 days. 15 Cap 3   • Lancets Lancets order: Lancets for One Touch Ultra 2 meter. Sig: use BID and prn ssx high or low sugar. 200 Each 3   • Blood Glucose Test Strips Test strips order: Test strips for One Touch Ultra 2 meter. Sig: use BID and prn ssx high or low sugar. 200 Each 3   • cyclobenzaprine (FLEXERIL) 5 MG tablet Take 1 Tab by mouth 2 times a day as needed for Moderate Pain for up to 30 doses. 30 Tab 0   • ibuprofen (MOTRIN) 800 MG Tab Take 800 mg by mouth every 8 hours as needed.     • albuterol 108 (90 BASE) MCG/ACT Aero Soln inhalation aerosol Inhale 2 Puffs by mouth every 6 hours as needed for Shortness of Breath. 8.5 g 3     No current [facility]-ordered facility-administered medications on file.        Past Medical History:   Diagnosis Date   • Deep vein thrombosis (HCC) [I82.409] 12/9/2016   • Dyslipidemia 1/12/2013   • Former smoker 12/7/2016   • High cholesterol    • Hypothyroidism 1/12/2013   • Low vitamin D level 5/3/2018   • Obesity (BMI 30-39.9) 5/3/2018   • Type II or unspecified type diabetes mellitus without mention of complication, uncontrolled 1/12/2013        Past Surgical History:   Procedure Laterality Date   • CERVICAL DISK AND FUSION ANTERIOR  1/11/2013    Performed by Fred Post M.D. at SURGERY [facility]   • CERVICAL DECOMPRESSION POSTERIOR  1/11/2013    Performed by Fred Post M.D. at SURGERY [facility]        Family History   Problem Relation Age of Onset   • Stroke Mother    • Cancer Father         brain         Social History     Tobacco Use    Smoking Status Former Smoker   • Years: 40.00   • Quit date: 2007   • Years since quittin.8   Smokeless Tobacco Never Used   Tobacco Comment    off and on smoker          Social History     Substance and Sexual Activity   Alcohol Use No   • Alcohol/week: 0.0 oz        ---------------------------------------------------------------------     PHYSICAL EXAM:   Vitals:    10/19/20 0956   BP: 136/84   Pulse: 74   Resp: 16   Temp: 36.7 °C (98 °F)      Body mass index is 40.18 kg/m².        Constitutional: no acute distress  Neck: supple, no JVD  CV: heart RRR  Resp: normal effort, no wheezing or rales.  GI: abdomen soft, no obvious mass, no tenderness  Neuro: CN 2-12 grossly intact  Feet: Skin integrity intact.  Peripheral pulses reduced at DP and PT.   Mild sensory deficit noted with monofilament testing.   Mild ankle swelling noted bilat.     ---------------------------------------------------------------------     ASSESSMENT and PLAN:  1. Need for vaccination    - Influenza Vaccine, High Dose (65+ Only)    2. Type 2 diabetes mellitus without complication, without long-term current use of insulin (HCC)  Chronic cond  Current control is unclear. Labs ordered  A1c goal of 7% discussed w pt.  Advised lifestyle modification.  Stressed the importance of low sweet /low carb diet, high lean protein, and avoid unhealthy foods.  Encourage pt to start/continue with regular exercises/walking.   - REFERRAL TO OPHTHALMOLOGY  - lisinopril (PRINIVIL) 2.5 MG Tab; Take 1 Tab by mouth every day.  Dispense: 90 Tab; Refill: 1  - HEMOGLOBIN A1C; Future  - Basic Metabolic Panel; Future  - MICROALBUMIN CREAT RATIO URINE; Future    3. Hypothyroidism, unspecified type  Cont levothyroxine  - TSH; Future    4. Vitamin D deficiency  Labs ordered  - VITAMIN D,25 HYDROXY; Future    5. Essential hypertension  Chronic stable cond  Cont lisinopril    6. Chronic deep vein thrombosis (DVT) of femoral vein of right lower extremity  (HCC)  Chronic cond/  Advise pt to take xarelto daily.  Furosemide 20mg qd prn swelling  rec the use of compression stockings  Sodium restriction advised    7. Hyperlipidemia, unspecified hyperlipidemia type  Cont simvastatin  - ALANINE AMINO-TRANS; Future  - Lipid Profile; Future    8. Chronic left shoulder pain  Pt reported ho fall onto L shoulder about 2mo ago  Still noted pain nitin w shoulder abduction    - DX-SHOULDER 2+ LEFT; Future    rec PT. Pt declined at this time.  May take otc tylenol prn pain        Return in about 6 months (around 4/19/2021).       PATIENT EDUCATION:  -If any problems should arise, patient was advised to contact our office or go to ER to be evaluated.  -Advised pt to follow a healthy diet and regular aerobic exercise regimen. Advised pt to avoid alcohol and tobacco use.    Please note that this dictation was created using voice recognition software. I have made every reasonable attempt to correct obvious errors, but it is possible there are errors of grammar and possibly content that I did not discover before finalizing the note.

## 2020-10-19 NOTE — TELEPHONE ENCOUNTER
Contacted pt and pt wife. Pt wife stated that she was really concerned because pt and pt wife had seen on the news that Metformin can cause cancer and the pt wife is worried because pt takes a lot of Metformin daily.    Please advise.

## 2020-10-19 NOTE — TELEPHONE ENCOUNTER
----- Message from Danielle Monroe sent at 10/19/2020 10:58 AM PDT -----  Regarding: Metformin  Contact: [phone]  Hi There,    Pt has a concern about Metformin. Please contact pt at

## 2020-11-11 DIAGNOSIS — M79.604 PAIN OF RIGHT LOWER EXTREMITY: ICD-10-CM

## 2020-11-13 RX ORDER — GABAPENTIN 600 MG/1
TABLET ORAL
Qty: 135 TAB | Refills: 1 | Status: ON HOLD | OUTPATIENT
Start: 2020-11-13 | End: 2021-11-18

## 2020-11-16 RX ORDER — FUROSEMIDE 20 MG/1
20 TABLET ORAL
Qty: 30 TAB | Refills: 2 | Status: SHIPPED | OUTPATIENT
Start: 2020-11-16 | End: 2021-02-16

## 2021-01-04 DIAGNOSIS — E78.5 HYPERLIPIDEMIA, UNSPECIFIED HYPERLIPIDEMIA TYPE: ICD-10-CM

## 2021-01-04 DIAGNOSIS — E03.8 OTHER SPECIFIED HYPOTHYROIDISM: ICD-10-CM

## 2021-01-06 RX ORDER — LEVOTHYROXINE SODIUM 0.1 MG/1
TABLET ORAL
Qty: 90 TAB | Refills: 2 | Status: SHIPPED | OUTPATIENT
Start: 2021-01-06 | End: 2021-05-13

## 2021-01-06 RX ORDER — SIMVASTATIN 20 MG
TABLET ORAL
Qty: 90 TAB | Refills: 2 | Status: SHIPPED | OUTPATIENT
Start: 2021-01-06 | End: 2021-04-12 | Stop reason: SDUPTHER

## 2021-01-06 NOTE — TELEPHONE ENCOUNTER
Patient was last seen by PCP 10/20. Last TSH read 3.15 (10/20). Will refill for 9 months.    Lab Results   Component Value Date/Time    CHOLSTRLTOT 138 10/19/2020 10:47 AM    LDL 58 10/19/2020 10:47 AM    HDL 51 10/19/2020 10:47 AM    TRIGLYCERIDE 143 10/19/2020 10:47 AM       Lab Results   Component Value Date/Time    SODIUM 143 10/19/2020 10:47 AM    POTASSIUM 4.1 10/19/2020 10:47 AM    CHLORIDE 104 10/19/2020 10:47 AM    CO2 28 10/19/2020 10:47 AM    GLUCOSE 141 (H) 10/19/2020 10:47 AM    BUN 22 10/19/2020 10:47 AM    CREATININE 0.73 10/19/2020 10:47 AM     Lab Results   Component Value Date/Time    ALKPHOSPHAT 55 05/26/2020 09:06 AM    ASTSGOT 25 05/26/2020 09:06 AM    ALTSGPT 22 10/19/2020 10:47 AM    TBILIRUBIN 0.8 05/26/2020 09:06 AM

## 2021-01-07 DIAGNOSIS — I82.511 CHRONIC DEEP VEIN THROMBOSIS (DVT) OF FEMORAL VEIN OF RIGHT LOWER EXTREMITY (HCC): ICD-10-CM

## 2021-01-13 DIAGNOSIS — E11.9 TYPE 2 DIABETES MELLITUS WITHOUT COMPLICATION, WITHOUT LONG-TERM CURRENT USE OF INSULIN (HCC): ICD-10-CM

## 2021-01-15 DIAGNOSIS — Z23 NEED FOR VACCINATION: ICD-10-CM

## 2021-01-15 RX ORDER — GLIMEPIRIDE 4 MG/1
TABLET ORAL
Qty: 180 TAB | Refills: 1 | Status: SHIPPED | OUTPATIENT
Start: 2021-01-15 | End: 2021-01-28 | Stop reason: SDUPTHER

## 2021-01-15 NOTE — TELEPHONE ENCOUNTER
Last seen by PCP 10/19/2020.     Lab Results   Component Value Date/Time    HBA1C 7.2 (H) 10/19/2020 10:47 AM      Lab Results   Component Value Date/Time    MALBCRT 26 10/19/2020 10:48 AM    MICROALBUR 4.8 10/19/2020 10:48 AM      Lab Results   Component Value Date/Time    ALKPHOSPHAT 55 05/26/2020 09:06 AM    ASTSGOT 25 05/26/2020 09:06 AM    ALTSGPT 22 10/19/2020 10:47 AM    TBILIRUBIN 0.8 05/26/2020 09:06 AM        Will send 6 month(s) to the pharmacy.

## 2021-01-25 DIAGNOSIS — E11.9 TYPE 2 DIABETES MELLITUS WITHOUT COMPLICATION, WITHOUT LONG-TERM CURRENT USE OF INSULIN (HCC): ICD-10-CM

## 2021-01-25 RX ORDER — LISINOPRIL 2.5 MG/1
2.5 TABLET ORAL
Qty: 100 TAB | Refills: 1 | Status: SHIPPED | OUTPATIENT
Start: 2021-01-25 | End: 2021-08-30

## 2021-01-25 NOTE — TELEPHONE ENCOUNTER
Refill X 6 months, sent to pharmacy.Pt. Seen in the last 6 months per protocol.   Lab Results   Component Value Date/Time    SODIUM 143 10/19/2020 10:47 AM    POTASSIUM 4.1 10/19/2020 10:47 AM    CHLORIDE 104 10/19/2020 10:47 AM    CO2 28 10/19/2020 10:47 AM    GLUCOSE 141 (H) 10/19/2020 10:47 AM    BUN 22 10/19/2020 10:47 AM    CREATININE 0.73 10/19/2020 10:47 AM

## 2021-01-28 DIAGNOSIS — E11.9 TYPE 2 DIABETES MELLITUS WITHOUT COMPLICATION, WITHOUT LONG-TERM CURRENT USE OF INSULIN (HCC): ICD-10-CM

## 2021-01-28 RX ORDER — GLIMEPIRIDE 4 MG/1
TABLET ORAL
Qty: 200 TAB | Refills: 1 | Status: SHIPPED | OUTPATIENT
Start: 2021-01-28 | End: 2021-08-30

## 2021-01-28 NOTE — TELEPHONE ENCOUNTER
Received request via: Pharmacy    Was the patient seen in the last year in this department? Yes    Does the patient have an active prescription (recently filled or refills available) for medication(s) requested? Yes  Insurance pays for 100 day supply

## 2021-02-11 DIAGNOSIS — E11.9 TYPE 2 DIABETES MELLITUS WITHOUT COMPLICATION, WITHOUT LONG-TERM CURRENT USE OF INSULIN (HCC): ICD-10-CM

## 2021-02-16 RX ORDER — PIOGLITAZONEHYDROCHLORIDE 15 MG/1
TABLET ORAL
Qty: 90 TABLET | Refills: 1 | Status: SHIPPED | OUTPATIENT
Start: 2021-02-16 | End: 2021-02-23 | Stop reason: SDUPTHER

## 2021-02-16 RX ORDER — FUROSEMIDE 20 MG/1
20 TABLET ORAL
Qty: 90 TABLET | Refills: 1 | Status: ON HOLD | OUTPATIENT
Start: 2021-02-16 | End: 2021-11-18

## 2021-02-16 NOTE — TELEPHONE ENCOUNTER
Patient has recently been seen by PCP within the last 6 months per protocol (10/20). Will refill medications for 6 months.  Lab Results   Component Value Date/Time    HBA1C 7.2 (H) 10/19/2020 10:47 AM      Lab Results   Component Value Date/Time    MALBCRT 26 10/19/2020 10:48 AM    MICROALBUR 4.8 10/19/2020 10:48 AM      Lab Results   Component Value Date/Time    ALKPHOSPHAT 55 05/26/2020 09:06 AM    ASTSGOT 25 05/26/2020 09:06 AM    ALTSGPT 22 10/19/2020 10:47 AM    TBILIRUBIN 0.8 05/26/2020 09:06 AM

## 2021-02-23 DIAGNOSIS — E11.9 TYPE 2 DIABETES MELLITUS WITHOUT COMPLICATION, WITHOUT LONG-TERM CURRENT USE OF INSULIN (HCC): ICD-10-CM

## 2021-02-23 RX ORDER — PIOGLITAZONEHYDROCHLORIDE 15 MG/1
15 TABLET ORAL
Qty: 100 TABLET | Refills: 1 | Status: SHIPPED | OUTPATIENT
Start: 2021-02-23 | End: 2021-10-18 | Stop reason: SDUPTHER

## 2021-03-05 DIAGNOSIS — I82.511 CHRONIC DEEP VEIN THROMBOSIS (DVT) OF FEMORAL VEIN OF RIGHT LOWER EXTREMITY (HCC): ICD-10-CM

## 2021-03-09 RX ORDER — RIVAROXABAN 20 MG/1
TABLET, FILM COATED ORAL
Qty: 90 TABLET | Refills: 1 | Status: SHIPPED | OUTPATIENT
Start: 2021-03-09 | End: 2021-08-26

## 2021-04-06 ENCOUNTER — PATIENT OUTREACH (OUTPATIENT)
Dept: HEALTH INFORMATION MANAGEMENT | Facility: OTHER | Age: 71
End: 2021-04-06

## 2021-04-06 NOTE — PROGRESS NOTES
Outcome: Left Message for CGA     Please transfer to Patient Outreach Team at [phone] when patient returns call.        Attempt #1

## 2021-04-12 DIAGNOSIS — E78.5 HYPERLIPIDEMIA, UNSPECIFIED HYPERLIPIDEMIA TYPE: ICD-10-CM

## 2021-04-12 RX ORDER — SIMVASTATIN 20 MG
20 TABLET ORAL
Qty: 90 TABLET | Refills: 2 | Status: SHIPPED | OUTPATIENT
Start: 2021-04-12 | End: 2021-12-13 | Stop reason: SDUPTHER

## 2021-04-12 NOTE — TELEPHONE ENCOUNTER
Received request via: Insurance    Was the patient seen in the last year in this department? Yes    Does the patient have an active prescription (recently filled or refills available) for medication(s) requested? Yes.

## 2021-05-11 ENCOUNTER — OFFICE VISIT (OUTPATIENT)
Dept: MEDICAL GROUP | Facility: PHYSICIAN GROUP | Age: 71
End: 2021-05-11
Payer: MEDICARE

## 2021-05-11 VITALS
BODY MASS INDEX: 37.61 KG/M2 | SYSTOLIC BLOOD PRESSURE: 124 MMHG | TEMPERATURE: 97.9 F | HEIGHT: 66 IN | RESPIRATION RATE: 16 BRPM | OXYGEN SATURATION: 97 % | DIASTOLIC BLOOD PRESSURE: 60 MMHG | WEIGHT: 234 LBS

## 2021-05-11 DIAGNOSIS — I82.5Y9 CHRONIC DEEP VEIN THROMBOSIS (DVT) OF PROXIMAL VEIN OF LOWER EXTREMITY, UNSPECIFIED LATERALITY (HCC): ICD-10-CM

## 2021-05-11 DIAGNOSIS — E11.42 DIABETIC POLYNEUROPATHY ASSOCIATED WITH TYPE 2 DIABETES MELLITUS (HCC): Chronic | ICD-10-CM

## 2021-05-11 DIAGNOSIS — I10 ESSENTIAL HYPERTENSION: Chronic | ICD-10-CM

## 2021-05-11 DIAGNOSIS — E78.5 HYPERLIPIDEMIA, UNSPECIFIED HYPERLIPIDEMIA TYPE: Chronic | ICD-10-CM

## 2021-05-11 DIAGNOSIS — E55.9 VITAMIN D DEFICIENCY: ICD-10-CM

## 2021-05-11 DIAGNOSIS — Z13.0 SCREENING FOR DEFICIENCY ANEMIA: ICD-10-CM

## 2021-05-11 DIAGNOSIS — E03.9 HYPOTHYROIDISM, UNSPECIFIED TYPE: Chronic | ICD-10-CM

## 2021-05-11 PROBLEM — I82.509 CHRONIC DEEP VEIN THROMBOSIS (DVT) (HCC): Status: ACTIVE | Noted: 2021-05-11

## 2021-05-11 PROCEDURE — G0439 PPPS, SUBSEQ VISIT: HCPCS | Performed by: INTERNAL MEDICINE

## 2021-05-11 ASSESSMENT — PATIENT HEALTH QUESTIONNAIRE - PHQ9: CLINICAL INTERPRETATION OF PHQ2 SCORE: 0

## 2021-05-11 ASSESSMENT — ACTIVITIES OF DAILY LIVING (ADL): BATHING_REQUIRES_ASSISTANCE: 0

## 2021-05-11 ASSESSMENT — ENCOUNTER SYMPTOMS: GENERAL WELL-BEING: FAIR

## 2021-05-11 NOTE — ASSESSMENT & PLAN NOTE
Patient currently taking Actos and glimepiride.  He also taking Metformin.  Blood tests ordered for follow-up.

## 2021-05-11 NOTE — ASSESSMENT & PLAN NOTE
Presently patient taking simvastatin.  No significant side effects reported.  Patient is due for lab test

## 2021-05-11 NOTE — PROGRESS NOTES
Annual Health Assessment Questions:    1.  Are you currently engaging in any exercise or physical activity? yes    2.  How would you describe your mood or emotional well-being today? fair    3.  Have you had any falls in the last year? No    4.  Have you noticed any problems with your balance or had difficulty walking? Yes    5.  In the last six months have you experienced any leakage of urine? No    6. DPA/Advanced Directive: Patient does not have an Advanced Directive.  A packet and workshop information was given on Advanced Directives.

## 2021-05-11 NOTE — ASSESSMENT & PLAN NOTE
Patient reported history of DVT right lower extremity approximately 4 years ago.  He has been on anticoagulation therapy since last 4 years.  No personal family history of blood clots.  Denies any history of bleeding.    Patient would like to know if it is safe to discontinue the .

## 2021-05-11 NOTE — PROGRESS NOTES
CC: Annual wellness exam      HPI: This is a 71 y.o. pt.  Pt's medical history is notable for:     Chronic deep vein thrombosis (DVT) (HCC)  Patient reported history of DVT right lower extremity approximately 4 years ago.  He has been on anticoagulation therapy since last 4 years.  No personal family history of blood clots.  Denies any history of bleeding.    Patient would like to know if it is safe to discontinue the .      Diabetic neuropathy associated with type 2 diabetes mellitus (HCC)  Patient currently taking Actos and glimepiride.  He also taking Metformin.  Blood tests ordered for follow-up.    Hyperlipidemia  Presently patient taking simvastatin.  No significant side effects reported.  Patient is due for lab test    Hypertension  Chronic condition.  Patient is now taking furosemide and lisinopril.    Hypothyroidism  Patient is currently taking levothyroxine daily.  Lab tests ordered for follow-up.          REVIEW OF SYSTEMS:     Constitutional:  no fever / chills   Neurologic: no headaches  Eyes: no changes in vision  ENT: no sore throat, no hearing loss  CV:  no chest pain, no palpitations  Pulmonary: no SOB, no cough          Allergies: Nkda [no known drug allergy]    Current Outpatient Medications Ordered in Epic   Medication Sig Dispense Refill   • simvastatin (ZOCOR) 20 MG Tab Take 1 tablet by mouth every day. N THE EVENING 90 tablet 2   • XARELTO 20 MG Tab tablet TAKE 1 TAB BY MOUTH WITH DINNER. 90 tablet 1   • pioglitazone (ACTOS) 15 MG Tab Take 1 tablet by mouth every day. 100 tablet 1   • furosemide (LASIX) 20 MG Tab TAKE 1 TAB BY MOUTH 1 TIME DAILY AS NEEDED (LEG SWELLING). 90 tablet 1   • glimepiride (AMARYL) 4 MG Tab TAKE 1 TABLET BY MOUTH TWICE A  Tab 1   • lisinopril (PRINIVIL) 2.5 MG Tab Take 1 Tab by mouth every day. 100 Tab 1   • levothyroxine (SYNTHROID) 100 MCG Tab TAKE 1 TABLET BY MOUTH EVERY MORNING ON AN EMPTY STOMACH 90 Tab 2   • gabapentin (NEURONTIN) 600 MG tablet TAKE 1/2  TABLET IN THE MORNING AND FULL TABLET AT BEDTIME 135 Tab 1   • metformin (GLUCOPHAGE) 1000 MG tablet TAKE 1 TABLET BY MOUTH TWICE A DAY WITH MEALS 180 Tab 0   • vitamin D, Ergocalciferol, (DRISDOL) 1.25 MG (27920 UT) Cap capsule Take 1 Cap by mouth every 7 days. 15 Cap 3   • Lancets Lancets order: Lancets for One Touch Ultra 2 meter. Sig: use BID and prn ssx high or low sugar. 200 Each 3   • Blood Glucose Test Strips Test strips order: Test strips for One Touch Ultra 2 meter. Sig: use BID and prn ssx high or low sugar. 200 Each 3   • cyclobenzaprine (FLEXERIL) 5 MG tablet Take 1 Tab by mouth 2 times a day as needed for Moderate Pain for up to 30 doses. 30 Tab 0   • albuterol 108 (90 BASE) MCG/ACT Aero Soln inhalation aerosol Inhale 2 Puffs by mouth every 6 hours as needed for Shortness of Breath. 8.5 g 3     No current [facility]-ordered facility-administered medications on file.       Past Medical, Social, and Family history reviewed and updated in EPIC     ------------------------------------------------------------------------------     PHYSICAL EXAM:   Vitals:    05/11/21 0929   BP: 124/60   Resp: 16   Temp: 36.6 °C (97.9 °F)   SpO2: 97%      Body mass index is 37.48 kg/m².         Constitutional: no acute distress  CV: heart RRR  Resp: normal effort, no wheezing or rales.  GI: abdomen soft, no obvious mass, no tenderness  Neuro: CN 2-12 grossly intact  Monofilament testing with a 10 gram force: sensation intact: decreased bilaterally  Visual Inspection: Feet without maceration, ulcers, fissures.  Pedal pulses: decreased bilaterally      -----------------------------------------------------------------------------    ASSESSMENT:   1. Chronic deep vein thrombosis (DVT) of proximal vein of lower extremity, unspecified laterality (HCC)  US-EXTREMITY VENOUS LOWER UNILAT RIGHT    ANTICARDIOLIPIN AB IGG,IGM,IGA    FACTOR V LEIDEN MUTATION    PROTEIN C DEFICIENCY PROFILE    PROTEIN S FUNCTIONAL    REFERRAL TO HEMATOLOGY  ONCOLOGY   2. Diabetic polyneuropathy associated with type 2 diabetes mellitus (HCC)  HEMOGLOBIN A1C    Basic Metabolic Panel    MICROALBUMIN CREAT RATIO URINE    CANCELED: REFERRAL TO OPHTHALMOLOGY   3. Hyperlipidemia, unspecified hyperlipidemia type  ALANINE AMINO-TRANS    Lipid Profile   4. Essential hypertension     5. Hypothyroidism, unspecified type  TSH   6. Vitamin D deficiency  VITAMIN D,25 HYDROXY   7. Screening for deficiency anemia  CBC WITHOUT DIFFERENTIAL           MEDICAL DECISION MAKING: DISCUSSION / STATUS / PLAN:    DVT, right lower extremity.  As above this condition diagnosed 4 years ago and the patient has been on anticoagulation therapy since that time.  No history of bleeding or any complication.  Recommend hypercoagulable work-up to be done.  Ultrasound of the right lower extremity requested  Also refer the patient to see him otologist to get a recommendation whether or not we could safely discontinue the Xarelto.    Diabetic neuropathy.  Current control is unclear.  Lab tests ordered.  A1c goal of 7% discussed w pt.  Advised lifestyle modification.  Stressed the importance of low sweet /low carb diet, high lean protein, and avoid unhealthy foods (¼ plate starch, ¼ plate protein, ½ plate non-starchy vegetables)  Exercise recommendations of approx 150 minutes/week as tolerated.  Patient already had a diabetic eye examination November 2020.      Hyperlipidemia.  Lipid panel requested.    Hypertension.  BP stable.  Continue with current management    Hypothyroidism.  TSH ordered.       Return in about 6 months (around 11/11/2021).     -If any problems should arise, patient was advised to contact our office or go to ER to be evaluated.    Please note that this dictation was created using voice recognition software. I have made every reasonable attempt to correct obvious errors, but it is possible there are errors of grammar and possibly content that I did not discover before finalizing the  note.          Chief Complaint   Patient presents with   • Annual Wellness Visit         HPI:  Ravindra is a 71 y.o. here for Medicare Annual Wellness Visit        Patient Active Problem List    Diagnosis Date Noted   • Chronic deep vein thrombosis (DVT) (Formerly [facility]) 05/11/2021   • Hypertension 06/09/2020   • Vitamin D deficiency 06/09/2020   • Diabetic neuropathy associated with type 2 diabetes mellitus (HCC) 06/09/2020   • Hyperlipidemia 03/05/2020   • Leg pain 07/18/2019   • Lipoma 08/13/2018   • BMI 37.0-37.9, adult 05/03/2018   • Hypothyroidism 01/12/2013   • Spinal stenosis in cervical region 12/26/2012   • Cervical stenosis of spine 12/17/2012       Current Outpatient Medications   Medication Sig Dispense Refill   • Blood Glucose Test Strips Test strips order: Test strips for One Touch Ultra 2 meter. Sig: use BID and prn ssx high or low sugar. 200 Strip 0   • levothyroxine (SYNTHROID) 112 MCG Tab Take 1 tablet by mouth every morning on an empty stomach. 90 tablet 3   • glucose blood strip 1 Strip by Other route as needed. 100 Strip 3   • simvastatin (ZOCOR) 20 MG Tab Take 1 tablet by mouth every day. N THE EVENING 90 tablet 2   • XARELTO 20 MG Tab tablet TAKE 1 TAB BY MOUTH WITH DINNER. 90 tablet 1   • pioglitazone (ACTOS) 15 MG Tab Take 1 tablet by mouth every day. 100 tablet 1   • furosemide (LASIX) 20 MG Tab TAKE 1 TAB BY MOUTH 1 TIME DAILY AS NEEDED (LEG SWELLING). 90 tablet 1   • glimepiride (AMARYL) 4 MG Tab TAKE 1 TABLET BY MOUTH TWICE A  Tab 1   • lisinopril (PRINIVIL) 2.5 MG Tab Take 1 Tab by mouth every day. 100 Tab 1   • gabapentin (NEURONTIN) 600 MG tablet TAKE 1/2 TABLET IN THE MORNING AND FULL TABLET AT BEDTIME 135 Tab 1   • metformin (GLUCOPHAGE) 1000 MG tablet TAKE 1 TABLET BY MOUTH TWICE A DAY WITH MEALS 180 Tab 0   • vitamin D, Ergocalciferol, (DRISDOL) 1.25 MG (96480 UT) Cap capsule Take 1 Cap by mouth every 7 days. 15 Cap 3   • Lancets Lancets order: Lancets for One Touch Ultra 2 meter. Sig:  use BID and prn ssx high or low sugar. 200 Each 3   • cyclobenzaprine (FLEXERIL) 5 MG tablet Take 1 Tab by mouth 2 times a day as needed for Moderate Pain for up to 30 doses. 30 Tab 0   • albuterol 108 (90 BASE) MCG/ACT Aero Soln inhalation aerosol Inhale 2 Puffs by mouth every 6 hours as needed for Shortness of Breath. 8.5 g 3     No current facility-administered medications for this visit.        Patient is taking medications as noted in medication list.  Current supplements as per medication list.     Allergies: Nkda [no known drug allergy]    Current social contact/activities: walking    He  reports that he quit smoking about 14 years ago. He quit after 40.00 years of use. He has never used smokeless tobacco. He reports that he does not drink alcohol and does not use drugs.  Counseling given: Not Answered  Comment: off and on smoker        Screening:        Depression Screening    Little interest or pleasure in doing things?  0 - not at all  Feeling down, depressed, or hopeless? 0 - not at all  Patient Health Questionnaire Score: 0    If depressive symptoms identified deferred to follow up visit unless specifically addressed in assessment and plan.    Interpretation of PHQ-9 Total Score   Score Severity   1-4 No Depression   5-9 Mild Depression   10-14 Moderate Depression   15-19 Moderately Severe Depression   20-27 Severe Depression    Screening for Cognitive Impairment    Three Minute Recall (captain, garden, picture)  1/3    Guy clock face with all 12 numbers and set the hands to show 5 past 8.  Yes 5  If cognitive concerns identified, deferred for follow up unless specifically addressed in assessment and plan.    Fall Risk Assessment    Has the patient had two or more falls in the last year or any fall with injury in the last year?  No  If fall risk identified, deferred for follow up unless specifically addressed in assessment and plan.    Safety Assessment    Throw rugs on floor.  Yes  Handrails on all  stairs.  No  Good lighting in all hallways.  Yes  Difficulty hearing.  No  Patient counseled about all safety risks that were identified.    Functional Assessment ADLs    Are there any barriers preventing you from cooking for yourself or meeting nutritional needs?  No.    Are there any barriers preventing you from driving safely or obtaining transportation?  No.    Are there any barriers preventing you from using a telephone or calling for help?  No.    Are there any barriers preventing you from shopping?  No.    Are there any barriers preventing you from taking care of your own finances?  No.    Are there any barriers preventing you from managing your medications?  No.    Are there any barriers preventing you from showering, bathing or dressing yourself?  No.    Are you currently engaging in any exercise or physical activity?  No.     What is your perception of your health?  Fair.    Health Maintenance Summary                COVID-19 Vaccine Overdue 3/4/1962     COLON CANCER SCREENING ANNUAL FIT Overdue 5/7/2019      Done 5/7/2018 OCCULT BLOOD FECES IMMUNOASSAY     Patient has more history with this topic...    IMM ZOSTER VACCINES Postponed 10/11/2021 Originally 3/4/1998. Patient Refused    IMM DTaP/Tdap/Td Vaccine Postponed 5/11/2022 Originally 3/4/1967. Patient Refused    A1C SCREENING Next Due 11/12/2021      Done 5/12/2021 HEMOGLOBIN A1C     Patient has more history with this topic...    RETINAL SCREENING Next Due 11/17/2021      Done 11/17/2020      Patient has more history with this topic...    DIABETES MONOFILAMENT / LE EXAM Next Due 5/11/2022      Done 5/11/2021 [employer]: WORKFLOW - DIABETES - DIABETIC FOOT EXAM PERFORMED     Patient has more history with this topic...    FASTING LIPID PROFILE Next Due 5/12/2022      Done 5/12/2021 LIPID PROFILE     Patient has more history with this topic...    URINE ACR / MICROALBUMIN Next Due 5/12/2022      Done 5/12/2021 MICROALBUMIN CREAT RATIO URINE     Patient has  "more history with this topic...    SERUM CREATININE Next Due 2022      Done 2021 BASIC METABOLIC PANEL     Patient has more history with this topic...          Patient Care Team:  Antony Alonzo M.D. as PCP - General (Internal Medicine)    Social History     Tobacco Use   • Smoking status: Former Smoker     Years: 40.00     Quit date: 2007     Years since quittin.4   • Smokeless tobacco: Never Used   • Tobacco comment: off and on smoker   Vaping Use   • Vaping Use: Never used   Substance Use Topics   • Alcohol use: No     Alcohol/week: 0.0 oz   • Drug use: No     Family History   Problem Relation Age of Onset   • Stroke Mother    • Cancer Father         brain      He  has a past medical history of Deep vein thrombosis (HCC) [I82.409] (2016), Dyslipidemia (2013), Former smoker (2016), High cholesterol, Hypothyroidism (2013), Low vitamin D level (5/3/2018), Obesity (BMI 30-39.9) (5/3/2018), and Type II or unspecified type diabetes mellitus without mention of complication, uncontrolled (2013).   Past Surgical History:   Procedure Laterality Date   • CERVICAL DISK AND FUSION ANTERIOR  2013    Performed by Fred Post M.D. at SURGERY [facility]   • CERVICAL DECOMPRESSION POSTERIOR  2013    Performed by Fred Post M.D. at SURGERY [facility]           Exam:     /60 (BP Location: Left arm, Patient Position: Sitting, BP Cuff Size: Large adult)   Temp 36.6 °C (97.9 °F) (Temporal)   Resp 16   Ht 1.683 m (5' 6.25\")   Wt 106 kg (234 lb)   SpO2 97%  Body mass index is 37.48 kg/m².    Hearing fair.    Dentition fair  Alert, oriented in no acute distress.  Eye contact is good, speech goal directed, affect calm      Assessment and Plan. The following treatment and monitoring plan is recommended:    1. Chronic deep vein thrombosis (DVT) of proximal vein of lower extremity, unspecified laterality (HCC)  US-EXTREMITY VENOUS LOWER UNILAT RIGHT    " ANTICARDIOLIPIN AB IGG,IGM,IGA    FACTOR V LEIDEN MUTATION    PROTEIN C DEFICIENCY PROFILE    PROTEIN S FUNCTIONAL    REFERRAL TO HEMATOLOGY ONCOLOGY   2. Diabetic polyneuropathy associated with type 2 diabetes mellitus (HCC)  HEMOGLOBIN A1C    Basic Metabolic Panel    MICROALBUMIN CREAT RATIO URINE    CANCELED: REFERRAL TO OPHTHALMOLOGY   3. Hyperlipidemia, unspecified hyperlipidemia type  ALANINE AMINO-TRANS    Lipid Profile   4. Essential hypertension     5. Hypothyroidism, unspecified type  TSH   6. Vitamin D deficiency  VITAMIN D,25 HYDROXY   7. Screening for deficiency anemia  CBC WITHOUT DIFFERENTIAL           Health Care Screening recommendations as per orders if indicated.  Referrals offered: PT/OT/Nutrition counseling/Behavioral Health/Smoking cessation as per orders if indicated.    Discussion today about general wellness and lifestyle habits:    · Prevent falls and reduce trip hazards; Cautioned about securing or removing rugs.  · Have a working fire alarm and carbon monoxide detector;   · Engage in regular physical activity and social activities       Follow-up: Return in about 6 months (around 11/11/2021).

## 2021-05-12 ENCOUNTER — HOSPITAL ENCOUNTER (OUTPATIENT)
Dept: LAB | Facility: MEDICAL CENTER | Age: 71
End: 2021-05-12
Attending: INTERNAL MEDICINE
Payer: MEDICARE

## 2021-05-12 DIAGNOSIS — E55.9 VITAMIN D DEFICIENCY: ICD-10-CM

## 2021-05-12 DIAGNOSIS — E11.42 DIABETIC POLYNEUROPATHY ASSOCIATED WITH TYPE 2 DIABETES MELLITUS (HCC): Chronic | ICD-10-CM

## 2021-05-12 DIAGNOSIS — Z13.0 SCREENING FOR DEFICIENCY ANEMIA: ICD-10-CM

## 2021-05-12 DIAGNOSIS — I82.5Y9 CHRONIC DEEP VEIN THROMBOSIS (DVT) OF PROXIMAL VEIN OF LOWER EXTREMITY, UNSPECIFIED LATERALITY (HCC): ICD-10-CM

## 2021-05-12 DIAGNOSIS — E03.9 HYPOTHYROIDISM, UNSPECIFIED TYPE: Chronic | ICD-10-CM

## 2021-05-12 DIAGNOSIS — E78.5 HYPERLIPIDEMIA, UNSPECIFIED HYPERLIPIDEMIA TYPE: Chronic | ICD-10-CM

## 2021-05-12 LAB
ALT SERPL-CCNC: 17 U/L (ref 2–50)
ANION GAP SERPL CALC-SCNC: 9 MMOL/L (ref 7–16)
BUN SERPL-MCNC: 21 MG/DL (ref 8–22)
CALCIUM SERPL-MCNC: 9.5 MG/DL (ref 8.5–10.5)
CHLORIDE SERPL-SCNC: 103 MMOL/L (ref 96–112)
CHOLEST SERPL-MCNC: 149 MG/DL (ref 100–199)
CO2 SERPL-SCNC: 26 MMOL/L (ref 20–33)
CREAT SERPL-MCNC: 0.92 MG/DL (ref 0.5–1.4)
CREAT UR-MCNC: 185.31 MG/DL
ERYTHROCYTE [DISTWIDTH] IN BLOOD BY AUTOMATED COUNT: 47.2 FL (ref 35.9–50)
EST. AVERAGE GLUCOSE BLD GHB EST-MCNC: 148 MG/DL
FASTING STATUS PATIENT QL REPORTED: NORMAL
GLUCOSE SERPL-MCNC: 168 MG/DL (ref 65–99)
HBA1C MFR BLD: 6.8 % (ref 4–5.6)
HCT VFR BLD AUTO: 45.5 % (ref 42–52)
HDLC SERPL-MCNC: 45 MG/DL
HGB BLD-MCNC: 15 G/DL (ref 14–18)
LDLC SERPL CALC-MCNC: 79 MG/DL
MCH RBC QN AUTO: 30.6 PG (ref 27–33)
MCHC RBC AUTO-ENTMCNC: 33 G/DL (ref 33.7–35.3)
MCV RBC AUTO: 92.9 FL (ref 81.4–97.8)
MICROALBUMIN UR-MCNC: 4.4 MG/DL
MICROALBUMIN/CREAT UR: 24 MG/G (ref 0–30)
PLATELET # BLD AUTO: 228 K/UL (ref 164–446)
PMV BLD AUTO: 11.6 FL (ref 9–12.9)
POTASSIUM SERPL-SCNC: 4.4 MMOL/L (ref 3.6–5.5)
RBC # BLD AUTO: 4.9 M/UL (ref 4.7–6.1)
SODIUM SERPL-SCNC: 138 MMOL/L (ref 135–145)
TRIGL SERPL-MCNC: 123 MG/DL (ref 0–149)
TSH SERPL DL<=0.005 MIU/L-ACNC: 5.68 UIU/ML (ref 0.38–5.33)
WBC # BLD AUTO: 5.8 K/UL (ref 4.8–10.8)

## 2021-05-12 PROCEDURE — 82306 VITAMIN D 25 HYDROXY: CPT

## 2021-05-12 PROCEDURE — 82570 ASSAY OF URINE CREATININE: CPT

## 2021-05-12 PROCEDURE — 84460 ALANINE AMINO (ALT) (SGPT): CPT

## 2021-05-12 PROCEDURE — 86147 CARDIOLIPIN ANTIBODY EA IG: CPT | Mod: 91

## 2021-05-12 PROCEDURE — 84443 ASSAY THYROID STIM HORMONE: CPT

## 2021-05-12 PROCEDURE — 83036 HEMOGLOBIN GLYCOSYLATED A1C: CPT

## 2021-05-12 PROCEDURE — 85303 CLOT INHIBIT PROT C ACTIVITY: CPT

## 2021-05-12 PROCEDURE — 80061 LIPID PANEL: CPT

## 2021-05-12 PROCEDURE — 80048 BASIC METABOLIC PNL TOTAL CA: CPT

## 2021-05-12 PROCEDURE — 82043 UR ALBUMIN QUANTITATIVE: CPT

## 2021-05-12 PROCEDURE — 85027 COMPLETE CBC AUTOMATED: CPT

## 2021-05-12 PROCEDURE — 36415 COLL VENOUS BLD VENIPUNCTURE: CPT

## 2021-05-12 PROCEDURE — 85306 CLOT INHIBIT PROT S FREE: CPT

## 2021-05-12 PROCEDURE — 81241 F5 GENE: CPT

## 2021-05-13 ENCOUNTER — TELEPHONE (OUTPATIENT)
Dept: MEDICAL GROUP | Facility: PHYSICIAN GROUP | Age: 71
End: 2021-05-13

## 2021-05-13 DIAGNOSIS — E03.9 HYPOTHYROIDISM, UNSPECIFIED TYPE: ICD-10-CM

## 2021-05-13 RX ORDER — LEVOTHYROXINE SODIUM 112 UG/1
112 TABLET ORAL
Qty: 90 TABLET | Refills: 3 | Status: SHIPPED | OUTPATIENT
Start: 2021-05-13 | End: 2022-05-09

## 2021-05-14 LAB
25(OH)D3 SERPL-MCNC: 41 NG/ML (ref 30–80)
CARDIOLIPIN IGA SER IA-ACNC: 1 APL (ref 0–11)
CARDIOLIPIN IGG SER IA-ACNC: 2 GPL (ref 0–14)
CARDIOLIPIN IGM SER IA-ACNC: 9 MPL (ref 0–12)
PROT C ACT/NOR PPP: 194 % (ref 83–168)
PROT S ACT/NOR PPP: 127 % (ref 66–143)

## 2021-05-17 LAB — F5 P.R506Q BLD/T QL: NEGATIVE

## 2021-06-02 ENCOUNTER — HOSPITAL ENCOUNTER (OUTPATIENT)
Dept: RADIOLOGY | Facility: MEDICAL CENTER | Age: 71
End: 2021-06-02
Attending: INTERNAL MEDICINE
Payer: MEDICARE

## 2021-06-02 DIAGNOSIS — I82.5Y9 CHRONIC DEEP VEIN THROMBOSIS (DVT) OF PROXIMAL VEIN OF LOWER EXTREMITY, UNSPECIFIED LATERALITY (HCC): ICD-10-CM

## 2021-06-02 PROCEDURE — 93971 EXTREMITY STUDY: CPT | Mod: RT

## 2021-06-03 ENCOUNTER — TELEPHONE (OUTPATIENT)
Dept: MEDICAL GROUP | Facility: PHYSICIAN GROUP | Age: 71
End: 2021-06-03

## 2021-06-03 NOTE — TELEPHONE ENCOUNTER
Spoke wit patient's wife Jessy regarding ultrasound results. Answered questions. Patient will be seeing hematology in a couple of weeks.

## 2021-07-01 DIAGNOSIS — E11.9 TYPE 2 DIABETES MELLITUS WITHOUT COMPLICATION, WITHOUT LONG-TERM CURRENT USE OF INSULIN (HCC): ICD-10-CM

## 2021-08-02 DIAGNOSIS — R79.89 LOW VITAMIN D LEVEL: ICD-10-CM

## 2021-08-05 RX ORDER — ERGOCALCIFEROL 1.25 MG/1
CAPSULE ORAL
Qty: 12 CAPSULE | Refills: 1 | Status: SHIPPED | OUTPATIENT
Start: 2021-08-05 | End: 2022-01-16

## 2021-08-17 PROBLEM — N28.89 LEFT KIDNEY MASS: Chronic | Status: ACTIVE | Noted: 2021-08-17

## 2021-08-17 PROBLEM — N28.89 LEFT KIDNEY MASS: Status: ACTIVE | Noted: 2021-08-17

## 2021-08-24 DIAGNOSIS — I82.511 CHRONIC DEEP VEIN THROMBOSIS (DVT) OF FEMORAL VEIN OF RIGHT LOWER EXTREMITY (HCC): ICD-10-CM

## 2021-08-26 DIAGNOSIS — E11.9 TYPE 2 DIABETES MELLITUS WITHOUT COMPLICATION, WITHOUT LONG-TERM CURRENT USE OF INSULIN (HCC): ICD-10-CM

## 2021-08-26 RX ORDER — RIVAROXABAN 20 MG/1
TABLET, FILM COATED ORAL
Qty: 90 TABLET | Refills: 1 | Status: SHIPPED | OUTPATIENT
Start: 2021-08-26 | End: 2022-06-09

## 2021-08-26 NOTE — TELEPHONE ENCOUNTER
DR Alonzo- It looked like there was question during OV 5/11/21 whether or not pt could discontinue med. Recent imaging shows no DVT but it looked like there needed to be a consult from otology? Please refill as you see fit.

## 2021-08-30 RX ORDER — GLIMEPIRIDE 4 MG/1
TABLET ORAL
Qty: 200 TABLET | Refills: 1 | Status: SHIPPED | OUTPATIENT
Start: 2021-08-30 | End: 2022-05-02

## 2021-08-30 RX ORDER — LISINOPRIL 2.5 MG/1
TABLET ORAL
Qty: 100 TABLET | Refills: 1 | Status: SHIPPED | OUTPATIENT
Start: 2021-08-30 | End: 2022-03-14

## 2021-08-30 NOTE — TELEPHONE ENCOUNTER
Patient has recently been seen by PCP within the last 6 months per protocol (5/21). Will refill medications for 6 months.  Lab Results   Component Value Date/Time    HBA1C 6.8 (H) 05/12/2021 09:53 AM      Lab Results   Component Value Date/Time    MALBCRT 24 05/12/2021 09:53 AM    MICROALBUR 4.4 05/12/2021 09:53 AM      Lab Results   Component Value Date/Time    ALKPHOSPHAT 55 05/26/2020 09:06 AM    ASTSGOT 25 05/26/2020 09:06 AM    ALTSGPT 17 05/12/2021 09:53 AM    TBILIRUBIN 0.8 05/26/2020 09:06 AM

## 2021-09-15 RX ORDER — LANCETS 30 GAUGE
EACH MISCELLANEOUS
Qty: 200 EACH | Refills: 6 | Status: SHIPPED | OUTPATIENT
Start: 2021-09-15 | End: 2024-02-08 | Stop reason: SDUPTHER

## 2021-09-15 RX ORDER — GLUCOSAMINE HCL/CHONDROITIN SU 500-400 MG
CAPSULE ORAL
Qty: 200 EACH | Refills: 6 | Status: SHIPPED | OUTPATIENT
Start: 2021-09-15

## 2021-09-15 NOTE — TELEPHONE ENCOUNTER
Received request via: Patient    Was the patient seen in the last year in this department? Yes    Does the patient have an active prescription (recently filled or refills available) for medication(s) requested? No      Pt states on vm they have been trying to get test strips since July through pharmacy.  No record.  Please send test strips with refills.  Thank you.

## 2021-10-06 ENCOUNTER — HOSPITAL ENCOUNTER (OUTPATIENT)
Dept: RADIOLOGY | Facility: MEDICAL CENTER | Age: 71
End: 2021-10-06
Payer: MEDICARE

## 2021-10-14 NOTE — PROGRESS NOTES
Interventional Oncology Consultation      Re: Ravindra Palma     MRN: [id number]   : 1950    Ravindra Palma was referred by Aleksander Nash MD. He is a 71 y.o. male seen in clinic for evaluation and possible intervention of a 2.3cm T1a LEFT renal mass. He is also under the care of Eliecer Peterson MD, and Antony Alonzo MD.    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 Imaging screening for underlying malignancy revealed an incidental posterior exophytic left renal mass.    He has discussed surgical options and is now presenting to the Interventional Oncology Service for review of imaging studies and discussion of diagnostic and therapeutic options for the renal mass. The lesion has not been biopsied. Additional clinical history Eliquis therapy for the DVT, diabetes mellitus type II, hyperlipidemia, and osteoarthritis limiting his mobility. He denies complaints related to chest pain and dyspnea. He denies problems with anesthesia in the past, most recently with cervical spine surgery in . Mr. Palma has discussed holding his Eliquis for elective procedures with his hematologist already. 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 He is accompanied by his wife Jessy to today's consultation.    Past Medical History:   Diagnosis Date   • Deep vein thrombosis (HCC) [I82.409] 2016   • Dyslipidemia 2013   • Former smoker 2016   • High cholesterol    • Hypothyroidism 2013   • Low vitamin D level 5/3/2018   • Obesity (BMI 30-39.9) 5/3/2018   • Type II or unspecified type diabetes mellitus without mention of complication, uncontrolled 2013     Past Surgical History:   Procedure Laterality Date   • CERVICAL DISK AND FUSION ANTERIOR  2013    Performed by Fred Post M.D. at SURGERY [facility]   • CERVICAL  DECOMPRESSION POSTERIOR  2013    Performed by Fred Post M.D. at SURGERY [facility]     Social History     Socioeconomic History   • Marital status:      Spouse name: Not on file   • Number of children: Not on file   • Years of education: Not on file   • Highest education level: Not on file   Occupational History   • Not on file   Tobacco Use   • Smoking status: Former Smoker     Years: 40.00     Quit date: 2007     Years since quittin.7   • Smokeless tobacco: Never Used   • Tobacco comment: off and on smoker   Vaping Use   • Vaping Use: Never used   Substance and Sexual Activity   • Alcohol use: No     Alcohol/week: 0.0 oz   • Drug use: No   • Sexual activity: Yes     Partners: Female   Other Topics Concern   • Not on file   Social History Narrative    Retired; takes cares of horses on his property      Social Determinants of Health     Financial Resource Strain:    • Difficulty of Paying Living Expenses:    Food Insecurity:    • Worried About Running Out of Food in the Last Year:    • Ran Out of Food in the Last Year:    Transportation Needs:    • Lack of Transportation (Medical):    • Lack of Transportation (Non-Medical):    Physical Activity:    • Days of Exercise per Week:    • Minutes of Exercise per Session:    Stress:    • Feeling of Stress :    Social Connections:    • Frequency of Communication with Friends and Family:    • Frequency of Social Gatherings with Friends and Family:    • Attends Temple Services:    • Active Member of Clubs or Organizations:    • Attends Club or Organization Meetings:    • Marital Status:    Intimate Partner Violence:    • Fear of Current or Ex-Partner:    • Emotionally Abused:    • Physically Abused:    • Sexually Abused:      Family History   Problem Relation Age of Onset   • Stroke Mother    • Cancer Father         brain        Review of Systems   Constitutional: Negative.  Negative for chills, diaphoresis, fever, malaise/fatigue and weight  loss.   Respiratory: Negative.    Cardiovascular: Negative.    Genitourinary: Negative.    Musculoskeletal: Positive for joint pain.   Skin: Negative.    Neurological: Negative for sensory change, speech change, focal weakness and weakness.   Endo/Heme/Allergies: Does not bruise/bleed easily.   Psychiatric/Behavioral: Negative for substance abuse.       A comprehensive 14-point review of systems was negative except as described above.     Labs:    Ref. Range 5/12/2021 09:53 5/12/2021 09:54   WBC Latest Ref Range: 4.8 - 10.8 K/uL 5.8    RBC Latest Ref Range: 4.70 - 6.10 M/uL 4.90    Hemoglobin Latest Ref Range: 14.0 - 18.0 g/dL 15.0    Hematocrit Latest Ref Range: 42.0 - 52.0 % 45.5    MCV Latest Ref Range: 81.4 - 97.8 fL 92.9    MCH Latest Ref Range: 27.0 - 33.0 pg 30.6    MCHC Latest Ref Range: 33.7 - 35.3 g/dL 33.0 (L)    RDW Latest Ref Range: 35.9 - 50.0 fL 47.2    Platelet Count Latest Ref Range: 164 - 446 K/uL 228    MPV Latest Ref Range: 9.0 - 12.9 fL 11.6    Sodium Latest Ref Range: 135 - 145 mmol/L 138    Potassium Latest Ref Range: 3.6 - 5.5 mmol/L 4.4    Chloride Latest Ref Range: 96 - 112 mmol/L 103    Co2 Latest Ref Range: 20 - 33 mmol/L 26    Anion Gap Latest Ref Range: 7.0 - 16.0  9.0    Glucose Latest Ref Range: 65 - 99 mg/dL 168 (H)    Bun Latest Ref Range: 8 - 22 mg/dL 21    Creatinine Latest Ref Range: 0.50 - 1.40 mg/dL 0.92    GFR If  Latest Ref Range: >60 mL/min/1.73 m 2 >60    GFR If Non  Latest Ref Range: >60 mL/min/1.73 m 2 >60    Calcium Latest Ref Range: 8.5 - 10.5 mg/dL 9.5    ALT(SGPT) Latest Ref Range: 2 - 50 U/L 17    Glycohemoglobin Latest Ref Range: 4.0 - 5.6 % 6.8 (H)    Estim. Avg Glu Latest Units: mg/dL 148    Fasting Status Unknown Fasting    Cholesterol,Tot Latest Ref Range: 100 - 199 mg/dL 149    Triglycerides Latest Ref Range: 0 - 149 mg/dL 123    HDL Latest Ref Range: >=40 mg/dL 45    LDL Latest Ref Range: <100 mg/dL 79    Micro Alb Creat Ratio  Latest Ref Range: 0 - 30 mg/g 24    Creatinine, Urine Latest Units: mg/dL 185.31    Microalbumin, Urine Random Latest Units: mg/dL 4.4    Protein C Functional Latest Ref Range: 83 - 168 %  194 (H)   Protein S-Functional Latest Ref Range: 66 - 143 %  127   V Leiden Factor Unknown Negative        Pathology:  None    Radiology:   [facility]:      Current Outpatient Medications   Medication Sig Dispense Refill   • Blood Glucose Meter Kit Freestyle Freedom Lite.  Check blood sugar  1x per day and prn ssx of high or low sugar 1 Kit 0   • Blood Glucose Test Strips FREESTYLE Sugar Tree LITE . Check blood sugar 1x per day and prn ssx of high or low sugar 100 Strip 5   • Lancets Per insurance coverage. Check blood sugar 1x per day and prn ssx of high or low sugar 200 Each 6   • Alcohol Swabs Per insurance coverage. Wipe site with prep pad prior to injection 200 Each 6   • glimepiride (AMARYL) 4 MG Tab TAKE 1 TABLET BY MOUTH TWICE A  Tablet 1   • lisinopril (PRINIVIL) 2.5 MG Tab TAKE 1 TABLET BY MOUTH EVERY  Tablet 1   • XARELTO 20 MG Tab tablet TAKE 1 TAB BY MOUTH WITH DINNER. 90 Tablet 1   • vitamin D, Ergocalciferol, (DRISDOL) 1.25 MG (96028 UT) Cap capsule TAKE 1 CAPSULE BY MOUTH EVERY 7 DAYS 12 capsule 1   • metformin (GLUCOPHAGE) 1000 MG tablet TAKE 1 TABLET BY MOUTH TWICE A DAY WITH MEALS 200 tablet 0   • levothyroxine (SYNTHROID) 112 MCG Tab Take 1 tablet by mouth every morning on an empty stomach. 90 tablet 3   • glucose blood strip 1 Strip by Other route as needed. 100 Strip 3   • simvastatin (ZOCOR) 20 MG Tab Take 1 tablet by mouth every day. N THE EVENING 90 tablet 2   • pioglitazone (ACTOS) 15 MG Tab Take 1 tablet by mouth every day. 100 tablet 1   • furosemide (LASIX) 20 MG Tab TAKE 1 TAB BY MOUTH 1 TIME DAILY AS NEEDED (LEG SWELLING). 90 tablet 1   • gabapentin (NEURONTIN) 600 MG tablet TAKE 1/2 TABLET IN THE MORNING AND FULL TABLET AT BEDTIME 135 Tab 1   • Lancets Lancets order: Lancets  for One Touch Ultra 2 meter. Sig: use BID and prn ssx high or low sugar. 200 Each 3   • cyclobenzaprine (FLEXERIL) 5 MG tablet Take 1 Tab by mouth 2 times a day as needed for Moderate Pain for up to 30 doses. 30 Tab 0   • albuterol 108 (90 BASE) MCG/ACT Aero Soln inhalation aerosol Inhale 2 Puffs by mouth every 6 hours as needed for Shortness of Breath. 8.5 g 3     No current facility-administered medications for this visit.       Allergies   Allergen Reactions   • Nkda [No Known Drug Allergy]        Physical Exam  Constitutional:       General: He is not in acute distress.     Appearance: He is not diaphoretic.   HENT:      Head: Normocephalic.   Eyes:      General: No scleral icterus.  Pulmonary:      Effort: Pulmonary effort is normal. No respiratory distress.   Abdominal:      General: There is no distension.   Musculoskeletal:      Right lower leg: No edema.      Left lower leg: No edema.   Skin:     General: Skin is warm and dry.      Coloration: Skin is not jaundiced or pale.      Findings: No erythema or rash.   Neurological:      Mental Status: He is alert and oriented to person, place, and time. He is not disoriented.      Cranial Nerves: No cranial nerve deficit or facial asymmetry.      Sensory: Sensation is intact.      Motor: No weakness or tremor.      Coordination: Coordination normal.      Gait: Gait abnormal (ambulates with cane).   Psychiatric:         Mood and Affect: Mood and affect normal.         Behavior: Behavior normal.         Thought Content: Thought content normal.         Cognition and Memory: Memory normal.         Judgment: Judgment normal.       Impression:   1. T1a LEFT renal mass, 2.3 cm, amenable to percutaneous cryoablation.  2. Deep venous thrombosis, on Eliquis.  3. Hypertension.  4. Hyperlipidemia.  5. Hypothyroid.   6. Diabetes mellitus.  7. Osteoarthritis.    Plan:   Patient presenting with a LEFT renal mass. We have personally reviewed history and imaging studies, examine  the patient and discussed treatment options. As discussed with the patient, there is an 80% chance this is a renal cell carcinoma, which are almost always very slow growing and rarely cause significant morbidity until they approach 4 cm in size. The remainder of these lesions are mostly oncocytomas.     We discussed options including watchful waiting with serial imaging, image guided biopsy, image guided cryoablation with and without biopsy, and nephron sparing surgery or nephrectomy. Percutaneous biopsy and cryoablation may be accomplished separately or on the same procedure date. We discussed the possibility of non-diagnosis despite a possible biopsy attempt. If we proceed to cryoablation without definitive diagnostic tissue sampling the followup regimen will proceed as if we had a cancer diagnosis. We discussed the risks and benefits of each approach. Specific to biopsy and cryoablation we discussed the risks of bleeding, infection, damage to the kidney or its collecting system, damage to adjacent organs or nerve injury, tract seeding, skin injury, pneumothorax, reaction to medications given during the procedure, and potential hospitalization following the procedure.     After consideration and discussion,  decided to proceed with biopsy and cryoablation in the same setting as it is probable that this renal mass is a renal cell carcinoma and the patient wants to minimize the number of procedures. If technically feasible during the procedure the lesion will be biopsied. We will plan on performing the procedure an outpatient basis with the assistance of the anesthesia service for pain control and also to facilitate respiratory suspension for lesion targeting.  will return to clinic approximately 12 weeks following the procedure with followup imaging and further discussion. If there is residual or recurrent disease we will come up with a surveillance or retreatment plan at the time of the  followup visit. The patient has been scheduled for November 18. Written procedure instructions were provided to the patient. We will obtain clearance to hold his Eliquis for 2 days prior to the procedure.    Interventional Radiology   [facility]   [id number]  [place] [address]  [phone]

## 2021-10-15 ENCOUNTER — HOSPITAL ENCOUNTER (OUTPATIENT)
Dept: RADIOLOGY | Facility: MEDICAL CENTER | Age: 71
End: 2021-10-15
Attending: UROLOGY
Payer: MEDICARE

## 2021-10-15 DIAGNOSIS — N28.89 OTHER SPECIFIED DISORDERS OF KIDNEY AND URETER: ICD-10-CM

## 2021-10-15 ASSESSMENT — ENCOUNTER SYMPTOMS
RESPIRATORY NEGATIVE: 1
WEIGHT LOSS: 0
CONSTITUTIONAL NEGATIVE: 1
DIAPHORESIS: 0
CARDIOVASCULAR NEGATIVE: 1
WEAKNESS: 0
BRUISES/BLEEDS EASILY: 0
FEVER: 0
CHILLS: 0
FOCAL WEAKNESS: 0
SPEECH CHANGE: 0
SENSORY CHANGE: 0

## 2021-10-15 ASSESSMENT — LIFESTYLE VARIABLES: SUBSTANCE_ABUSE: 0

## 2021-10-17 DIAGNOSIS — E11.9 TYPE 2 DIABETES MELLITUS WITHOUT COMPLICATION, WITHOUT LONG-TERM CURRENT USE OF INSULIN (HCC): ICD-10-CM

## 2021-10-18 DIAGNOSIS — E11.9 TYPE 2 DIABETES MELLITUS WITHOUT COMPLICATION, WITHOUT LONG-TERM CURRENT USE OF INSULIN (HCC): ICD-10-CM

## 2021-10-18 RX ORDER — PIOGLITAZONEHYDROCHLORIDE 15 MG/1
15 TABLET ORAL
Qty: 100 TABLET | Refills: 1 | Status: SHIPPED | OUTPATIENT
Start: 2021-10-18 | End: 2022-05-17

## 2021-10-19 ENCOUNTER — PRE-ADMISSION TESTING (OUTPATIENT)
Dept: ADMISSIONS | Facility: MEDICAL CENTER | Age: 71
End: 2021-10-19
Attending: STUDENT IN AN ORGANIZED HEALTH CARE EDUCATION/TRAINING PROGRAM
Payer: MEDICARE

## 2021-10-20 RX ORDER — PIOGLITAZONEHYDROCHLORIDE 15 MG/1
15 TABLET ORAL
Qty: 100 TABLET | Refills: 1 | Status: ON HOLD | OUTPATIENT
Start: 2021-10-20 | End: 2021-11-18

## 2021-11-11 ENCOUNTER — PRE-ADMISSION TESTING (OUTPATIENT)
Dept: ADMISSIONS | Facility: MEDICAL CENTER | Age: 71
End: 2021-11-11
Attending: STUDENT IN AN ORGANIZED HEALTH CARE EDUCATION/TRAINING PROGRAM
Payer: MEDICARE

## 2021-11-11 DIAGNOSIS — Z01.812 PRE-OPERATIVE LABORATORY EXAMINATION: ICD-10-CM

## 2021-11-11 DIAGNOSIS — Z01.810 PRE-OPERATIVE CARDIOVASCULAR EXAMINATION: ICD-10-CM

## 2021-11-11 LAB
ANION GAP SERPL CALC-SCNC: 12 MMOL/L (ref 7–16)
BUN SERPL-MCNC: 18 MG/DL (ref 8–22)
CALCIUM SERPL-MCNC: 9.2 MG/DL (ref 8.5–10.5)
CHLORIDE SERPL-SCNC: 104 MMOL/L (ref 96–112)
CO2 SERPL-SCNC: 24 MMOL/L (ref 20–33)
CREAT SERPL-MCNC: 0.82 MG/DL (ref 0.5–1.4)
EKG IMPRESSION: NORMAL
ERYTHROCYTE [DISTWIDTH] IN BLOOD BY AUTOMATED COUNT: 46.1 FL (ref 35.9–50)
EST. AVERAGE GLUCOSE BLD GHB EST-MCNC: 140 MG/DL
GLUCOSE SERPL-MCNC: 218 MG/DL (ref 65–99)
HBA1C MFR BLD: 6.5 % (ref 4–5.6)
HCT VFR BLD AUTO: 44.5 % (ref 42–52)
HGB BLD-MCNC: 14.9 G/DL (ref 14–18)
INR PPP: 1.06 (ref 0.87–1.13)
MCH RBC QN AUTO: 30.7 PG (ref 27–33)
MCHC RBC AUTO-ENTMCNC: 33.5 G/DL (ref 33.7–35.3)
MCV RBC AUTO: 91.8 FL (ref 81.4–97.8)
PLATELET # BLD AUTO: 209 K/UL (ref 164–446)
PMV BLD AUTO: 10.5 FL (ref 9–12.9)
POTASSIUM SERPL-SCNC: 4.3 MMOL/L (ref 3.6–5.5)
PROTHROMBIN TIME: 13.5 SEC (ref 12–14.6)
RBC # BLD AUTO: 4.85 M/UL (ref 4.7–6.1)
SARS-COV-2 RNA RESP QL NAA+PROBE: NOTDETECTED
SODIUM SERPL-SCNC: 140 MMOL/L (ref 135–145)
SPECIMEN SOURCE: NORMAL
WBC # BLD AUTO: 6.1 K/UL (ref 4.8–10.8)

## 2021-11-11 PROCEDURE — 80048 BASIC METABOLIC PNL TOTAL CA: CPT

## 2021-11-11 PROCEDURE — 85027 COMPLETE CBC AUTOMATED: CPT

## 2021-11-11 PROCEDURE — 93010 ELECTROCARDIOGRAM REPORT: CPT | Performed by: INTERNAL MEDICINE

## 2021-11-11 PROCEDURE — 36415 COLL VENOUS BLD VENIPUNCTURE: CPT

## 2021-11-11 PROCEDURE — 93005 ELECTROCARDIOGRAM TRACING: CPT

## 2021-11-11 PROCEDURE — U0005 INFEC AGEN DETEC AMPLI PROBE: HCPCS

## 2021-11-11 PROCEDURE — 85610 PROTHROMBIN TIME: CPT

## 2021-11-11 PROCEDURE — U0003 INFECTIOUS AGENT DETECTION BY NUCLEIC ACID (DNA OR RNA); SEVERE ACUTE RESPIRATORY SYNDROME CORONAVIRUS 2 (SARS-COV-2) (CORONAVIRUS DISEASE [COVID-19]), AMPLIFIED PROBE TECHNIQUE, MAKING USE OF HIGH THROUGHPUT TECHNOLOGIES AS DESCRIBED BY CMS-2020-01-R: HCPCS

## 2021-11-11 PROCEDURE — 83036 HEMOGLOBIN GLYCOSYLATED A1C: CPT

## 2021-11-11 PROCEDURE — C9803 HOPD COVID-19 SPEC COLLECT: HCPCS

## 2021-11-18 ENCOUNTER — ANESTHESIA (OUTPATIENT)
Dept: RADIOLOGY | Facility: MEDICAL CENTER | Age: 71
End: 2021-11-18
Payer: MEDICARE

## 2021-11-18 ENCOUNTER — APPOINTMENT (OUTPATIENT)
Dept: RADIOLOGY | Facility: MEDICAL CENTER | Age: 71
End: 2021-11-18
Attending: STUDENT IN AN ORGANIZED HEALTH CARE EDUCATION/TRAINING PROGRAM
Payer: MEDICARE

## 2021-11-18 ENCOUNTER — ANESTHESIA EVENT (OUTPATIENT)
Dept: RADIOLOGY | Facility: MEDICAL CENTER | Age: 71
End: 2021-11-18
Payer: MEDICARE

## 2021-11-18 ENCOUNTER — HOSPITAL ENCOUNTER (OUTPATIENT)
Facility: MEDICAL CENTER | Age: 71
End: 2021-11-18
Attending: STUDENT IN AN ORGANIZED HEALTH CARE EDUCATION/TRAINING PROGRAM | Admitting: STUDENT IN AN ORGANIZED HEALTH CARE EDUCATION/TRAINING PROGRAM
Payer: MEDICARE

## 2021-11-18 VITALS
HEIGHT: 67 IN | DIASTOLIC BLOOD PRESSURE: 73 MMHG | SYSTOLIC BLOOD PRESSURE: 158 MMHG | HEART RATE: 65 BPM | TEMPERATURE: 96.9 F | BODY MASS INDEX: 36.57 KG/M2 | OXYGEN SATURATION: 94 % | RESPIRATION RATE: 18 BRPM | WEIGHT: 233.03 LBS

## 2021-11-18 DIAGNOSIS — N28.89 LEFT RENAL MASS: ICD-10-CM

## 2021-11-18 LAB
EKG IMPRESSION: NORMAL
GLUCOSE BLD-MCNC: 188 MG/DL (ref 65–99)

## 2021-11-18 PROCEDURE — 82962 GLUCOSE BLOOD TEST: CPT

## 2021-11-18 PROCEDURE — 77012 CT SCAN FOR NEEDLE BIOPSY: CPT | Mod: XU,LT

## 2021-11-18 PROCEDURE — 700111 HCHG RX REV CODE 636 W/ 250 OVERRIDE (IP): Performed by: STUDENT IN AN ORGANIZED HEALTH CARE EDUCATION/TRAINING PROGRAM

## 2021-11-18 PROCEDURE — 700111 HCHG RX REV CODE 636 W/ 250 OVERRIDE (IP): Performed by: ANESTHESIOLOGY

## 2021-11-18 PROCEDURE — 700101 HCHG RX REV CODE 250: Performed by: ANESTHESIOLOGY

## 2021-11-18 PROCEDURE — 93010 ELECTROCARDIOGRAM REPORT: CPT | Performed by: INTERNAL MEDICINE

## 2021-11-18 PROCEDURE — 4410588 CT-CRYOABLATION RENAL TUMOR UNILATERAL

## 2021-11-18 PROCEDURE — 93005 ELECTROCARDIOGRAM TRACING: CPT | Performed by: STUDENT IN AN ORGANIZED HEALTH CARE EDUCATION/TRAINING PROGRAM

## 2021-11-18 PROCEDURE — 700101 HCHG RX REV CODE 250: Performed by: STUDENT IN AN ORGANIZED HEALTH CARE EDUCATION/TRAINING PROGRAM

## 2021-11-18 PROCEDURE — 160002 HCHG RECOVERY MINUTES (STAT)

## 2021-11-18 RX ORDER — ONDANSETRON 2 MG/ML
4 INJECTION INTRAMUSCULAR; INTRAVENOUS
Status: DISCONTINUED | OUTPATIENT
Start: 2021-11-18 | End: 2021-11-18 | Stop reason: HOSPADM

## 2021-11-18 RX ORDER — SODIUM CHLORIDE, SODIUM LACTATE, POTASSIUM CHLORIDE, CALCIUM CHLORIDE 600; 310; 30; 20 MG/100ML; MG/100ML; MG/100ML; MG/100ML
INJECTION, SOLUTION INTRAVENOUS CONTINUOUS
Status: DISCONTINUED | OUTPATIENT
Start: 2021-11-18 | End: 2021-11-18 | Stop reason: HOSPADM

## 2021-11-18 RX ORDER — HYDROMORPHONE HYDROCHLORIDE 1 MG/ML
0.2 INJECTION, SOLUTION INTRAMUSCULAR; INTRAVENOUS; SUBCUTANEOUS
Status: DISCONTINUED | OUTPATIENT
Start: 2021-11-18 | End: 2021-11-18 | Stop reason: HOSPADM

## 2021-11-18 RX ORDER — HYDRALAZINE HYDROCHLORIDE 20 MG/ML
5 INJECTION INTRAMUSCULAR; INTRAVENOUS
Status: DISCONTINUED | OUTPATIENT
Start: 2021-11-18 | End: 2021-11-18 | Stop reason: HOSPADM

## 2021-11-18 RX ORDER — HYDROMORPHONE HYDROCHLORIDE 1 MG/ML
0.4 INJECTION, SOLUTION INTRAMUSCULAR; INTRAVENOUS; SUBCUTANEOUS
Status: DISCONTINUED | OUTPATIENT
Start: 2021-11-18 | End: 2021-11-18 | Stop reason: HOSPADM

## 2021-11-18 RX ORDER — HALOPERIDOL 5 MG/ML
1 INJECTION INTRAMUSCULAR
Status: DISCONTINUED | OUTPATIENT
Start: 2021-11-18 | End: 2021-11-18 | Stop reason: HOSPADM

## 2021-11-18 RX ORDER — CEFAZOLIN SODIUM 1 G/3ML
INJECTION, POWDER, FOR SOLUTION INTRAMUSCULAR; INTRAVENOUS PRN
Status: DISCONTINUED | OUTPATIENT
Start: 2021-11-18 | End: 2021-11-18 | Stop reason: SURG

## 2021-11-18 RX ORDER — HYDROMORPHONE HYDROCHLORIDE 1 MG/ML
0.1 INJECTION, SOLUTION INTRAMUSCULAR; INTRAVENOUS; SUBCUTANEOUS
Status: DISCONTINUED | OUTPATIENT
Start: 2021-11-18 | End: 2021-11-18 | Stop reason: HOSPADM

## 2021-11-18 RX ORDER — DIPHENHYDRAMINE HYDROCHLORIDE 50 MG/ML
12.5 INJECTION INTRAMUSCULAR; INTRAVENOUS
Status: DISCONTINUED | OUTPATIENT
Start: 2021-11-18 | End: 2021-11-18 | Stop reason: HOSPADM

## 2021-11-18 RX ORDER — OXYCODONE HCL 5 MG/5 ML
5 SOLUTION, ORAL ORAL
Status: DISCONTINUED | OUTPATIENT
Start: 2021-11-18 | End: 2021-11-18 | Stop reason: HOSPADM

## 2021-11-18 RX ORDER — OXYCODONE HYDROCHLORIDE 5 MG/1
5 TABLET ORAL EVERY 6 HOURS PRN
Qty: 20 TABLET | Refills: 0 | Status: SHIPPED | OUTPATIENT
Start: 2021-11-18 | End: 2021-11-23

## 2021-11-18 RX ORDER — MEPERIDINE HYDROCHLORIDE 25 MG/ML
6.25 INJECTION INTRAMUSCULAR; INTRAVENOUS; SUBCUTANEOUS
Status: DISCONTINUED | OUTPATIENT
Start: 2021-11-18 | End: 2021-11-18 | Stop reason: HOSPADM

## 2021-11-18 RX ORDER — OXYCODONE HCL 5 MG/5 ML
10 SOLUTION, ORAL ORAL
Status: DISCONTINUED | OUTPATIENT
Start: 2021-11-18 | End: 2021-11-18 | Stop reason: HOSPADM

## 2021-11-18 RX ORDER — SODIUM CHLORIDE, SODIUM LACTATE, POTASSIUM CHLORIDE, CALCIUM CHLORIDE 600; 310; 30; 20 MG/100ML; MG/100ML; MG/100ML; MG/100ML
INJECTION, SOLUTION INTRAVENOUS CONTINUOUS
Status: ACTIVE | OUTPATIENT
Start: 2021-11-18 | End: 2021-11-18

## 2021-11-18 RX ADMIN — FENTANYL CITRATE 50 MCG: 50 INJECTION, SOLUTION INTRAMUSCULAR; INTRAVENOUS at 10:59

## 2021-11-18 RX ADMIN — ROCURONIUM BROMIDE 50 MG: 10 INJECTION, SOLUTION INTRAVENOUS at 08:43

## 2021-11-18 RX ADMIN — LIDOCAINE HYDROCHLORIDE 0.5 ML: 10 INJECTION, SOLUTION EPIDURAL; INFILTRATION; INTRACAUDAL; PERINEURAL at 07:34

## 2021-11-18 RX ADMIN — FENTANYL CITRATE 50 MCG: 50 INJECTION, SOLUTION INTRAMUSCULAR; INTRAVENOUS at 10:51

## 2021-11-18 RX ADMIN — PROPOFOL 200 MG: 10 INJECTION, EMULSION INTRAVENOUS at 08:43

## 2021-11-18 RX ADMIN — HYDRALAZINE HYDROCHLORIDE 5 MG: 20 INJECTION INTRAMUSCULAR; INTRAVENOUS at 11:42

## 2021-11-18 RX ADMIN — Medication 100 MG: at 08:43

## 2021-11-18 RX ADMIN — CEFAZOLIN 2 G: 330 INJECTION, POWDER, FOR SOLUTION INTRAMUSCULAR; INTRAVENOUS at 08:44

## 2021-11-18 ASSESSMENT — PAIN DESCRIPTION - PAIN TYPE: TYPE: ACUTE PAIN

## 2021-11-18 ASSESSMENT — FIBROSIS 4 INDEX: FIB4 SCORE: 2.12

## 2021-11-18 NOTE — OR SURGEON
Immediate Post- Operative Note        Findings: left RCC      Procedure(s): Left RCC cryoablation      Estimated Blood Loss: Less than 5 ml        Complications: None            11/18/2021     10:36 AM     Robbie Varela M.D.

## 2021-11-18 NOTE — DISCHARGE INSTRUCTIONS
ACTIVITY: Rest and take it easy for the first 24 hours.  A responsible adult is recommended to remain with you during that time.  It is normal to feel sleepy.  We encourage you to not do anything that requires balance, judgment or coordination.    MILD FLU-LIKE SYMPTOMS ARE NORMAL. YOU MAY EXPERIENCE GENERALIZED MUSCLE ACHES, THROAT IRRITATION, HEADACHE AND/OR SOME NAUSEA.    FOR 24 HOURS DO NOT:  Drive, operate machinery or run household appliances.  Drink beer or alcoholic beverages.   Make important decisions or sign legal documents.    SPECIAL INSTRUCTIONS:   Cryoablation, Care After  This sheet gives you information about how to care for yourself after your procedure. Your health care provider may also give you more specific instructions. If you have problems or questions, contact your health care provider.  What can I expect after the procedure?  After the procedure, it is common to have:  · Soreness around the treatment area.  · Mild pain and swelling in the treatment area.  Follow these instructions at home:  Treatment area care    · Follow instructions from your health care provider about how to take care of your incision. Make sure you:  ? Wash your hands with soap and water before you change your bandage (dressing). If soap and water are not available, use hand .  ? Change your dressing as told by your health care provider.  ? Leave stitches (sutures) in place. They may need to stay in place for 2 weeks or longer.  · Check your treatment area every day for signs of infection. Check for:  ? More redness, swelling, or pain.  ? More fluid or blood.  ? Warmth.  ? Pus or a bad smell.  · Keep the treated area clean, dry, and covered with a dressing until it has healed. Clean the area with soap and water or as told by your health care provider.  · You may shower if your health care provider approves. If your bandage gets wet, change it right away.  Activity  · Follow instructions from your health care  provider about any activity limitations.  · Do not drive for 24 hours if you received a medicine to help you relax (sedative).  General instructions  · Take over-the-counter and prescription medicines only as told by your health care provider.  · Keep all follow-up visits as told by your health care provider. This is important.  Contact a health care provider if:  · You do not have a bowel movement for 2 days.  · You have nausea or vomiting.  · You have more redness, swelling, or pain around your treatment area.  · You have more fluid or blood coming from your treatment area.  · Your treatment area feels warm to the touch.  · You have pus or a bad smell coming from your treatment area.  · You have a fever.  Get help right away if:  · You have severe pain.  · You have trouble swallowing or breathing.  · You have severe weakness or dizziness.  · You have chest pain or shortness of breath.  This information is not intended to replace advice given to you by your health care provider. Make sure you discuss any questions you have with your health care provider.        DIET: To avoid nausea, slowly advance diet as tolerated, avoiding spicy or greasy foods for the first day.  Add more substantial food to your diet according to your physician's instructions.  Babies can be fed formula or breast milk as soon as they are hungry.  INCREASE FLUIDS AND FIBER TO AVOID CONSTIPATION.    SURGICAL DRESSING/BATHING: You may remove dressing in 24 hours, but do not submerge in water for 7 days.     FOLLOW-UP APPOINTMENT:  A follow-up appointment should be arranged with your doctor, call to schedule.    You should CALL YOUR PHYSICIAN if you develop:  Fever greater than 101 degrees F.  Pain not relieved by medication, or persistent nausea or vomiting.  Excessive bleeding (blood soaking through dressing) or unexpected drainage from the wound.  Extreme redness or swelling around the incision site, drainage of pus or foul smelling  drainage.  Inability to urinate or empty your bladder within 8 hours.  Problems with breathing or chest pain.    You should call 911 if you develop problems with breathing or chest pain.  If you are unable to contact your doctor or surgical center, you should go to the nearest emergency room or urgent care center.  Physician's telephone #: [phone]    If any questions arise, call your doctor.  If your doctor is not available, please feel free to call the Surgical Center at [phone]. The Contact Center is open Monday through Friday 7AM to 5PM and may speak to a nurse at [phone], or toll free at [phone].     A registered nurse may call you a few days after your surgery to see how you are doing after your procedure.    MEDICATIONS: Resume taking daily medication.  Take prescribed pain medication with food.  If no medication is prescribed, you may take non-aspirin pain medication if needed.  PAIN MEDICATION CAN BE VERY CONSTIPATING.  Take a stool softener or laxative such as senokot, pericolace, or milk of magnesia if needed.     Last pain medication given at 10:59 am.    If your physician has prescribed pain medication that includes Acetaminophen (Tylenol), do not take additional Acetaminophen (Tylenol) while taking the prescribed medication.    Depression / Suicide Risk    As you are discharged from this [facility] facility, it is important to learn how to keep safe from harming yourself.    Recognize the warning signs:  · Abrupt changes in personality, positive or negative- including increase in energy   · Giving away possessions  · Change in eating patterns- significant weight changes-  positive or negative  · Change in sleeping patterns- unable to sleep or sleeping all the time   · Unwillingness or inability to communicate  · Depression  · Unusual sadness, discouragement and loneliness  · Talk of wanting to die  · Neglect of personal appearance   · Rebelliousness- reckless  behavior  · Withdrawal from people/activities they love  · Confusion- inability to concentrate     If you or a loved one observes any of these behaviors or has concerns about self-harm, here's what you can do:  · Talk about it- your feelings and reasons for harming yourself  · Remove any means that you might use to hurt yourself (examples: pills, rope, extension cords, firearm)  · Get professional help from the community (Mental Health, Substance Abuse, psychological counseling)  · Do not be alone:Call your Safe Contact- someone whom you trust who will be there for you.  · Call your local CRISIS HOTLINE 124-0014 or 701-560-8024  · Call your local Children's Mobile Crisis Response Team [place] (975) 994-9295 or www.Thumbplay  · Call the toll free National Suicide Prevention Hotlines   · National Suicide Prevention Lifeline 521-813-UHYM (3402)  · National Hope Line Network 800-SUICIDE (774-9313)

## 2021-11-18 NOTE — PROGRESS NOTES
CT NOTE: L renal cryoablation performed (L renal mass) by Dr. Varela under general anesthesia w/ Dr. Jimenez. Pt tolerated well, will recover in PACU.     Post-procedure recovery orders to be placed by Dr. Varela and released by PACU RN.

## 2021-11-18 NOTE — ANESTHESIA TIME REPORT
Anesthesia Start and Stop Event Times     Date Time Event    11/18/2021 0833 Ready for Procedure     0833 Anesthesia Start     1200 Anesthesia Stop        Responsible Staff  11/18/21    Name Role Begin End    Charbel Jimenez M.D. Anesth 0833 1200        Preop Diagnosis (Free Text):  Pre-op Diagnosis             Preop Diagnosis (Codes):    Premium Reason  Non-Premium    Comments:

## 2021-11-18 NOTE — Clinical Note
Pt placed on monitor and intubated by anesthesia. Pt transferred on to CT table in prone position, safety measures in place.

## 2021-11-18 NOTE — OR NURSING
Patient received from OR at 1024, bedside report received from anesthesia/OR RN, 2 patient identifiers confirmed . Patient connected to monitors, assessed for general head to toe and pain/nausea. Ordered reviewed and checked. Spouse updated via telephone on patient status.  At this time patient meets criteria for D/C to phase II/room. VSS, awake and appropriate, pain minimal or at a tolerable level per patient. Report called to Flor SALCEDO and patient taken to phase II to complete bedrest until 1224.

## 2021-11-18 NOTE — OR NURSING
Pt's VSS; denies N/V; states pain is at tolerable level. Dressing CDI to left flank. D/c orders received. IV dc'd. Pt changed into clothing with assistance. Discharge instructions given; pt and family verbalized understanding and questions answered. Patient states ready to d/c home. No prescriptions given, send electronically to pt pharmacy. Pt dc'd in w/c with RN in stable condition.

## 2021-11-18 NOTE — Clinical Note
General anesthesia case. All vitals/meds/monitoring to be done by anesthesia. See anesthesia flowsheet.

## 2021-11-18 NOTE — ANESTHESIA PREPROCEDURE EVALUATION
Date/Time: 11/18/21 0800    Scheduled providers: Robbie Varela M.D.; Charbel Jimenez M.D.    Procedure: CT-CRYOABLATION RENAL TUMOR UNILATERAL    Diagnosis:       Left renal mass [N28.89]      Other specified disorders of kidney and ureter [N28.89]    Indications:       left renal mass       cryoablation    Location: [facility] IMAG[facility] - INTERVENTIONAL - [facility]          Relevant Problems   CARDIAC   (positive) Chronic deep vein thrombosis (DVT) (HCC)   (positive) Hypertension      ENDO   (positive) Hypothyroidism       Physical Exam    Airway   Mallampati: II  TM distance: >3 FB  Neck ROM: full       Cardiovascular - normal exam  Rhythm: regular  Rate: normal  (-) murmur     Dental - normal exam           Pulmonary - normal exam  Breath sounds clear to auscultation     Abdominal    Neurological - normal exam                 Anesthesia Plan    ASA 2       Plan - general       Airway plan will be ETT          Induction: intravenous    Postoperative Plan: Postoperative administration of opioids is intended.    Pertinent diagnostic labs and testing reviewed    Informed Consent:    Anesthetic plan and risks discussed with patient.    Use of blood products discussed with: patient whom consented to blood products.

## 2021-11-18 NOTE — OR NURSING
Dr Varela paged regarding patient elevated BP and bradycardia. New orders received and carried out. Please see MAR.

## 2021-11-19 NOTE — ANESTHESIA POSTPROCEDURE EVALUATION
Patient: Ravindra Palma    Procedure Summary     Date: 11/18/21 Room / Location: [facility] - INTERVENTIONAL - [facility] MEDICAL [facility]    Anesthesia Start: 0833 Anesthesia Stop: 1200    Procedure: CT-CRYOABLATION RENAL TUMOR UNILATERAL Diagnosis:       Left renal mass      Other specified disorders of kidney and ureter      (left renal mass )      (cryoablation)    Scheduled Providers: Robbie Varela M.D.; Charbel Jimenez M.D. Responsible Provider: Charbel Jimenez M.D.    Anesthesia Type: general ASA Status: 2          Final Anesthesia Type: general  Last vitals  BP   Blood Pressure : 158/73    Temp   36.1 °C (96.9 °F)    Pulse   65   Resp   18    SpO2   94 %      Anesthesia Post Evaluation    Patient location during evaluation: PACU  Patient participation: complete - patient participated  Level of consciousness: awake and alert    Airway patency: patent  Anesthetic complications: no  Cardiovascular status: hemodynamically stable  Respiratory status: acceptable  Hydration status: euvolemic    PONV: none          There were no known complications for this encounter.     Nurse Pain Score: 0 (NPRS)

## 2021-12-13 DIAGNOSIS — E78.5 HYPERLIPIDEMIA, UNSPECIFIED HYPERLIPIDEMIA TYPE: ICD-10-CM

## 2021-12-13 RX ORDER — SIMVASTATIN 20 MG
20 TABLET ORAL
Qty: 90 TABLET | Refills: 2 | Status: SHIPPED | OUTPATIENT
Start: 2021-12-13 | End: 2022-10-26

## 2021-12-20 NOTE — NON-PROVIDER
Outcome: Left Message Regarding Comprehensive Health Assessment     Please transfer to Patient Outreach Team at [phone] when patient returns call.    [facility] Verified: yes    Attempt # 3

## 2022-01-16 DIAGNOSIS — E55.9 VITAMIN D DEFICIENCY: ICD-10-CM

## 2022-03-12 ENCOUNTER — HOSPITAL ENCOUNTER (OUTPATIENT)
Dept: RADIOLOGY | Facility: MEDICAL CENTER | Age: 72
End: 2022-03-12
Attending: NURSE PRACTITIONER
Payer: MEDICARE

## 2022-03-12 DIAGNOSIS — N28.89 LEFT RENAL MASS: ICD-10-CM

## 2022-03-12 PROCEDURE — 74183 MRI ABD W/O CNTR FLWD CNTR: CPT

## 2022-03-12 PROCEDURE — 700117 HCHG RX CONTRAST REV CODE 255

## 2022-03-12 PROCEDURE — A9576 INJ PROHANCE MULTIPACK: HCPCS

## 2022-03-12 RX ADMIN — GADOTERIDOL 20 ML: 279.3 INJECTION, SOLUTION INTRAVENOUS at 14:42

## 2022-03-24 ENCOUNTER — APPOINTMENT (OUTPATIENT)
Dept: RADIOLOGY | Facility: MEDICAL CENTER | Age: 72
End: 2022-03-24
Attending: NURSE PRACTITIONER
Payer: MEDICARE

## 2022-03-29 ENCOUNTER — HOSPITAL ENCOUNTER (OUTPATIENT)
Dept: RADIOLOGY | Facility: MEDICAL CENTER | Age: 72
End: 2022-03-29
Attending: NURSE PRACTITIONER
Payer: MEDICARE

## 2022-03-29 DIAGNOSIS — N28.89 RENAL MASS, LEFT: ICD-10-CM

## 2022-03-29 NOTE — PROGRESS NOTES
Telephone Appointment Visit    This telephone visit was initiated by the patient and they verbally consented. Robbie Varela MD present for entirety of call.    Reason for Call:  imaging follow up    HPI:    Ravindra Palma was referred by Aleksander Nash MD. He is a 72 y.o. male seen in clinic for evaluation and after intervention of a 2.3cm T1a LEFT renal mass. He is also under the care of Eliecer Peterson MD, and Antony Alonzo MD.     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 Imaging screening for underlying malignancy revealed an incidental posterior exophytic left renal mass. It was determined to be a probably renal mass. He discussed surgical options and presented to the Interventional Oncology Service for review of imaging studies and discussion of diagnostic and therapeutic options for the renal mass. He underwent uncomplicated cryoablation with Robbie Varela MD on November 18, 2021. The lesion was been biopsied.     Additional clinical history Eliquis therapy for the DVT, diabetes mellitus type II, hyperlipidemia, and osteoarthritis limiting his mobility. He has no complaints today and is getting ready to feed his horse. 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 He is accompanied by his wife Jessy on the phone for today's consultation.    Labs / Images Reviewed:      Ref. Range 11/11/2021 10:58   WBC Latest Ref Range: 4.8 - 10.8 K/uL 6.1   RBC Latest Ref Range: 4.70 - 6.10 M/uL 4.85   Hemoglobin Latest Ref Range: 14.0 - 18.0 g/dL 14.9   Hematocrit Latest Ref Range: 42.0 - 52.0 % 44.5   MCV Latest Ref Range: 81.4 - 97.8 fL 91.8   MCH Latest Ref Range: 27.0 - 33.0 pg 30.7   MCHC Latest Ref Range: 33.7 - 35.3 g/dL 33.5 (L)   RDW Latest Ref Range: 35.9 - 50.0 fL 46.1   Platelet Count Latest Ref Range: 164 - 446 K/uL 209   MPV Latest Ref Range:  9.0 - 12.9 fL 10.5   Sodium Latest Ref Range: 135 - 145 mmol/L 140   Potassium Latest Ref Range: 3.6 - 5.5 mmol/L 4.3   Chloride Latest Ref Range: 96 - 112 mmol/L 104   Co2 Latest Ref Range: 20 - 33 mmol/L 24   Anion Gap Latest Ref Range: 7.0 - 16.0  12.0   Glucose Latest Ref Range: 65 - 99 mg/dL 218 (H)   Bun Latest Ref Range: 8 - 22 mg/dL 18   Creatinine Latest Ref Range: 0.50 - 1.40 mg/dL 0.82   GFR If  Latest Ref Range: >60 mL/min/1.73 m 2 >60   GFR If Non  Latest Ref Range: >60 mL/min/1.73 m 2 >60   Calcium Latest Ref Range: 8.5 - 10.5 mg/dL 9.2   Glycohemoglobin Latest Ref Range: 4.0 - 5.6 % 6.5 (H)   Estim. Avg Glu Latest Units: mg/dL 140   INR Latest Ref Range: 0.87 - 1.13  1.06   PT Latest Ref Range: 12.0 - 14.6 sec 13.5     Pathology:  None     Radiology:   MRI 3/12/22 at [facility]:  1.  This is the first follow-up imaging study following left renal cryoablation. There post is postablation changes with associated fat necrosis in the left midpole. No suspicious enhancement or nodularity to suggest residual tumor or recurrence. The renal vein and IVC are patent.  2.  No lymphadenopathy.  3.  Mild hepatic steatosis.    CT guided cryoablation 11/18/21 at [facility]:  CT-guided LEFT  renal mass cryoablation.      PLAN:  The patient was discharged without significant discomfort or nausea. The patient has a prescription for pain medicine. Plan followup imaging with MRI or CT and clinic visit in 3, 6, 12 and 24 months.  This was discussed with the patient.    CT CAP 7/28/21 at [facility]:      Assessment and Plan:     Impression:   1. T1a LEFT renal mass, 2.3 cm, status post percutaneous cryoablation, with no recurrence.  2. Deep venous thrombosis, on Eliquis.  3. Hypertension.  4. Hyperlipidemia.  5. Hypothyroid.   6. Diabetes mellitus.  7. Osteoarthritis.    Follow-up: MRI in 6 months (September 2022)    Total Call Time Spent (mins): 2 minutes    LYRIC rGoves.

## 2022-05-01 DIAGNOSIS — E11.9 TYPE 2 DIABETES MELLITUS WITHOUT COMPLICATION, WITHOUT LONG-TERM CURRENT USE OF INSULIN (HCC): ICD-10-CM

## 2022-05-02 DIAGNOSIS — E11.9 TYPE 2 DIABETES MELLITUS WITHOUT COMPLICATION, WITHOUT LONG-TERM CURRENT USE OF INSULIN (HCC): ICD-10-CM

## 2022-05-02 RX ORDER — GLIMEPIRIDE 4 MG/1
TABLET ORAL
Qty: 60 TABLET | Refills: 1 | Status: SHIPPED | OUTPATIENT
Start: 2022-05-02 | End: 2022-05-04 | Stop reason: SDUPTHER

## 2022-05-04 DIAGNOSIS — E11.9 TYPE 2 DIABETES MELLITUS WITHOUT COMPLICATION, WITHOUT LONG-TERM CURRENT USE OF INSULIN (HCC): ICD-10-CM

## 2022-05-04 RX ORDER — GLIMEPIRIDE 4 MG/1
4 TABLET ORAL 2 TIMES DAILY
Qty: 200 TABLET | Refills: 0 | Status: SHIPPED | OUTPATIENT
Start: 2022-05-04 | End: 2022-11-30

## 2022-05-04 NOTE — TELEPHONE ENCOUNTER
Received request via: Pharmacy    Was the patient seen in the last year in this department? Yes    Does the patient have an active prescription (recently filled or refills available) for medication(s) requested? Yes. Insurance pays 100 day supply

## 2022-05-13 DIAGNOSIS — E11.42 DIABETIC POLYNEUROPATHY ASSOCIATED WITH TYPE 2 DIABETES MELLITUS (HCC): ICD-10-CM

## 2022-05-13 RX ORDER — BLOOD-GLUCOSE METER
KIT MISCELLANEOUS
Qty: 100 STRIP | Refills: 5 | Status: SHIPPED | OUTPATIENT
Start: 2022-05-13 | End: 2023-05-19

## 2022-05-17 DIAGNOSIS — E11.9 TYPE 2 DIABETES MELLITUS WITHOUT COMPLICATION, WITHOUT LONG-TERM CURRENT USE OF INSULIN (HCC): ICD-10-CM

## 2022-05-17 RX ORDER — PIOGLITAZONEHYDROCHLORIDE 15 MG/1
15 TABLET ORAL
Qty: 100 TABLET | Refills: 1 | Status: SHIPPED | OUTPATIENT
Start: 2022-05-17 | End: 2022-12-02

## 2022-06-09 DIAGNOSIS — I82.511 CHRONIC DEEP VEIN THROMBOSIS (DVT) OF FEMORAL VEIN OF RIGHT LOWER EXTREMITY (HCC): ICD-10-CM

## 2022-06-09 RX ORDER — RIVAROXABAN 20 MG/1
TABLET, FILM COATED ORAL
Qty: 30 TABLET | Refills: 5 | Status: SHIPPED | OUTPATIENT
Start: 2022-06-09 | End: 2023-04-25

## 2022-06-09 NOTE — TELEPHONE ENCOUNTER
Received request via: Pharmacy    Was the patient seen in the last year in this department? No 05/11/2021    Does the patient have an active prescription (recently filled or refills available) for medication(s) requested? No

## 2022-08-09 RX ORDER — LEVOTHYROXINE SODIUM 112 UG/1
TABLET ORAL
Qty: 30 TABLET | Refills: 0 | Status: SHIPPED | OUTPATIENT
Start: 2022-08-09 | End: 2022-08-15

## 2022-10-04 NOTE — ASSESSMENT & PLAN NOTE
Chronic condition.  The patient is now taking simvastatin.  No side effect reported.   ER Physician History and Physical    HPI:    Due to COVID-19 pandemic, I was wearing a mask during the entire encounter with this patient.    68-year-old female with past history as below presents with confusion and diarrhea.  Son aiding in history.  Patient got her flu shot last week Thursday or Friday, afterwards was more confused and son says the right side of her face was drooping and her right arm and leg were weak.  The symptoms have now improved but she still is a little confused and then was complaining of having diarrhea.  Feels like her abdomen is bloated and having watery diarrhea.  Denies any discharge or bleeding from the vagina, no hematuria or dysuria, no fevers or chills or vomiting.    Review of Systems:  General: Negative except as noted above in HPI  Skin: Negative except as noted above in HPI  Eyes: Negative except as noted above in HPI  ENT: Negative except as noted above in HPI  Neck: Negative except as noted above in HPI  Respiratory: Negative except as noted above in HPI  Cardiac: Negative except as noted above in HPI  Gastrointestinal: Negative except as noted above in HPI  Urinary: Negative except as noted above in HPI  Musculoskeletal: Negative except as noted above in HPI  Neurologic: Negative except as noted above in HPI  All other systems reviewed and negative      Allergies   Allergen Reactions   • Doxycycline Hyclate GI UPSET   • Codeine Other (See Comments)   • Egg White   (Food Or Med) HIVES   • Enalapril Maleate Other (See Comments)   • Lac Bovis HIVES   • Latex Other (See Comments)     \"rash from elastic\"   • Opioid Analgesics HIVES     confusion   • Ace Inhibitors Cough       Past Medical History:   Diagnosis Date   • Acute renal failure (ARF) (CMS/[facility])    • Asthma    • Class 2 obesity due to excess calories without serious comorbidity with body mass index (BMI) of 39.0 to 39.9 in adult    • Essential hypertension, benign    • Failed moderate sedation during procedure     woke up  during colonoscopy   • Gastro-esophageal reflux disease without esophagitis    • GERD (gastroesophageal reflux disease)    • Hypocalcemia    • Incisional hernia, without obstruction or gangrene    • Moderate persistent asthma without complication    • Obesity (BMI 30-39.9)    • Obstructive sleep apnea of adult     uses cpap    • Raynaud's disease without gangrene    • Sleep disturbance    • Stage 3a chronic kidney disease (CMS/HCC) 10/19/2021       Past Surgical History:   Procedure Laterality Date   • APPENDECTOMY  1983   • CHOLECYSTECTOMY  1983   • COLECTOMY  2005   • COLONOSCOPY  04/29/2021   • ESOPHAGOGASTRODUODENOSCOPY  09/12/2016   • HERNIA REPAIR  1988/2006   • KNEE SURGERY     • PARTIAL HYSTERECTOMY  1987   • TONSILLECTOMY     • TUBAL LIGATION  1983       Family History   Problem Relation Age of Onset   • Coronary Artery Disease Mother    • Diabetes Mother    • Coronary Artery Disease Father    • Diabetes Father    • Heart disease Other        Social History     Tobacco Use   • Smoking status: Never Smoker   • Smokeless tobacco: Never Used   Vaping Use   • Vaping Use: never used   Substance Use Topics   • Alcohol use: Never     Comment: social   • Drug use: No       Physical Exam:  Patient Vitals for the past 24 hrs:   BP Temp Temp src Pulse Resp SpO2 Height Weight   10/04/22 2117 (!) 140/66 98.1 °F (36.7 °C) Oral (!) 53 17 96 % -- --   10/04/22 1553 (!) 156/80 98.1 °F (36.7 °C) Oral 72 -- 99 % -- --   10/04/22 1430 (!) 152/71 -- -- 63 13 99 % -- --   10/04/22 1400 (!) 155/81 -- -- 66 16 98 % -- --   10/04/22 1330 138/75 -- -- 65 13 97 % -- --   10/04/22 1300 (!) 162/80 -- -- 70 14 97 % -- --   10/04/22 1230 (!) 160/82 -- -- 76 12 96 % -- --   10/04/22 1106 135/84 98.3 °F (36.8 °C) -- 75 18 97 % 5' 2\" (1.575 m) 88.4 kg (194 lb 14.2 oz)       Pulse Ox is 97% on Room Air which is normal for this patient    General Appearance: AAOx3, NAD  Skin: No rash, no bruising  HEENT: Normocephalic/atraumatic, sclera  anicteric, no facial swelling  Neck: Normal range of motion, no meningismus  Chest and Lungs: Clear to auscultation bilaterally, no wheezing, rales, or rhonchi  Cardiovascular: Regular rate, regular rhythm, no murmus  Abdomen: Soft, non-tender, nondistended, no rebound or guarding  Back: No midline tenderness, no CVA tenderness  Musculoskeletal: No edema or tenderness  Rectal: Erythema and irritation in the perianal region, no fluctuance or tenderness, no blood or pain on rectal exam, no hard impacted stool  Neuro: Alert, extra-ocular movements intact bilaterally, PERRL, no facial droop, no slurred speech, 5/5 strength bilateral upper and lower extremities, sensation normal throughout  Psychiatric: Appropriate, cooperative    Medical Decision Making:  Multiple diagnoses considered.  Patient here with 2 separate complaints, first seems to be the right-sided facial droop and arm and leg weakness and confusion since last Friday or so, improving now there is no significant weakness on exam but she is a little confused.  Also having the diarrhea and feeling bloated, no abdominal tenderness on exam.  Patient afebrile with normal vital signs.  Labs obtained, show hypokalemia and hypomagnesemia, otherwise generally unremarkable.  CT brain without contrast is concerning for subacute infarct left thalamus which would correspond to patient's symptoms.  CT abdomen pelvis no acute process.  Case discussed with neurology Dr. Watters, will admit for stroke work-up, gave aspirin, ordered CTA head and neck as well as MRI brain as routine.  Discussed with hospitalist for admission.  Electrolytes were repleted.      Hi, new admission in the ED:   MRN: [id number]  Molly Taylor is a 68 year old female in ED room 82760/1.   Pt presented with confusion and right-sided weakness few days ago which has now resolved but CT brain shows subacute infarct, admit for stroke work-up.  Level of care: inpatient  Consultants: neuro   Please call me at  [phone] with any questions. Thank you.           Clinical Impression:  ED Diagnosis   1. Acute CVA (cerebrovascular accident) (CMS/HCC)     2. Diarrhea, unspecified type     3. Hypokalemia     4. Hypomagnesemia           Admit 10/4/2022  1:07 PM  Telemetry Bed?: Yes  Admitting Physician: JOHNATHAN RASCON [[id number]]  Is this a telephone or verbal order?: This is a telephone order from the admitting physician      Joo Bray MD   10/4/2022 11:10 AM      Joo Bray MD  10/04/22 2157

## 2022-10-25 ENCOUNTER — HOSPITAL ENCOUNTER (OUTPATIENT)
Dept: LAB | Facility: MEDICAL CENTER | Age: 72
End: 2022-10-25
Attending: INTERNAL MEDICINE
Payer: MEDICARE

## 2022-10-25 ENCOUNTER — OFFICE VISIT (OUTPATIENT)
Dept: MEDICAL GROUP | Facility: PHYSICIAN GROUP | Age: 72
End: 2022-10-25
Payer: MEDICARE

## 2022-10-25 VITALS
BODY MASS INDEX: 34.84 KG/M2 | RESPIRATION RATE: 16 BRPM | TEMPERATURE: 97.8 F | DIASTOLIC BLOOD PRESSURE: 72 MMHG | SYSTOLIC BLOOD PRESSURE: 118 MMHG | OXYGEN SATURATION: 99 % | HEART RATE: 68 BPM | HEIGHT: 67 IN | WEIGHT: 222 LBS

## 2022-10-25 DIAGNOSIS — I15.2 HYPERTENSION ASSOCIATED WITH DIABETES (HCC): ICD-10-CM

## 2022-10-25 DIAGNOSIS — E03.9 ACQUIRED HYPOTHYROIDISM: ICD-10-CM

## 2022-10-25 DIAGNOSIS — E11.40 DIABETIC NEUROPATHY ASSOCIATED WITH TYPE 2 DIABETES MELLITUS (HCC): ICD-10-CM

## 2022-10-25 DIAGNOSIS — Z23 NEED FOR VACCINATION: ICD-10-CM

## 2022-10-25 DIAGNOSIS — E11.69 DYSLIPIDEMIA ASSOCIATED WITH TYPE 2 DIABETES MELLITUS (HCC): ICD-10-CM

## 2022-10-25 DIAGNOSIS — E78.5 DYSLIPIDEMIA ASSOCIATED WITH TYPE 2 DIABETES MELLITUS (HCC): ICD-10-CM

## 2022-10-25 DIAGNOSIS — N28.89 LEFT KIDNEY MASS: ICD-10-CM

## 2022-10-25 DIAGNOSIS — I82.5Y9 CHRONIC DEEP VEIN THROMBOSIS (DVT) OF PROXIMAL VEIN OF LOWER EXTREMITY, UNSPECIFIED LATERALITY (HCC): ICD-10-CM

## 2022-10-25 DIAGNOSIS — Z12.11 COLON CANCER SCREENING: ICD-10-CM

## 2022-10-25 DIAGNOSIS — E55.9 VITAMIN D DEFICIENCY: ICD-10-CM

## 2022-10-25 DIAGNOSIS — L21.9 SEBORRHEIC DERMATITIS: ICD-10-CM

## 2022-10-25 DIAGNOSIS — Z12.5 SCREENING FOR MALIGNANT NEOPLASM OF PROSTATE: ICD-10-CM

## 2022-10-25 DIAGNOSIS — E11.59 HYPERTENSION ASSOCIATED WITH DIABETES (HCC): ICD-10-CM

## 2022-10-25 LAB
25(OH)D3 SERPL-MCNC: 41 NG/ML (ref 30–100)
ALT SERPL-CCNC: 16 U/L (ref 2–50)
ANION GAP SERPL CALC-SCNC: 10 MMOL/L (ref 7–16)
BUN SERPL-MCNC: 24 MG/DL (ref 8–22)
CALCIUM SERPL-MCNC: 9.7 MG/DL (ref 8.5–10.5)
CHLORIDE SERPL-SCNC: 105 MMOL/L (ref 96–112)
CHOLEST SERPL-MCNC: 132 MG/DL (ref 100–199)
CO2 SERPL-SCNC: 25 MMOL/L (ref 20–33)
CREAT SERPL-MCNC: 0.87 MG/DL (ref 0.5–1.4)
ERYTHROCYTE [DISTWIDTH] IN BLOOD BY AUTOMATED COUNT: 45.7 FL (ref 35.9–50)
GFR SERPLBLD CREATININE-BSD FMLA CKD-EPI: 90 ML/MIN/1.73 M 2
GLUCOSE SERPL-MCNC: 153 MG/DL (ref 65–99)
HBA1C MFR BLD: 7 % (ref 0–5.6)
HCT VFR BLD AUTO: 45.3 % (ref 42–52)
HDLC SERPL-MCNC: 48 MG/DL
HGB BLD-MCNC: 15.2 G/DL (ref 14–18)
INT CON NEG: ABNORMAL
INT CON POS: ABNORMAL
LDLC SERPL CALC-MCNC: 62 MG/DL
MCH RBC QN AUTO: 30.9 PG (ref 27–33)
MCHC RBC AUTO-ENTMCNC: 33.6 G/DL (ref 33.7–35.3)
MCV RBC AUTO: 92.1 FL (ref 81.4–97.8)
PLATELET # BLD AUTO: 246 K/UL (ref 164–446)
PMV BLD AUTO: 10.6 FL (ref 9–12.9)
POTASSIUM SERPL-SCNC: 4.4 MMOL/L (ref 3.6–5.5)
PSA SERPL-MCNC: 1.89 NG/ML (ref 0–4)
RBC # BLD AUTO: 4.92 M/UL (ref 4.7–6.1)
SODIUM SERPL-SCNC: 140 MMOL/L (ref 135–145)
TRIGL SERPL-MCNC: 112 MG/DL (ref 0–149)
TSH SERPL DL<=0.005 MIU/L-ACNC: 1.76 UIU/ML (ref 0.38–5.33)
WBC # BLD AUTO: 6.7 K/UL (ref 4.8–10.8)

## 2022-10-25 PROCEDURE — 92250 FUNDUS PHOTOGRAPHY W/I&R: CPT | Performed by: INTERNAL MEDICINE

## 2022-10-25 PROCEDURE — 82043 UR ALBUMIN QUANTITATIVE: CPT

## 2022-10-25 PROCEDURE — 85027 COMPLETE CBC AUTOMATED: CPT

## 2022-10-25 PROCEDURE — 80048 BASIC METABOLIC PNL TOTAL CA: CPT

## 2022-10-25 PROCEDURE — 84153 ASSAY OF PSA TOTAL: CPT

## 2022-10-25 PROCEDURE — 90662 IIV NO PRSV INCREASED AG IM: CPT | Performed by: INTERNAL MEDICINE

## 2022-10-25 PROCEDURE — 82306 VITAMIN D 25 HYDROXY: CPT

## 2022-10-25 PROCEDURE — 82570 ASSAY OF URINE CREATININE: CPT

## 2022-10-25 PROCEDURE — 84460 ALANINE AMINO (ALT) (SGPT): CPT

## 2022-10-25 PROCEDURE — 99215 OFFICE O/P EST HI 40 MIN: CPT | Mod: 25 | Performed by: INTERNAL MEDICINE

## 2022-10-25 PROCEDURE — 84443 ASSAY THYROID STIM HORMONE: CPT

## 2022-10-25 PROCEDURE — 80061 LIPID PANEL: CPT

## 2022-10-25 PROCEDURE — 36415 COLL VENOUS BLD VENIPUNCTURE: CPT

## 2022-10-25 PROCEDURE — 83036 HEMOGLOBIN GLYCOSYLATED A1C: CPT | Performed by: INTERNAL MEDICINE

## 2022-10-25 PROCEDURE — G0008 ADMIN INFLUENZA VIRUS VAC: HCPCS | Performed by: INTERNAL MEDICINE

## 2022-10-25 RX ORDER — FLUOCINONIDE TOPICAL SOLUTION USP, 0.05% 0.5 MG/ML
SOLUTION TOPICAL
Qty: 60 ML | Refills: 3 | Status: SHIPPED | OUTPATIENT
Start: 2022-10-25

## 2022-10-25 RX ORDER — LANCETS 30 GAUGE
EACH MISCELLANEOUS
Qty: 200 EACH | Refills: 6 | Status: SHIPPED | OUTPATIENT
Start: 2022-10-25 | End: 2023-04-25

## 2022-10-25 ASSESSMENT — PATIENT HEALTH QUESTIONNAIRE - PHQ9: CLINICAL INTERPRETATION OF PHQ2 SCORE: 0

## 2022-10-25 NOTE — ASSESSMENT & PLAN NOTE
This is a chronic condition affected the scalp and the right-sided parietal region.  He has been using various topical products over-the-counter without improvement.

## 2022-10-25 NOTE — ASSESSMENT & PLAN NOTE
This is a chronic condition.  The patient is presently taking metformin Actos and glimepiride.  The patient denies significant hypoglycemic symptoms.  A1c in the office today is 7%.

## 2022-10-25 NOTE — PROGRESS NOTES
PRIMARY CARE CLINIC VISIT    Chief complaint:    Follow-up diabetes hypertension  Prescription refills  Flu shot  Scalp irritation  Lab test request    History of Present Illness     Left kidney mass  Patient is status post cryoablation therapy October 2021 performed by interventional radiologist.  The patient is scheduled to repeat imaging of the kidney November 10, 2022.  Advised the patient to be sure to keep the appointment.  Patient denies fever chills dysuria or hematuria.    Chronic deep vein thrombosis (DVT) (HCC)  Chronic condition.  The patient is presently taking Xarelto.  The patient denies any history of bleeding.    Diabetic neuropathy associated with type 2 diabetes mellitus (HCC)  This is a chronic condition.  The patient is presently taking metformin Actos and glimepiride.  The patient denies significant hypoglycemic symptoms.  A1c in the office today is 7%.    Hypothyroidism  This is a chronic condition.  The patient is regularly taking levothyroxine.  Blood tests ordered for follow-up.    Dyslipidemia associated with type 2 diabetes mellitus (HCC)  Chronic condition.  The patient is being treated with simvastatin.  The patient is due for lab test.    Hypertension associated with diabetes (HCC)  Chronic condition.  Patient is now taking lisinopril daily.  His blood pressure has been well controlled at home.  No significant side effects reported.    Seborrheic dermatitis  This is a chronic condition affected the scalp and the right-sided parietal region.  He has been using various topical products over-the-counter without improvement.    Current Outpatient Medications on File Prior to Visit   Medication Sig Dispense Refill    lisinopril (PRINIVIL) 2.5 MG Tab TAKE 1 TABLET BY MOUTH EVERY DAY  DAYS. 30 Tablet 0    levothyroxine (SYNTHROID) 112 MCG Tab TAKE 1 TABLET BY MOUTH EVERY DAY IN THE MORNING ON AN EMPTY STOMACH 30 Tablet 0    vitamin D2, Ergocalciferol, (DRISDOL) 1.25 MG (84735 UT) Cap  "capsule TAKE 1 CAPSULE BY MOUTH ONE TIME PER WEEK 12 Capsule 0    metformin (GLUCOPHAGE) 1000 MG tablet TAKE 1 TABLET BY MOUTH TWICE A DAY WITH MEALS 200 Tablet 0    XARELTO 20 MG Tab tablet TAKE 1 TAB BY MOUTH WITH DINNER. 30 Tablet 5    pioglitazone (ACTOS) 15 MG Tab TAKE 1 TABLET BY MOUTH EVERY  Tablet 1    glimepiride (AMARYL) 4 MG Tab Take 1 Tablet by mouth 2 times a day. 200 Tablet 0    simvastatin (ZOCOR) 20 MG Tab Take 1 Tablet by mouth every day. N THE EVENING 90 Tablet 2    glucose blood (FREESTYLE LITE) strip CHECK BLOOD SUGAR 1X PER DAY AND AS NEEDED FOR SIGN AND SYMPTOMS OF HIGH OR LOW SUGAR 100 Strip 5    Blood Glucose Meter Kit Freestyle Freedom Lite.  Check blood sugar  1x per day and prn ssx of high or low sugar 1 Kit 0    Lancets Per insurance coverage. Check blood sugar 1x per day and prn ssx of high or low sugar 200 Each 6    Alcohol Swabs Per insurance coverage. Wipe site with prep pad prior to injection 200 Each 6    glucose blood strip 1 Strip by Other route as needed. 100 Strip 3     No current facility-administered medications on file prior to visit.        Allergies: Nkda [no known drug allergy]    ROS  As per HPI above. All other systems reviewed and negative.      Past Medical, Social, and Family history reviewed and updated in EPIC     Objective     /72 (BP Location: Left arm, Patient Position: Sitting, BP Cuff Size: Adult)   Pulse 68   Temp 36.6 °C (97.8 °F) (Temporal)   Resp 16   Ht 1.702 m (5' 7\")   Wt 101 kg (222 lb)   SpO2 99%    Body mass index is 34.77 kg/m².    General: alert in no apparent distress.  Cardiovascular: regular rate and rhythm  Pulmonary: lungs : no wheezing   Gastrointestinal: BS present. No obvious mass noted  Scalp show dry scaly erythematous changes right parietal region.  No blister formation or discharge or fluctuance noted.    Monofilament testing with a 10 gram force: sensation intact: decreased bilaterally  Visual Inspection: Feet without " maceration, ulcers, fissures.  Pedal pulses: decreased bilaterally o      Assessment and Plan     1. Diabetic neuropathy associated with type 2 diabetes mellitus (HCC)  - POCT Retinal Eye Exam  - POCT A1C  - Basic Metabolic Panel; Future  Chronic condition.  Excellent control with A1c 7% as above.  Continue current management.  A1c goal of 7% discussed.  Pt's education:   -Advised the  benefits of blood glucose monitoring, potential hypoglycemia , medication mode of action/ possible side effects, the effects of exercise, potential acute and chronic conditions related to diabetes.   -Discussed with pt regarding healthy diet and making better choices to help blood sugar.  -Also encouraged pt to continue with regular exercises/walking.       2. Chronic deep vein thrombosis (DVT) of proximal vein of lower extremity, unspecified laterality (HCC)  - CBC WITHOUT DIFFERENTIAL; Future  This is a chronic condition.  Advised the patient to continue with Xarelto.  Monitor for any signs of bleeding.  CBC ordered.    3. Left kidney mass  Patient is status post cryoablation therapy in October 2021.  Advised the patient to follow-up with interventional radiology/keep the appointment for imaging scheduled for November 10, 2022.    4. Acquired hypothyroidism  Continue levothyroxine.  - TSH; Future    5. Dyslipidemia associated with type 2 diabetes mellitus (HCC)  - ALANINE AMINO-TRANS; Future  - Lipid Profile; Future  Chronic condition    continue with simvastatin  6. Hypertension associated with diabetes (HCC)  - MICROALBUMIN CREAT RATIO URINE; Future  Chronic stable condition.  Continue with lisinopril.  7. Seborrheic dermatitis  This is a chronic condition.  - fluocinonide (LIDEX) 0.05 % external solution; Apply to affected areas of scalp BID  Dispense: 60 mL; Refill: 3      8. Need for vaccination  - INFLUENZA VACCINE, HIGH DOSE (65+ ONLY)    9. Colon cancer screening  - COLOGUARD (FIT DNA)    10. Vitamin D deficiency  Chronic  condition.  Lab tests ordered.    11. Screening for malignant neoplasm of prostate  - PROSTATE SPECIFIC AG SCREENING; Future    Other orders  - Lancets; Per insurance coverage. Check blood sugar 1x per day and prn ssx of high or low sugar  Dispense: 200 Each; Refill: 6      Recommend follow-up approximately 4 months            Total time:  46  min -  That includes time for chart review before the visit, the actual patient visit, and time spent on documentation after the visit.  Chart review/prep, review of other providers' records, imaging/lab review, face-to-face time for history/examination, ordering, prescribing,  review of results/meds/ treatment plan with patient, documentation in EMR, care coordination.       Please note that this dictation was created using voice recognition software. I have made every reasonable attempt to correct obvious errors, but I expect that there are errors of grammar and possibly content that I did not discover before finalizing the note.    Antony Alonzo MD  Internal Medicine  [facility]

## 2022-10-25 NOTE — PROGRESS NOTES
Annual Health Assessment Questions:    1.  Are you currently engaging in any exercise or physical activity? No    2.  How would you describe your mood or emotional well-being today? good    3.  Have you had any falls in the last year? Yes    4.  Have you noticed any problems with your balance or had difficulty walking? No    5.  In the last six months have you experienced any leakage of urine? No    6. DPA/Advanced Directive: Patient does not have an Advance Directive.  A packet and workshop information was given on Advance Directives.

## 2022-10-25 NOTE — ASSESSMENT & PLAN NOTE
Chronic condition.  The patient is presently taking Xarelto.  The patient denies any history of bleeding.

## 2022-10-25 NOTE — ASSESSMENT & PLAN NOTE
Chronic condition.  Patient is now taking lisinopril daily.  His blood pressure has been well controlled at home.  No significant side effects reported.

## 2022-10-25 NOTE — ASSESSMENT & PLAN NOTE
Chronic condition.  The patient is being treated with simvastatin.  The patient is due for lab test.

## 2022-10-25 NOTE — ASSESSMENT & PLAN NOTE
Patient is status post cryoablation therapy October 2021 performed by interventional radiologist.  The patient is scheduled to repeat imaging of the kidney November 10, 2022.  Advised the patient to be sure to keep the appointment.  Patient denies fever chills dysuria or hematuria.

## 2022-10-25 NOTE — ASSESSMENT & PLAN NOTE
This is a chronic condition.  The patient is regularly taking levothyroxine.  Blood tests ordered for follow-up.

## 2022-10-26 ENCOUNTER — TELEPHONE (OUTPATIENT)
Dept: MEDICAL GROUP | Facility: PHYSICIAN GROUP | Age: 72
End: 2022-10-26
Payer: MEDICARE

## 2022-10-26 DIAGNOSIS — E78.5 HYPERLIPIDEMIA, UNSPECIFIED HYPERLIPIDEMIA TYPE: ICD-10-CM

## 2022-10-26 LAB
CREAT UR-MCNC: 229.33 MG/DL
MICROALBUMIN UR-MCNC: 3.6 MG/DL
MICROALBUMIN/CREAT UR: 16 MG/G (ref 0–30)
RETINAL SCREEN: NEGATIVE

## 2022-10-26 RX ORDER — SIMVASTATIN 20 MG
TABLET ORAL
Qty: 100 TABLET | Refills: 2 | Status: SHIPPED | OUTPATIENT
Start: 2022-10-26 | End: 2023-08-02

## 2022-10-26 NOTE — TELEPHONE ENCOUNTER
Phone Number Called: [phone] (home)       Call outcome: Spoke to patient regarding message below.    Message: Patients wife Jessy notified

## 2022-10-26 NOTE — TELEPHONE ENCOUNTER
----- Message from Antony Alonzo M.D. sent at 10/25/2022  5:35 PM PDT -----  Please notify the patient.  Labs back    Anemia test, kidney function test, electrolytes, liver enzyme, vitamin D, PSA prostate test, thyroid test: Within normal limits    Cholesterol 132 normal

## 2022-10-27 NOTE — TELEPHONE ENCOUNTER
----- Message from Antony Alonzo M.D. sent at 10/26/2022  2:24 PM PDT -----    Pls notify pt      Retinal scan: good news, no evidence of diabetic retinopathy.    Please repeat the retinal scan in 1year

## 2022-11-09 ENCOUNTER — DOCUMENTATION (OUTPATIENT)
Dept: HEALTH INFORMATION MANAGEMENT | Facility: OTHER | Age: 72
End: 2022-11-09
Payer: MEDICARE

## 2022-11-10 ENCOUNTER — HOSPITAL ENCOUNTER (OUTPATIENT)
Dept: RADIOLOGY | Facility: MEDICAL CENTER | Age: 72
End: 2022-11-10
Attending: NURSE PRACTITIONER
Payer: MEDICARE

## 2022-11-10 DIAGNOSIS — C64.2 RENAL CANCER, LEFT (HCC): ICD-10-CM

## 2022-11-10 PROCEDURE — 700117 HCHG RX CONTRAST REV CODE 255

## 2022-11-10 PROCEDURE — 74183 MRI ABD W/O CNTR FLWD CNTR: CPT

## 2022-11-10 PROCEDURE — A9576 INJ PROHANCE MULTIPACK: HCPCS

## 2022-11-10 RX ADMIN — GADOTERIDOL 20 ML: 279.3 INJECTION, SOLUTION INTRAVENOUS at 13:41

## 2022-11-19 DIAGNOSIS — E11.9 TYPE 2 DIABETES MELLITUS WITHOUT COMPLICATION, WITHOUT LONG-TERM CURRENT USE OF INSULIN (HCC): ICD-10-CM

## 2022-11-21 RX ORDER — LISINOPRIL 2.5 MG/1
TABLET ORAL
Qty: 30 TABLET | Refills: 0 | Status: SHIPPED | OUTPATIENT
Start: 2022-11-21 | End: 2022-12-05

## 2022-11-21 NOTE — TELEPHONE ENCOUNTER
Received request via: Pharmacy    Was the patient seen in the last year in this department? Yes    Does the patient have an active prescription (recently filled or refills available) for medication(s) requested? No    Does the patient have retirement Plus and need 100 day supply (blood pressure, diabetes and cholesterol meds only)? Medication is not for cholesterol, blood pressure or diabetes

## 2022-11-23 RX ORDER — LEVOTHYROXINE SODIUM 112 UG/1
TABLET ORAL
Qty: 100 TABLET | Refills: 0 | Status: SHIPPED | OUTPATIENT
Start: 2022-11-23 | End: 2023-03-07

## 2022-11-23 NOTE — TELEPHONE ENCOUNTER
Received request via: Pharmacy    Was the patient seen in the last year in this department? Yes    Does the patient have an active prescription (recently filled or refills available) for medication(s) requested? No    Does the patient have senior living Plus and need 100 day supply (blood pressure, diabetes and cholesterol meds only)? Yes, quantity updated to 100 days

## 2022-11-30 DIAGNOSIS — E11.9 TYPE 2 DIABETES MELLITUS WITHOUT COMPLICATION, WITHOUT LONG-TERM CURRENT USE OF INSULIN (HCC): ICD-10-CM

## 2022-11-30 RX ORDER — GLIMEPIRIDE 4 MG/1
TABLET ORAL
Qty: 200 TABLET | Refills: 0 | Status: SHIPPED | OUTPATIENT
Start: 2022-11-30 | End: 2023-03-07

## 2022-12-01 NOTE — TELEPHONE ENCOUNTER
Received request via: Pharmacy    Was the patient seen in the last year in this department? Yes    Does the patient have an active prescription (recently filled or refills available) for medication(s) requested? No    Does the patient have nursing home Plus and need 100 day supply (blood pressure, diabetes and cholesterol meds only)? Yes, quantity updated to 100 days

## 2022-12-02 DIAGNOSIS — E11.9 TYPE 2 DIABETES MELLITUS WITHOUT COMPLICATION, WITHOUT LONG-TERM CURRENT USE OF INSULIN (HCC): ICD-10-CM

## 2022-12-02 RX ORDER — PIOGLITAZONEHYDROCHLORIDE 15 MG/1
15 TABLET ORAL
Qty: 100 TABLET | Refills: 1 | Status: SHIPPED | OUTPATIENT
Start: 2022-12-02 | End: 2023-05-22

## 2022-12-02 NOTE — TELEPHONE ENCOUNTER
Received request via: Pharmacy    Was the patient seen in the last year in this department? Yes    Does the patient have an active prescription (recently filled or refills available) for medication(s) requested? No    Does the patient have snf Plus and need 100 day supply (blood pressure, diabetes and cholesterol meds only)? Yes, quantity updated to 100 days

## 2022-12-05 DIAGNOSIS — E11.9 TYPE 2 DIABETES MELLITUS WITHOUT COMPLICATION, WITHOUT LONG-TERM CURRENT USE OF INSULIN (HCC): ICD-10-CM

## 2022-12-05 RX ORDER — LISINOPRIL 2.5 MG/1
TABLET ORAL
Qty: 100 TABLET | Refills: 1 | Status: SHIPPED | OUTPATIENT
Start: 2022-12-05 | End: 2023-05-22

## 2022-12-05 NOTE — TELEPHONE ENCOUNTER
Received request via: Pharmacy    Was the patient seen in the last year in this department? Yes    Does the patient have an active prescription (recently filled or refills available) for medication(s) requested? No    Does the patient have correction Plus and need 100 day supply (blood pressure, diabetes and cholesterol meds only)? Yes, quantity updated to 100 days

## 2022-12-30 DIAGNOSIS — R79.89 LOW VITAMIN D LEVEL: ICD-10-CM

## 2023-01-03 RX ORDER — ERGOCALCIFEROL 1.25 MG/1
CAPSULE ORAL
Qty: 12 CAPSULE | Refills: 0 | Status: SHIPPED | OUTPATIENT
Start: 2023-01-03 | End: 2023-04-25 | Stop reason: SDUPTHER

## 2023-03-07 DIAGNOSIS — E11.9 TYPE 2 DIABETES MELLITUS WITHOUT COMPLICATION, WITHOUT LONG-TERM CURRENT USE OF INSULIN (HCC): ICD-10-CM

## 2023-03-07 RX ORDER — LEVOTHYROXINE SODIUM 112 UG/1
TABLET ORAL
Qty: 100 TABLET | Refills: 0 | Status: SHIPPED | OUTPATIENT
Start: 2023-03-07 | End: 2023-05-22

## 2023-03-07 RX ORDER — GLIMEPIRIDE 4 MG/1
TABLET ORAL
Qty: 200 TABLET | Refills: 0 | Status: SHIPPED | OUTPATIENT
Start: 2023-03-07 | End: 2023-05-22

## 2023-03-07 NOTE — TELEPHONE ENCOUNTER
Received request via: Pharmacy    Was the patient seen in the last year in this department? Yes    Does the patient have an active prescription (recently filled or refills available) for medication(s) requested? No    Does the patient have long term Plus and need 100 day supply (blood pressure, diabetes and cholesterol meds only)? Medication is not for cholesterol, blood pressure or diabetes

## 2023-03-11 DIAGNOSIS — E11.9 TYPE 2 DIABETES MELLITUS WITHOUT COMPLICATION, WITHOUT LONG-TERM CURRENT USE OF INSULIN (HCC): ICD-10-CM

## 2023-04-25 ENCOUNTER — OFFICE VISIT (OUTPATIENT)
Dept: MEDICAL GROUP | Facility: PHYSICIAN GROUP | Age: 73
End: 2023-04-25
Payer: MEDICARE

## 2023-04-25 ENCOUNTER — HOSPITAL ENCOUNTER (OUTPATIENT)
Dept: LAB | Facility: MEDICAL CENTER | Age: 73
End: 2023-04-25
Attending: INTERNAL MEDICINE
Payer: MEDICARE

## 2023-04-25 VITALS
RESPIRATION RATE: 20 BRPM | OXYGEN SATURATION: 97 % | SYSTOLIC BLOOD PRESSURE: 124 MMHG | WEIGHT: 228.25 LBS | TEMPERATURE: 98 F | HEIGHT: 66 IN | BODY MASS INDEX: 36.68 KG/M2 | DIASTOLIC BLOOD PRESSURE: 70 MMHG | HEART RATE: 66 BPM

## 2023-04-25 DIAGNOSIS — I82.511 CHRONIC DEEP VEIN THROMBOSIS (DVT) OF FEMORAL VEIN OF RIGHT LOWER EXTREMITY (HCC): ICD-10-CM

## 2023-04-25 DIAGNOSIS — E66.01 SEVERE OBESITY (HCC): Chronic | ICD-10-CM

## 2023-04-25 DIAGNOSIS — E03.9 HYPOTHYROIDISM, UNSPECIFIED TYPE: Chronic | ICD-10-CM

## 2023-04-25 DIAGNOSIS — I15.2 HYPERTENSION ASSOCIATED WITH DIABETES (HCC): Chronic | ICD-10-CM

## 2023-04-25 DIAGNOSIS — E11.40 DIABETIC NEUROPATHY ASSOCIATED WITH TYPE 2 DIABETES MELLITUS (HCC): ICD-10-CM

## 2023-04-25 DIAGNOSIS — Z13.6 ENCOUNTER FOR ABDOMINAL AORTIC ANEURYSM (AAA) SCREENING: ICD-10-CM

## 2023-04-25 DIAGNOSIS — N28.89 LEFT KIDNEY MASS: Chronic | ICD-10-CM

## 2023-04-25 DIAGNOSIS — E11.69 DYSLIPIDEMIA ASSOCIATED WITH TYPE 2 DIABETES MELLITUS (HCC): Chronic | ICD-10-CM

## 2023-04-25 DIAGNOSIS — E78.5 DYSLIPIDEMIA ASSOCIATED WITH TYPE 2 DIABETES MELLITUS (HCC): Chronic | ICD-10-CM

## 2023-04-25 DIAGNOSIS — E11.59 HYPERTENSION ASSOCIATED WITH DIABETES (HCC): Chronic | ICD-10-CM

## 2023-04-25 DIAGNOSIS — E55.9 VITAMIN D DEFICIENCY: Chronic | ICD-10-CM

## 2023-04-25 DIAGNOSIS — R79.89 LOW VITAMIN D LEVEL: ICD-10-CM

## 2023-04-25 LAB
ALT SERPL-CCNC: 16 U/L (ref 2–50)
ANION GAP SERPL CALC-SCNC: 12 MMOL/L (ref 7–16)
BUN SERPL-MCNC: 22 MG/DL (ref 8–22)
CALCIUM SERPL-MCNC: 9.4 MG/DL (ref 8.5–10.5)
CHLORIDE SERPL-SCNC: 103 MMOL/L (ref 96–112)
CHOLEST SERPL-MCNC: 131 MG/DL (ref 100–199)
CO2 SERPL-SCNC: 25 MMOL/L (ref 20–33)
CREAT SERPL-MCNC: 0.87 MG/DL (ref 0.5–1.4)
CREAT UR-MCNC: 177.43 MG/DL
FASTING STATUS PATIENT QL REPORTED: NORMAL
GFR SERPLBLD CREATININE-BSD FMLA CKD-EPI: 90 ML/MIN/1.73 M 2
GLUCOSE SERPL-MCNC: 156 MG/DL (ref 65–99)
HBA1C MFR BLD: 6.9 % (ref ?–5.8)
HDLC SERPL-MCNC: 45 MG/DL
LDLC SERPL CALC-MCNC: 64 MG/DL
MICROALBUMIN UR-MCNC: 3.1 MG/DL
MICROALBUMIN/CREAT UR: 17 MG/G (ref 0–30)
POCT INT CON NEG: NEGATIVE
POCT INT CON POS: POSITIVE
POTASSIUM SERPL-SCNC: 4.4 MMOL/L (ref 3.6–5.5)
SODIUM SERPL-SCNC: 140 MMOL/L (ref 135–145)
TRIGL SERPL-MCNC: 112 MG/DL (ref 0–149)

## 2023-04-25 PROCEDURE — 84443 ASSAY THYROID STIM HORMONE: CPT

## 2023-04-25 PROCEDURE — 82043 UR ALBUMIN QUANTITATIVE: CPT

## 2023-04-25 PROCEDURE — 83036 HEMOGLOBIN GLYCOSYLATED A1C: CPT | Performed by: INTERNAL MEDICINE

## 2023-04-25 PROCEDURE — 36415 COLL VENOUS BLD VENIPUNCTURE: CPT

## 2023-04-25 PROCEDURE — 82570 ASSAY OF URINE CREATININE: CPT

## 2023-04-25 PROCEDURE — 84460 ALANINE AMINO (ALT) (SGPT): CPT

## 2023-04-25 PROCEDURE — 80061 LIPID PANEL: CPT

## 2023-04-25 PROCEDURE — 80048 BASIC METABOLIC PNL TOTAL CA: CPT

## 2023-04-25 PROCEDURE — 99215 OFFICE O/P EST HI 40 MIN: CPT | Performed by: INTERNAL MEDICINE

## 2023-04-25 RX ORDER — ERGOCALCIFEROL 1.25 MG/1
50000 CAPSULE ORAL
Qty: 12 CAPSULE | Refills: 3 | Status: SHIPPED | OUTPATIENT
Start: 2023-04-25 | End: 2024-03-08

## 2023-04-25 ASSESSMENT — PATIENT HEALTH QUESTIONNAIRE - PHQ9: CLINICAL INTERPRETATION OF PHQ2 SCORE: 0

## 2023-04-25 NOTE — PROGRESS NOTES
PRIMARY CARE CLINIC VISIT    Chief complaint:    Follow-up diabetes   Obesity  DVT  Left kidney mass  Vitamin D deficiency  Follow-up hypertension and hyperlipidemia      History of Present Illness     Severe obesity (HCC)  Chronic condition.    Body mass index is 36.84 kg/m².     Counseling on health consequences related to obesity.  Discussed with the patient regarding diet, exercise, and lifestyle modification to help achieve and maintain healthy weight          Diabetic neuropathy associated with type 2 diabetes mellitus (HCC)  This is a chronic condition.  The patient is currently taking metformin 1000 mg twice daily, glimepiride 4 mg daily and Actos 15 mg daily.  Patient denies significant hypoglycemic symptoms.  Patient tolerating medication well no significant side effects reported    A1c in the office today 6.9%.  Today I had a long discussion with the patient that I am concerned about glimepiride causing potential hypoglycemic symptoms.  Recommend patient to consider changing it to Jardiance however due to potential cost the patient declined.    Chronic deep vein thrombosis (DVT) (HCC)  Chronic condition.  Patient has been taking Xarelto 20 Mg daily.  No significant side effects reported.  Patient reported that he was seen by hematologist and was recommended to continue with Xarelto.  No history of bleeding noted.  Patient request prescription refill which has been sent to the pharmacy.    Left kidney mass  Status post ablation, 2021.  Patient had a follow-up MRI of the abdomen in November 2022 show no recurrence of tumor.  Today I recommend for the patient to repeat the MRI November 2023 for follow-up.  MRI ordered.    Hypothyroidism  Chronic condition.  Patient is taking levothyroxine.  Patient is due for lab test.    Dyslipidemia associated with type 2 diabetes mellitus (HCC)  This is a chronic condition.  Patient is taking simvastatin 20 Mg daily.  Lab test ordered for follow-up    Hypertension  associated with diabetes (HCC)  Chronic condition.  The patient is taking lisinopril 2.5 mg daily.  Blood pressure has been well controlled at home.    Vitamin D deficiency  This is a chronic condition.  The patient takes vitamin D supplementation 50,000 unit once a week.  Lab test requested for follow-up.    Current Outpatient Medications on File Prior to Visit   Medication Sig Dispense Refill    metformin (GLUCOPHAGE) 1000 MG tablet TAKE 1 TABLET BY MOUTH TWICE A DAY WITH MEALS 200 Tablet 0    glimepiride (AMARYL) 4 MG Tab TAKE 1 TABLET BY MOUTH TWICE A  Tablet 0    levothyroxine (SYNTHROID) 112 MCG Tab TAKE 1 TABLET BY MOUTH EVERY DAY IN THE MORNING ON AN EMPTY STOMACH *PT NEEDS TO MAKE APPT 100 Tablet 0    lisinopril (PRINIVIL) 2.5 MG Tab TAKE 1 TABLET BY MOUTH EVERY  Tablet 1    pioglitazone (ACTOS) 15 MG Tab TAKE 1 TABLET BY MOUTH EVERY  Tablet 1    simvastatin (ZOCOR) 20 MG Tab TAKE 1 TABLET BY MOUTH EVERY DAY IN THE EVENING 100 Tablet 2    fluocinonide (LIDEX) 0.05 % external solution Apply to affected areas of scalp BID 60 mL 3    glucose blood (FREESTYLE LITE) strip CHECK BLOOD SUGAR 1X PER DAY AND AS NEEDED FOR SIGN AND SYMPTOMS OF HIGH OR LOW SUGAR 100 Strip 5    Blood Glucose Meter Kit Freestyle Freedom Lite.  Check blood sugar  1x per day and prn ssx of high or low sugar 1 Kit 0    Lancets Per insurance coverage. Check blood sugar 1x per day and prn ssx of high or low sugar 200 Each 6    Alcohol Swabs Per insurance coverage. Wipe site with prep pad prior to injection 200 Each 6     No current facility-administered medications on file prior to visit.        Allergies: Nkda [no known drug allergy]    Current Outpatient Medications Ordered in Epic   Medication Sig Dispense Refill    rivaroxaban (XARELTO) 20 MG Tab tablet Take 1 Tablet by mouth with dinner. 90 Tablet 3    vitamin D2, Ergocalciferol, (DRISDOL) 1.25 MG (19698 UT) Cap capsule Take 1 Capsule by mouth every 7 days. 12  Capsule 3    metformin (GLUCOPHAGE) 1000 MG tablet TAKE 1 TABLET BY MOUTH TWICE A DAY WITH MEALS 200 Tablet 0    glimepiride (AMARYL) 4 MG Tab TAKE 1 TABLET BY MOUTH TWICE A  Tablet 0    levothyroxine (SYNTHROID) 112 MCG Tab TAKE 1 TABLET BY MOUTH EVERY DAY IN THE MORNING ON AN EMPTY STOMACH *PT NEEDS TO MAKE APPT 100 Tablet 0    lisinopril (PRINIVIL) 2.5 MG Tab TAKE 1 TABLET BY MOUTH EVERY  Tablet 1    pioglitazone (ACTOS) 15 MG Tab TAKE 1 TABLET BY MOUTH EVERY  Tablet 1    simvastatin (ZOCOR) 20 MG Tab TAKE 1 TABLET BY MOUTH EVERY DAY IN THE EVENING 100 Tablet 2    fluocinonide (LIDEX) 0.05 % external solution Apply to affected areas of scalp BID 60 mL 3    glucose blood (FREESTYLE LITE) strip CHECK BLOOD SUGAR 1X PER DAY AND AS NEEDED FOR SIGN AND SYMPTOMS OF HIGH OR LOW SUGAR 100 Strip 5    Blood Glucose Meter Kit Freestyle Freedom Lite.  Check blood sugar  1x per day and prn ssx of high or low sugar 1 Kit 0    Lancets Per insurance coverage. Check blood sugar 1x per day and prn ssx of high or low sugar 200 Each 6    Alcohol Swabs Per insurance coverage. Wipe site with prep pad prior to injection 200 Each 6     No current Epic-ordered facility-administered medications on file.       Past Medical History:   Diagnosis Date    Asthma     albuterol inhaler    Cancer (HCC) 2021    Kidney    Deep vein thrombosis (HCC) [I82.409] 12/9/2016    Dental disorder     has 4 teeth    Dyslipidemia 1/12/2013    Former smoker 12/7/2016    Heart burn     High cholesterol     Hypertension     Hypothyroidism 1/12/2013    Low vitamin D level 5/3/2018    Neuropathy     left hand    Obesity (BMI 30-39.9) 5/3/2018    Renal disorder     renal mass    Runny nose     chronic, allergies    Snoring     mild    Type II or unspecified type diabetes mellitus without mention of complication, uncontrolled 1/12/2013       Past Surgical History:   Procedure Laterality Date    CERVICAL DISK AND FUSION ANTERIOR  1/11/2013     "Performed by Fred Post M.D. at SURGERY [facility]    CERVICAL DECOMPRESSION POSTERIOR  2013    Performed by Fred Post M.D. at SURGERY [facility]       Family History   Problem Relation Age of Onset    Stroke Mother     Cancer Father         brain        Social History     Tobacco Use   Smoking Status Former    Years: 40.00    Types: Cigarettes    Quit date: 2007    Years since quittin.3   Smokeless Tobacco Never   Tobacco Comments    off and on smoker       Social History     Substance and Sexual Activity   Alcohol Use Yes    Alcohol/week: 0.0 oz    Comment: A beer twice a year       Review of systems.  As per HPI above. All other systems reviewed and negative.      Past Medical, Social, and Family history reviewed and updated in EPIC     Objective     /70 (BP Location: Left arm, Patient Position: Sitting, BP Cuff Size: Adult)   Pulse 66   Temp 36.7 °C (98 °F) (Temporal)   Resp 20   Ht 1.676 m (5' 6\")   Wt 104 kg (228 lb 4 oz)   SpO2 97%    Body mass index is 36.84 kg/m².    General: alert in no apparent distress.  Cardiovascular: regular rate and rhythm  Pulmonary: lungs : no wheezing   Gastrointestinal: BS present. No obvious mass noted  Monofilament testing with a 10 gram force: sensation intact: decreased bilaterally  Visual Inspection: Feet without maceration, ulcers, fissures.  Pedal pulses: decreased bilaterally       Lab Results   Component Value Date/Time    HBA1C 6.9 (A) 2023 09:01 AM    HBA1C 7.0 (A) 10/25/2022 08:49 AM    HBA1C 6.5 (H) 2021 10:58 AM       Lab Results   Component Value Date/Time    WBC 6.7 10/25/2022 10:20 AM    HEMOGLOBIN 15.2 10/25/2022 10:20 AM    HEMATOCRIT 45.3 10/25/2022 10:20 AM    MCV 92.1 10/25/2022 10:20 AM    PLATELETCT 246 10/25/2022 10:20 AM         Lab Results   Component Value Date/Time    SODIUM 140 10/25/2022 10:20 AM    POTASSIUM 4.4 10/25/2022 10:20 AM    GLUCOSE 153 (H) 10/25/2022 10:20 AM    BUN 24 (H) 10/25/2022 " 10:20 AM    CREATININE 0.87 10/25/2022 10:20 AM       Lab Results   Component Value Date/Time    CHOLSTRLTOT 132 10/25/2022 10:20 AM    LDL 62 10/25/2022 10:20 AM    HDL 48 10/25/2022 10:20 AM    TRIGLYCERIDE 112 10/25/2022 10:20 AM       Lab Results   Component Value Date/Time    ALTSGPT 16 10/25/2022 10:20 AM             Assessment and Plan     1. Diabetic neuropathy associated with type 2 diabetes mellitus (HCC)  - POCT A1C  - Basic Metabolic Panel; Future  - MICROALBUMIN CREAT RATIO URINE; Future  Chronic stable condition for A1c 6.9% as above.  As above the patient declined to have his medication adjusted.  Patient will continue with glimepiride 4 mg daily.  Metformin 1000 mg twice daily and Actos 15 Mg daily.    A1c goal of 7% discussed.  Basic physiology of Diabetes was explained to patient as well as possible microvascular/macrovascular complications.  Pt's education:   The importance of increasing physical activity to improve diabetes control was discussed with the patient.   Patient was also educated on changing diet and making better choices to help control blood sugar.   Discussed FBG goal of 100-120, 2hr PP <180       2. Chronic deep vein thrombosis (DVT) of femoral vein of right lower extremity (HCC)  - rivaroxaban (XARELTO) 20 MG Tab tablet; Take 1 Tablet by mouth with dinner.  Dispense: 90 Tablet; Refill: 3  Chronic condition.  Continue with Xarelto 20 Mg daily.    3. Low vitamin D level  - vitamin D2, Ergocalciferol, (DRISDOL) 1.25 MG (25852 UT) Cap capsule; Take 1 Capsule by mouth every 7 days.  Dispense: 12 Capsule; Refill: 3  Chronic condition.  Status unclear.  Lab test ordered for follow-up.  4. Left kidney mass  Chronic condition.  Status post cryoablation therapy 2021.  I have ordered the MRI to be repeated November 2023    - MR-ABDOMEN-WITH & W/O; Future    5. Hypothyroidism, unspecified type  - TSH; Future  Chronic condition.  Current status unclear.  Lab test ordered for follow-up.   Continue with levothyroxine 112 mcg daily.    6. Dyslipidemia associated with type 2 diabetes mellitus (HCC)  - ALANINE AMINO-TRANS; Future  - Lipid Profile; Future  Chronic condition.  Current status unclear.  Lipid panel ordered.  Continue with simvastatin 20 Mg daily.    7. Encounter for abdominal aortic aneurysm (AAA) screening  - US-ABDOMINAL AORTA W/O DOPPLER; Future    8. Severe obesity (HCC)  Chronic condition.  Uncontrolled.  Recommend healthy diet and exercise.  Encouraged the patient to lose weight.    9. Hypertension associated with diabetes (HCC)  This is a chronic condition.  Continue with lisinopril 2.5 mg daily.    10. Vitamin D deficiency    Chronic condition.  Lab test requested.  Continue with vitamin D supplementation 50,000 unit once a week.      .Attestation: I spent:   44  min -  That includes time for chart review before the visit, the actual patient visit, and time spent on documentation in EMR after the visit.  Chart review/prep, review of other providers' records, imaging/lab review, face-to-face time for history/examination, ordering, prescribing,  review of results/meds/ treatment plan with patient, and care coordination.    The patient is of extensive complexity. This pt is at high risk for complications and morbidity.             Please note that this dictation was created using voice recognition software. I have made every reasonable attempt to correct obvious errors, but I expect that there are errors of grammar and possibly content that I did not discover before finalizing the note.    Antony Alonzo MD  Internal Medicine  [facility]

## 2023-04-25 NOTE — ASSESSMENT & PLAN NOTE
Chronic condition.    Body mass index is 36.84 kg/m².     Counseling on health consequences related to obesity.  Discussed with the patient regarding diet, exercise, and lifestyle modification to help achieve and maintain healthy weight        
Chronic condition.  Patient has been taking Xarelto 20 Mg daily.  No significant side effects reported.  Patient reported that he was seen by hematologist and was recommended to continue with Xarelto.  No history of bleeding noted.  Patient request prescription refill which has been sent to the pharmacy.  
Chronic condition.  Patient is taking levothyroxine.  Patient is due for lab test.  
Chronic condition.  The patient is taking lisinopril 2.5 mg daily.  Blood pressure has been well controlled at home.  
Status post ablation, 2021.  Patient had a follow-up MRI of the abdomen in November 2022 show no recurrence of tumor.  Today I recommend for the patient to repeat the MRI November 2023 for follow-up.  MRI ordered.  
This is a chronic condition.  Patient is taking simvastatin 20 Mg daily.  Lab test ordered for follow-up  
This is a chronic condition.  The patient is currently taking metformin 1000 mg twice daily, glimepiride 4 mg daily and Actos 15 mg daily.  Patient denies significant hypoglycemic symptoms.  Patient tolerating medication well no significant side effects reported    A1c in the office today 6.9%.  Today I had a long discussion with the patient that I am concerned about glimepiride causing potential hypoglycemic symptoms.  Recommend patient to consider changing it to Jardiance however due to potential cost the patient declined.  
This is a chronic condition.  The patient takes vitamin D supplementation 50,000 unit once a week.  Lab test requested for follow-up.  
parents need to be involved in his care

## 2023-04-26 PROBLEM — I82.509 CHRONIC DEEP VEIN THROMBOSIS (DVT) (HCC): Chronic | Status: ACTIVE | Noted: 2023-04-26

## 2023-04-26 PROBLEM — I82.509 CHRONIC DEEP VEIN THROMBOSIS (DVT) (HCC): Status: ACTIVE | Noted: 2023-04-26

## 2023-04-26 LAB — TSH SERPL DL<=0.005 MIU/L-ACNC: 2.69 UIU/ML (ref 0.38–5.33)

## 2023-04-27 ENCOUNTER — TELEPHONE (OUTPATIENT)
Dept: HEALTH INFORMATION MANAGEMENT | Facility: OTHER | Age: 73
End: 2023-04-27
Payer: MEDICARE

## 2023-05-17 ENCOUNTER — HOSPITAL ENCOUNTER (OUTPATIENT)
Dept: RADIOLOGY | Facility: MEDICAL CENTER | Age: 73
End: 2023-05-17
Attending: INTERNAL MEDICINE
Payer: MEDICARE

## 2023-05-17 DIAGNOSIS — Z13.6 ENCOUNTER FOR ABDOMINAL AORTIC ANEURYSM (AAA) SCREENING: ICD-10-CM

## 2023-05-17 PROCEDURE — 76775 US EXAM ABDO BACK WALL LIM: CPT

## 2023-05-18 DIAGNOSIS — E11.42 DIABETIC POLYNEUROPATHY ASSOCIATED WITH TYPE 2 DIABETES MELLITUS (HCC): ICD-10-CM

## 2023-05-19 DIAGNOSIS — E11.9 TYPE 2 DIABETES MELLITUS WITHOUT COMPLICATION, WITHOUT LONG-TERM CURRENT USE OF INSULIN (HCC): ICD-10-CM

## 2023-05-19 RX ORDER — BLOOD-GLUCOSE METER
KIT MISCELLANEOUS
Qty: 100 STRIP | Refills: 5 | Status: SHIPPED | OUTPATIENT
Start: 2023-05-19

## 2023-05-22 RX ORDER — PIOGLITAZONEHYDROCHLORIDE 15 MG/1
15 TABLET ORAL
Qty: 100 TABLET | Refills: 1 | Status: SHIPPED | OUTPATIENT
Start: 2023-05-22 | End: 2024-03-05

## 2023-05-22 RX ORDER — LISINOPRIL 2.5 MG/1
TABLET ORAL
Qty: 100 TABLET | Refills: 1 | Status: SHIPPED | OUTPATIENT
Start: 2023-05-22 | End: 2023-12-26

## 2023-05-22 RX ORDER — LEVOTHYROXINE SODIUM 112 UG/1
TABLET ORAL
Qty: 100 TABLET | Refills: 0 | Status: SHIPPED | OUTPATIENT
Start: 2023-05-22 | End: 2023-08-14

## 2023-05-22 RX ORDER — GLIMEPIRIDE 4 MG/1
TABLET ORAL
Qty: 200 TABLET | Refills: 0 | Status: SHIPPED | OUTPATIENT
Start: 2023-05-22 | End: 2023-08-02

## 2023-06-07 DIAGNOSIS — E11.9 TYPE 2 DIABETES MELLITUS WITHOUT COMPLICATION, WITHOUT LONG-TERM CURRENT USE OF INSULIN (HCC): ICD-10-CM

## 2023-06-08 ENCOUNTER — DOCUMENTATION (OUTPATIENT)
Dept: HEALTH INFORMATION MANAGEMENT | Facility: OTHER | Age: 73
End: 2023-06-08
Payer: MEDICARE

## 2023-08-01 DIAGNOSIS — E78.5 HYPERLIPIDEMIA, UNSPECIFIED HYPERLIPIDEMIA TYPE: ICD-10-CM

## 2023-08-01 DIAGNOSIS — E11.9 TYPE 2 DIABETES MELLITUS WITHOUT COMPLICATION, WITHOUT LONG-TERM CURRENT USE OF INSULIN (HCC): ICD-10-CM

## 2023-08-02 RX ORDER — SIMVASTATIN 20 MG
TABLET ORAL
Qty: 100 TABLET | Refills: 2 | Status: SHIPPED | OUTPATIENT
Start: 2023-08-02

## 2023-08-02 RX ORDER — GLIMEPIRIDE 4 MG/1
TABLET ORAL
Qty: 200 TABLET | Refills: 0 | Status: SHIPPED | OUTPATIENT
Start: 2023-08-02

## 2023-08-14 RX ORDER — LEVOTHYROXINE SODIUM 112 UG/1
TABLET ORAL
Qty: 100 TABLET | Refills: 0 | Status: SHIPPED | OUTPATIENT
Start: 2023-08-14 | End: 2023-10-26

## 2023-10-26 RX ORDER — LEVOTHYROXINE SODIUM 112 UG/1
TABLET ORAL
Qty: 100 TABLET | Refills: 0 | Status: SHIPPED | OUTPATIENT
Start: 2023-10-26

## 2023-12-24 DIAGNOSIS — E11.9 TYPE 2 DIABETES MELLITUS WITHOUT COMPLICATION, WITHOUT LONG-TERM CURRENT USE OF INSULIN (HCC): ICD-10-CM

## 2023-12-26 RX ORDER — LISINOPRIL 2.5 MG/1
TABLET ORAL
Qty: 100 TABLET | Refills: 0 | Status: SHIPPED | OUTPATIENT
Start: 2023-12-26

## 2024-01-26 DIAGNOSIS — E11.9 TYPE 2 DIABETES MELLITUS WITHOUT COMPLICATION, WITHOUT LONG-TERM CURRENT USE OF INSULIN (HCC): ICD-10-CM

## 2024-01-26 NOTE — TELEPHONE ENCOUNTER
Received request via: Pharmacy    Was the patient seen in the last year in this department? Yes    Does the patient have an active prescription (recently filled or refills available) for medication(s) requested? No    Pharmacy Name: [facility]    Does the patient have long term Plus and need 100 day supply (blood pressure, diabetes and cholesterol meds only)? Yes, quantity updated to 100 days

## 2024-02-08 RX ORDER — LANCETS 30 GAUGE
EACH MISCELLANEOUS
Qty: 200 EACH | Refills: 0 | Status: SHIPPED | OUTPATIENT
Start: 2024-02-08

## 2024-02-08 NOTE — TELEPHONE ENCOUNTER
Received request via: Patient    Was the patient seen in the last year in this department? Yes    Does the patient have an active prescription (recently filled or refills available) for medication(s) requested? No      Does the patient have half-way Plus and need 100 day supply (blood pressure, diabetes and cholesterol meds only)? Yes, quantity updated to 100 days and Medication is not for cholesterol, blood pressure or diabetes

## 2024-03-05 DIAGNOSIS — E11.9 TYPE 2 DIABETES MELLITUS WITHOUT COMPLICATION, WITHOUT LONG-TERM CURRENT USE OF INSULIN (HCC): ICD-10-CM

## 2024-03-05 RX ORDER — PIOGLITAZONEHYDROCHLORIDE 15 MG/1
15 TABLET ORAL
Qty: 100 TABLET | Refills: 0 | Status: SHIPPED | OUTPATIENT
Start: 2024-03-05

## 2024-03-06 NOTE — TELEPHONE ENCOUNTER
Received request via: Pharmacy    Was the patient seen in the last year in this department? Yes    Does the patient have an active prescription (recently filled or refills available) for medication(s) requested? No        Does the patient have retirement Plus and need 100 day supply (blood pressure, diabetes and cholesterol meds only)? Yes, quantity updated to 100 days

## 2024-03-07 DIAGNOSIS — E11.9 TYPE 2 DIABETES MELLITUS WITHOUT COMPLICATION, WITHOUT LONG-TERM CURRENT USE OF INSULIN (HCC): ICD-10-CM

## 2024-03-07 DIAGNOSIS — R79.89 LOW VITAMIN D LEVEL: ICD-10-CM

## 2024-03-08 RX ORDER — ERGOCALCIFEROL 1.25 MG/1
50000 CAPSULE ORAL
Qty: 12 CAPSULE | Refills: 0 | Status: SHIPPED | OUTPATIENT
Start: 2024-03-08

## 2024-04-13 NOTE — ASSESSMENT & PLAN NOTE
This is a chronic condition for the patient currently taking Actos metformin and glimepiride  The most recent A1c has improved and came down 7.2%.  It was 8.9% in April 2019   yes

## 2024-04-19 ENCOUNTER — TELEPHONE (OUTPATIENT)
Dept: HEALTH INFORMATION MANAGEMENT | Facility: OTHER | Age: 74
End: 2024-04-19

## 2024-06-09 DIAGNOSIS — E78.5 HYPERLIPIDEMIA, UNSPECIFIED HYPERLIPIDEMIA TYPE: ICD-10-CM

## 2024-06-09 DIAGNOSIS — E11.9 TYPE 2 DIABETES MELLITUS WITHOUT COMPLICATION, WITHOUT LONG-TERM CURRENT USE OF INSULIN (HCC): ICD-10-CM

## 2024-06-09 DIAGNOSIS — E11.42 DIABETIC POLYNEUROPATHY ASSOCIATED WITH TYPE 2 DIABETES MELLITUS (HCC): ICD-10-CM

## 2024-06-09 DIAGNOSIS — R79.89 LOW VITAMIN D LEVEL: ICD-10-CM

## 2024-06-09 DIAGNOSIS — I82.511 CHRONIC DEEP VEIN THROMBOSIS (DVT) OF FEMORAL VEIN OF RIGHT LOWER EXTREMITY (HCC): ICD-10-CM

## 2024-06-11 RX ORDER — PIOGLITAZONEHYDROCHLORIDE 15 MG/1
15 TABLET ORAL
Qty: 60 TABLET | Refills: 0 | Status: SHIPPED | OUTPATIENT
Start: 2024-06-11

## 2024-06-11 RX ORDER — BLOOD-GLUCOSE METER
KIT MISCELLANEOUS
Qty: 100 STRIP | Refills: 5 | Status: SHIPPED | OUTPATIENT
Start: 2024-06-11

## 2024-06-11 RX ORDER — ERGOCALCIFEROL 1.25 MG/1
50000 CAPSULE ORAL
Qty: 12 CAPSULE | Refills: 0 | Status: SHIPPED | OUTPATIENT
Start: 2024-06-11

## 2024-06-11 RX ORDER — SIMVASTATIN 20 MG
TABLET ORAL
Qty: 60 TABLET | Refills: 0 | Status: SHIPPED | OUTPATIENT
Start: 2024-06-11

## 2024-06-11 RX ORDER — GLIMEPIRIDE 4 MG/1
TABLET ORAL
Qty: 120 TABLET | Refills: 0 | Status: SHIPPED | OUTPATIENT
Start: 2024-06-11

## 2024-06-11 RX ORDER — RIVAROXABAN 20 MG/1
20 TABLET, FILM COATED ORAL
Qty: 60 TABLET | Refills: 0 | Status: SHIPPED | OUTPATIENT
Start: 2024-06-11

## 2024-06-11 RX ORDER — LEVOTHYROXINE SODIUM 112 UG/1
TABLET ORAL
Qty: 60 TABLET | Refills: 0 | Status: SHIPPED | OUTPATIENT
Start: 2024-06-11

## 2024-06-11 NOTE — TELEPHONE ENCOUNTER
Received request via: Pharmacy    Was the patient seen in the last year in this department? No    Does the patient have an active prescription (recently filled or refills available) for medication(s) requested? No        Does the patient have long term Plus and need 100 day supply (blood pressure, diabetes and cholesterol meds only)? Patient does not have SCP

## 2024-06-26 DIAGNOSIS — E11.9 TYPE 2 DIABETES MELLITUS WITHOUT COMPLICATION, WITHOUT LONG-TERM CURRENT USE OF INSULIN (HCC): ICD-10-CM

## 2024-06-26 RX ORDER — LISINOPRIL 2.5 MG/1
TABLET ORAL
Qty: 60 TABLET | Refills: 0 | Status: SHIPPED | OUTPATIENT
Start: 2024-06-26

## 2024-06-26 NOTE — TELEPHONE ENCOUNTER
Received request via: Pharmacy    Was the patient seen in the last year in this department? Yes    Does the patient have an active prescription (recently filled or refills available) for medication(s) requested? No    [facility]     Does the patient have [facility] Plus and need 100 day supply (blood pressure, diabetes and cholesterol meds only)? Yes, quantity updated to 100 days

## 2024-08-09 RX ORDER — LEVOTHYROXINE SODIUM 112 UG/1
TABLET ORAL
Qty: 60 TABLET | Refills: 0 | Status: SHIPPED | OUTPATIENT
Start: 2024-08-09

## 2024-10-21 DIAGNOSIS — E78.5 HYPERLIPIDEMIA, UNSPECIFIED HYPERLIPIDEMIA TYPE: ICD-10-CM

## 2024-10-21 DIAGNOSIS — E11.9 TYPE 2 DIABETES MELLITUS WITHOUT COMPLICATION, WITHOUT LONG-TERM CURRENT USE OF INSULIN (HCC): ICD-10-CM

## 2024-10-21 RX ORDER — SIMVASTATIN 20 MG
20 TABLET ORAL EVERY EVENING
Qty: 100 TABLET | Refills: 1 | Status: SHIPPED | OUTPATIENT
Start: 2024-10-21 | End: 2024-10-23

## 2024-10-21 RX ORDER — LISINOPRIL 2.5 MG/1
2.5 TABLET ORAL
Qty: 100 TABLET | Refills: 0 | Status: SHIPPED | OUTPATIENT
Start: 2024-10-21 | End: 2024-10-23

## 2024-10-23 ENCOUNTER — HOSPITAL ENCOUNTER (OUTPATIENT)
Dept: LAB | Facility: MEDICAL CENTER | Age: 74
End: 2024-10-23
Attending: INTERNAL MEDICINE
Payer: MEDICARE

## 2024-10-23 ENCOUNTER — OFFICE VISIT (OUTPATIENT)
Dept: MEDICAL GROUP | Facility: PHYSICIAN GROUP | Age: 74
End: 2024-10-23
Payer: MEDICARE

## 2024-10-23 VITALS
BODY MASS INDEX: 33.9 KG/M2 | TEMPERATURE: 97.9 F | HEIGHT: 68 IN | SYSTOLIC BLOOD PRESSURE: 124 MMHG | HEART RATE: 57 BPM | OXYGEN SATURATION: 97 % | DIASTOLIC BLOOD PRESSURE: 68 MMHG | WEIGHT: 223.7 LBS

## 2024-10-23 DIAGNOSIS — E11.69 DYSLIPIDEMIA ASSOCIATED WITH TYPE 2 DIABETES MELLITUS (HCC): Chronic | ICD-10-CM

## 2024-10-23 DIAGNOSIS — I15.2 HYPERTENSION ASSOCIATED WITH DIABETES (HCC): Chronic | ICD-10-CM

## 2024-10-23 DIAGNOSIS — E11.42 DIABETIC POLYNEUROPATHY ASSOCIATED WITH TYPE 2 DIABETES MELLITUS (HCC): Chronic | ICD-10-CM

## 2024-10-23 DIAGNOSIS — E11.59 HYPERTENSION ASSOCIATED WITH DIABETES (HCC): Chronic | ICD-10-CM

## 2024-10-23 DIAGNOSIS — E03.9 ACQUIRED HYPOTHYROIDISM: Chronic | ICD-10-CM

## 2024-10-23 DIAGNOSIS — I82.5Y9 CHRONIC DEEP VEIN THROMBOSIS (DVT) OF PROXIMAL VEIN OF LOWER EXTREMITY, UNSPECIFIED LATERALITY (HCC): Chronic | ICD-10-CM

## 2024-10-23 DIAGNOSIS — E55.9 VITAMIN D DEFICIENCY: Chronic | ICD-10-CM

## 2024-10-23 DIAGNOSIS — N28.89 LEFT KIDNEY MASS: ICD-10-CM

## 2024-10-23 DIAGNOSIS — L21.9 SEBORRHEIC DERMATITIS: ICD-10-CM

## 2024-10-23 DIAGNOSIS — E66.01 SEVERE OBESITY (HCC): Chronic | ICD-10-CM

## 2024-10-23 DIAGNOSIS — Z23 NEED FOR VACCINATION: ICD-10-CM

## 2024-10-23 DIAGNOSIS — E78.5 DYSLIPIDEMIA ASSOCIATED WITH TYPE 2 DIABETES MELLITUS (HCC): Chronic | ICD-10-CM

## 2024-10-23 PROBLEM — L30.9 ECZEMA: Status: ACTIVE | Noted: 2024-10-23

## 2024-10-23 LAB
25(OH)D3 SERPL-MCNC: 47 NG/ML (ref 30–100)
ALT SERPL-CCNC: 18 U/L (ref 2–50)
ANION GAP SERPL CALC-SCNC: 11 MMOL/L (ref 7–16)
BASOPHILS # BLD AUTO: 0.8 % (ref 0–1.8)
BASOPHILS # BLD: 0.05 K/UL (ref 0–0.12)
BUN SERPL-MCNC: 19 MG/DL (ref 8–22)
CALCIUM SERPL-MCNC: 10.1 MG/DL (ref 8.5–10.5)
CHLORIDE SERPL-SCNC: 101 MMOL/L (ref 96–112)
CHOLEST SERPL-MCNC: 163 MG/DL (ref 100–199)
CO2 SERPL-SCNC: 27 MMOL/L (ref 20–33)
CREAT SERPL-MCNC: 0.85 MG/DL (ref 0.5–1.4)
CREAT UR-MCNC: 253 MG/DL
EOSINOPHIL # BLD AUTO: 0.27 K/UL (ref 0–0.51)
EOSINOPHIL NFR BLD: 4.3 % (ref 0–6.9)
ERYTHROCYTE [DISTWIDTH] IN BLOOD BY AUTOMATED COUNT: 45.8 FL (ref 35.9–50)
EST. AVERAGE GLUCOSE BLD GHB EST-MCNC: 166 MG/DL
FASTING STATUS PATIENT QL REPORTED: NORMAL
GFR SERPLBLD CREATININE-BSD FMLA CKD-EPI: 90 ML/MIN/1.73 M 2
GLUCOSE SERPL-MCNC: 242 MG/DL (ref 65–99)
HBA1C MFR BLD: 7.4 % (ref 4–5.6)
HCT VFR BLD AUTO: 46.1 % (ref 42–52)
HDLC SERPL-MCNC: 47 MG/DL
HGB BLD-MCNC: 15.9 G/DL (ref 14–18)
IMM GRANULOCYTES # BLD AUTO: 0.01 K/UL (ref 0–0.11)
IMM GRANULOCYTES NFR BLD AUTO: 0.2 % (ref 0–0.9)
LDLC SERPL CALC-MCNC: 85 MG/DL
LYMPHOCYTES # BLD AUTO: 1.58 K/UL (ref 1–4.8)
LYMPHOCYTES NFR BLD: 25.1 % (ref 22–41)
MCH RBC QN AUTO: 32.3 PG (ref 27–33)
MCHC RBC AUTO-ENTMCNC: 34.5 G/DL (ref 32.3–36.5)
MCV RBC AUTO: 93.5 FL (ref 81.4–97.8)
MICROALBUMIN UR-MCNC: 6.7 MG/DL
MICROALBUMIN/CREAT UR: 26 MG/G (ref 0–30)
MONOCYTES # BLD AUTO: 0.78 K/UL (ref 0–0.85)
MONOCYTES NFR BLD AUTO: 12.4 % (ref 0–13.4)
NEUTROPHILS # BLD AUTO: 3.61 K/UL (ref 1.82–7.42)
NEUTROPHILS NFR BLD: 57.2 % (ref 44–72)
NRBC # BLD AUTO: 0 K/UL
NRBC BLD-RTO: 0 /100 WBC (ref 0–0.2)
PLATELET # BLD AUTO: 255 K/UL (ref 164–446)
PMV BLD AUTO: 10.9 FL (ref 9–12.9)
POTASSIUM SERPL-SCNC: 4.7 MMOL/L (ref 3.6–5.5)
RBC # BLD AUTO: 4.93 M/UL (ref 4.7–6.1)
SODIUM SERPL-SCNC: 139 MMOL/L (ref 135–145)
TRIGL SERPL-MCNC: 156 MG/DL (ref 0–149)
TSH SERPL-ACNC: 14.2 UIU/ML (ref 0.35–5.5)
WBC # BLD AUTO: 6.3 K/UL (ref 4.8–10.8)

## 2024-10-23 PROCEDURE — 82043 UR ALBUMIN QUANTITATIVE: CPT

## 2024-10-23 PROCEDURE — 82570 ASSAY OF URINE CREATININE: CPT

## 2024-10-23 PROCEDURE — 3074F SYST BP LT 130 MM HG: CPT | Performed by: INTERNAL MEDICINE

## 2024-10-23 PROCEDURE — 3078F DIAST BP <80 MM HG: CPT | Performed by: INTERNAL MEDICINE

## 2024-10-23 PROCEDURE — 83036 HEMOGLOBIN GLYCOSYLATED A1C: CPT

## 2024-10-23 PROCEDURE — 99215 OFFICE O/P EST HI 40 MIN: CPT | Performed by: INTERNAL MEDICINE

## 2024-10-23 PROCEDURE — 36415 COLL VENOUS BLD VENIPUNCTURE: CPT

## 2024-10-23 PROCEDURE — 80048 BASIC METABOLIC PNL TOTAL CA: CPT

## 2024-10-23 PROCEDURE — 84443 ASSAY THYROID STIM HORMONE: CPT

## 2024-10-23 PROCEDURE — 80061 LIPID PANEL: CPT

## 2024-10-23 PROCEDURE — 82306 VITAMIN D 25 HYDROXY: CPT

## 2024-10-23 PROCEDURE — 85025 COMPLETE CBC W/AUTO DIFF WBC: CPT

## 2024-10-23 PROCEDURE — 84460 ALANINE AMINO (ALT) (SGPT): CPT

## 2024-10-23 RX ORDER — SIMVASTATIN 20 MG
20 TABLET ORAL EVERY EVENING
Qty: 100 TABLET | Refills: 3 | Status: SHIPPED | OUTPATIENT
Start: 2024-10-23

## 2024-10-23 RX ORDER — GLIMEPIRIDE 4 MG/1
4 TABLET ORAL 2 TIMES DAILY
Qty: 200 TABLET | Refills: 3 | Status: SHIPPED | OUTPATIENT
Start: 2024-10-23

## 2024-10-23 RX ORDER — ERGOCALCIFEROL 1.25 MG/1
50000 CAPSULE, LIQUID FILLED ORAL
Qty: 12 CAPSULE | Refills: 3 | Status: SHIPPED | OUTPATIENT
Start: 2024-10-23

## 2024-10-23 RX ORDER — LISINOPRIL 2.5 MG/1
2.5 TABLET ORAL
Qty: 100 TABLET | Refills: 3 | Status: SHIPPED | OUTPATIENT
Start: 2024-10-23

## 2024-10-23 RX ORDER — LEVOTHYROXINE SODIUM 112 UG/1
112 TABLET ORAL
Qty: 100 TABLET | Refills: 3 | Status: SHIPPED | OUTPATIENT
Start: 2024-10-23 | End: 2024-10-26

## 2024-10-23 RX ORDER — PIOGLITAZONEHYDROCHLORIDE 15 MG/1
15 TABLET ORAL
Qty: 100 TABLET | Refills: 3 | Status: SHIPPED | OUTPATIENT
Start: 2024-10-23

## 2024-10-23 ASSESSMENT — PATIENT HEALTH QUESTIONNAIRE - PHQ9: CLINICAL INTERPRETATION OF PHQ2 SCORE: 0

## 2024-10-26 DIAGNOSIS — E03.9 ACQUIRED HYPOTHYROIDISM: Chronic | ICD-10-CM

## 2024-10-26 RX ORDER — LEVOTHYROXINE SODIUM 125 UG/1
125 TABLET ORAL
Qty: 90 TABLET | Refills: 3 | Status: SHIPPED | OUTPATIENT
Start: 2024-10-26

## 2024-11-07 ENCOUNTER — TELEPHONE (OUTPATIENT)
Dept: MEDICAL GROUP | Facility: PHYSICIAN GROUP | Age: 74
End: 2024-11-07
Payer: MEDICARE

## 2024-11-07 DIAGNOSIS — E11.3392 MODERATE NONPROLIFERATIVE DIABETIC RETINOPATHY OF LEFT EYE WITHOUT MACULAR EDEMA ASSOCIATED WITH TYPE 2 DIABETES MELLITUS (HCC): ICD-10-CM

## 2024-11-07 DIAGNOSIS — E11.3391 MODERATE NONPROLIFERATIVE DIABETIC RETINOPATHY OF RIGHT EYE WITHOUT MACULAR EDEMA ASSOCIATED WITH TYPE 2 DIABETES MELLITUS (HCC): ICD-10-CM

## 2024-11-07 DIAGNOSIS — E11.3393 MODERATE NONPROLIFERATIVE DIABETIC RETINOPATHY OF BOTH EYES WITHOUT MACULAR EDEMA ASSOCIATED WITH TYPE 2 DIABETES MELLITUS (HCC): ICD-10-CM

## 2024-11-07 LAB — RETINAL SCREEN: POSITIVE

## 2024-11-07 NOTE — TELEPHONE ENCOUNTER
Pt had positive retina scan dx: moderate non proliferative diabetic retinopathy.   Referred to ophthalmology.  Left message to return call.    1st atempt

## 2024-11-07 NOTE — TELEPHONE ENCOUNTER
2. SPECIFIC Action To Be Taken: Referral pending, please sign.    3. Diagnosis/Clinical Reason for Request: moderate nonproliferated diabetic retinopathy     4. Specialty & Provider Name/Lab/Imaging Location: ophthalmology    5. Is appointment scheduled for requested order/referral: no    Patient was not informed they will receive a return phone call from the office ONLY if there are any questions before processing their request. Advised to call back if they haven't received a call from the referral department in 5 days.

## 2024-11-08 NOTE — TELEPHONE ENCOUNTER
Phone Number Called: [phone] (home)       Call outcome: Spoke to patient regarding message below.

## 2024-12-25 ENCOUNTER — HOSPITAL ENCOUNTER (OUTPATIENT)
Dept: RADIOLOGY | Facility: MEDICAL CENTER | Age: 74
End: 2024-12-25
Attending: INTERNAL MEDICINE
Payer: MEDICARE

## 2024-12-25 DIAGNOSIS — N28.89 LEFT KIDNEY MASS: ICD-10-CM

## 2024-12-25 PROCEDURE — 700117 HCHG RX CONTRAST REV CODE 255: Mod: JZ | Performed by: INTERNAL MEDICINE

## 2024-12-25 PROCEDURE — 74183 MRI ABD W/O CNTR FLWD CNTR: CPT

## 2024-12-25 PROCEDURE — A9579 GAD-BASE MR CONTRAST NOS,1ML: HCPCS | Mod: JZ | Performed by: INTERNAL MEDICINE

## 2024-12-25 RX ADMIN — GADOTERIDOL 20 ML: 279.3 INJECTION, SOLUTION INTRAVENOUS at 10:27

## 2025-02-20 RX ORDER — PIOGLITAZONE 15 MG/1
1 TABLET ORAL
COMMUNITY

## 2025-02-20 RX ORDER — LISINOPRIL 2.5 MG/1
1 TABLET ORAL
COMMUNITY
End: 2025-02-24

## 2025-02-20 RX ORDER — SIMVASTATIN 20 MG
1 TABLET ORAL EVERY EVENING
COMMUNITY
End: 2025-02-24

## 2025-02-24 ENCOUNTER — OFFICE VISIT (OUTPATIENT)
Dept: MEDICAL GROUP | Facility: PHYSICIAN GROUP | Age: 75
End: 2025-02-24
Payer: MEDICARE

## 2025-02-24 VITALS
HEIGHT: 68 IN | HEART RATE: 60 BPM | OXYGEN SATURATION: 97 % | WEIGHT: 215.6 LBS | SYSTOLIC BLOOD PRESSURE: 130 MMHG | RESPIRATION RATE: 16 BRPM | TEMPERATURE: 98 F | BODY MASS INDEX: 32.67 KG/M2 | DIASTOLIC BLOOD PRESSURE: 68 MMHG

## 2025-02-24 DIAGNOSIS — E78.5 DYSLIPIDEMIA ASSOCIATED WITH TYPE 2 DIABETES MELLITUS (HCC): Chronic | ICD-10-CM

## 2025-02-24 DIAGNOSIS — I15.2 HYPERTENSION ASSOCIATED WITH DIABETES (HCC): Chronic | ICD-10-CM

## 2025-02-24 DIAGNOSIS — E11.59 HYPERTENSION ASSOCIATED WITH DIABETES (HCC): Chronic | ICD-10-CM

## 2025-02-24 DIAGNOSIS — Z23 NEED FOR VACCINATION: ICD-10-CM

## 2025-02-24 DIAGNOSIS — Z02.9 ADMINISTRATIVE ENCOUNTER: ICD-10-CM

## 2025-02-24 DIAGNOSIS — E55.9 VITAMIN D DEFICIENCY: Chronic | ICD-10-CM

## 2025-02-24 DIAGNOSIS — I82.5Y9 CHRONIC DEEP VEIN THROMBOSIS (DVT) OF PROXIMAL VEIN OF LOWER EXTREMITY, UNSPECIFIED LATERALITY (HCC): Chronic | ICD-10-CM

## 2025-02-24 DIAGNOSIS — E11.42 DIABETIC POLYNEUROPATHY ASSOCIATED WITH TYPE 2 DIABETES MELLITUS (HCC): Chronic | ICD-10-CM

## 2025-02-24 DIAGNOSIS — E66.3 OVERWEIGHT: ICD-10-CM

## 2025-02-24 DIAGNOSIS — E03.9 ACQUIRED HYPOTHYROIDISM: Chronic | ICD-10-CM

## 2025-02-24 DIAGNOSIS — N28.89 LEFT KIDNEY MASS: Chronic | ICD-10-CM

## 2025-02-24 DIAGNOSIS — E11.69 DYSLIPIDEMIA ASSOCIATED WITH TYPE 2 DIABETES MELLITUS (HCC): Chronic | ICD-10-CM

## 2025-02-24 PROCEDURE — 3078F DIAST BP <80 MM HG: CPT | Performed by: INTERNAL MEDICINE

## 2025-02-24 PROCEDURE — 3075F SYST BP GE 130 - 139MM HG: CPT | Performed by: INTERNAL MEDICINE

## 2025-02-24 PROCEDURE — 99215 OFFICE O/P EST HI 40 MIN: CPT | Mod: 25 | Performed by: INTERNAL MEDICINE

## 2025-02-24 PROCEDURE — G0008 ADMIN INFLUENZA VIRUS VAC: HCPCS | Performed by: INTERNAL MEDICINE

## 2025-02-24 PROCEDURE — 90662 IIV NO PRSV INCREASED AG IM: CPT | Performed by: INTERNAL MEDICINE

## 2025-02-24 RX ORDER — GLIMEPIRIDE 4 MG/1
4 TABLET ORAL 2 TIMES DAILY
Qty: 200 TABLET | Refills: 3 | Status: SHIPPED | OUTPATIENT
Start: 2025-02-24

## 2025-02-24 RX ORDER — EMPAGLIFLOZIN 10 MG/1
10 TABLET, FILM COATED ORAL DAILY
Qty: 90 TABLET | Refills: 3 | Status: SHIPPED | OUTPATIENT
Start: 2025-02-24 | End: 2025-02-24

## 2025-02-24 ASSESSMENT — PATIENT HEALTH QUESTIONNAIRE - PHQ9: CLINICAL INTERPRETATION OF PHQ2 SCORE: 0

## 2025-02-24 NOTE — ASSESSMENT & PLAN NOTE
This is a chronic condition.  The patient presently taking glimepiride.  Metformin 1000 Mg twice daily.  Actos 15 Mg daily.  Patient denies significant hypoglycemia.

## 2025-02-24 NOTE — ASSESSMENT & PLAN NOTE
Body mass index is 32.78 kg/m².   Chronic condition.  Recommend pt to follow a healthy , well balance diet  Pt to continue with regular exercise activity and to maintain ideal weight.

## 2025-02-24 NOTE — ASSESSMENT & PLAN NOTE
Chronic.  Patient is taking lisinopril.  Patient denies chest pain shortness of breath palpitation or near syncope.

## 2025-02-24 NOTE — PROGRESS NOTES
PRIMARY CARE CLINIC VISIT        Chief Complaint   Patient presents with    Follow-Up    Paperwork     DMV      DMV paperwork  Follow-up diabetes  Left kidney mass  Chronic DVT  Hypothyroidism  Overweight  Hyperlipidemia  Hypertension  Vitamin D deficiency        History of Present Illness     Administrative encounter  Patient request DMV paperwork to be completed.    Diabetic neuropathy associated with type 2 diabetes mellitus (HCC)  This is a chronic condition.  The patient presently taking glimepiride.  Metformin 1000 Mg twice daily.  Actos 15 Mg daily.  Patient denies significant hypoglycemia.    Left kidney mass  Chronic condition.  Patient is status post cryotherapy 2021.  Patient denies dysuria or hematuria.    MRI completed December 2024.  Result discussed with the patient    IMPRESSION:     1.  Post ablation changes posterior LEFT mid kidney.  2.  No evidence for recurrent tumor or metastatic disease.    Chronic deep vein thrombosis (DVT) (HCC)  Chronic condition.  The patient presently taking Xarelto.  No history of bleeding reported.    Hypothyroidism  This is a chronic condition.  The patient presently taking thyroxine.  No new symptoms reported.    Overweight  Body mass index is 32.78 kg/m².   Chronic condition.  Recommend pt to follow a healthy , well balance diet  Pt to continue with regular exercise activity and to maintain ideal weight.        Dyslipidemia associated with type 2 diabetes mellitus (HCC)  Chronic.  Patient currently taking simvastatin.  Previous lipid panel result discussed with the patient.    Hypertension associated with diabetes (HCC)  Chronic.  Patient is taking lisinopril.  Patient denies chest pain shortness of breath palpitation or near syncope.    Vitamin D deficiency  Chronic.  Patient has been taking vitamin D supplementation 50,000 unit once a week.    Current Outpatient Medications on File Prior to Visit   Medication Sig Dispense Refill    levothyroxine (SYNTHROID) 125 MCG  Tab Take 1 Tablet by mouth every morning on an empty stomach. 90 Tablet 3    lisinopril (PRINIVIL) 2.5 MG Tab Take 1 Tablet by mouth every day. 100 Tablet 3    metformin (GLUCOPHAGE) 1000 MG tablet Take 1 Tablet by mouth 2 times a day with meals. 200 Tablet 3    pioglitazone (ACTOS) 15 MG Tab Take 1 Tablet by mouth every day. 100 Tablet 3    rivaroxaban (XARELTO) 20 MG Tab tablet Take 1 Tablet by mouth with dinner. 60 Tablet 11    simvastatin (ZOCOR) 20 MG Tab Take 1 Tablet by mouth every evening. 100 Tablet 3    vitamin D2, Ergocalciferol, (DRISDOL) 1.25 MG (99623 UT) Cap capsule Take 1 Capsule by mouth every 7 days. 12 Capsule 3    FREESTYLE LITE strip CHECK BLOOD SUGAR 1X PER DAY AND AS NEEDED FOR SIGN AND SYMPTOMS OF HIGH OR LOW SUGAR 100 Strip 5    Lancets Per insurance coverage. Check blood sugar 1x per day and prn ssx of high or low sugar 200 Each 0    fluocinonide (LIDEX) 0.05 % external solution Apply to affected areas of scalp BID 60 mL 3    Blood Glucose Meter Kit Freestyle Freedom Lite.  Check blood sugar  1x per day and prn ssx of high or low sugar 1 Kit 0    Alcohol Swabs Per insurance coverage. Wipe site with prep pad prior to injection 200 Each 6    pioglitazone (ACTOS) 15 MG Tab Take 1 Tablet by mouth every day. (Patient not taking: Reported on 2/24/2025)       No current facility-administered medications on file prior to visit.        Allergies: Nkda [no known drug allergy]    Current Outpatient Medications Ordered in Epic   Medication Sig Dispense Refill    Empagliflozin (JARDIANCE) 10 MG Tab tablet Take 1 Tablet by mouth every day. 90 Tablet 3    levothyroxine (SYNTHROID) 125 MCG Tab Take 1 Tablet by mouth every morning on an empty stomach. 90 Tablet 3    lisinopril (PRINIVIL) 2.5 MG Tab Take 1 Tablet by mouth every day. 100 Tablet 3    metformin (GLUCOPHAGE) 1000 MG tablet Take 1 Tablet by mouth 2 times a day with meals. 200 Tablet 3    pioglitazone (ACTOS) 15 MG Tab Take 1 Tablet by mouth every  day. 100 Tablet 3    rivaroxaban (XARELTO) 20 MG Tab tablet Take 1 Tablet by mouth with dinner. 60 Tablet 11    simvastatin (ZOCOR) 20 MG Tab Take 1 Tablet by mouth every evening. 100 Tablet 3    vitamin D2, Ergocalciferol, (DRISDOL) 1.25 MG (25987 UT) Cap capsule Take 1 Capsule by mouth every 7 days. 12 Capsule 3    FREESTYLE LITE strip CHECK BLOOD SUGAR 1X PER DAY AND AS NEEDED FOR SIGN AND SYMPTOMS OF HIGH OR LOW SUGAR 100 Strip 5    Lancets Per insurance coverage. Check blood sugar 1x per day and prn ssx of high or low sugar 200 Each 0    fluocinonide (LIDEX) 0.05 % external solution Apply to affected areas of scalp BID 60 mL 3    Blood Glucose Meter Kit Freestyle Freedom Lite.  Check blood sugar  1x per day and prn ssx of high or low sugar 1 Kit 0    Alcohol Swabs Per insurance coverage. Wipe site with prep pad prior to injection 200 Each 6    pioglitazone (ACTOS) 15 MG Tab Take 1 Tablet by mouth every day. (Patient not taking: Reported on 2/24/2025)       No current [facility]-ordered facility-administered medications on file.       Past Medical History:   Diagnosis Date    Asthma     albuterol inhaler    Cancer (HCC) 2021    Kidney    Deep vein thrombosis (HCC) [I82.409] 12/9/2016    Dental disorder     has 4 teeth    Dyslipidemia 1/12/2013    Former smoker 12/7/2016    Heart burn     High cholesterol     Hypertension     Hypothyroidism 1/12/2013    Low vitamin D level 5/3/2018    Neuropathy     left hand    Obesity (BMI 30-39.9) 5/3/2018    Renal disorder     renal mass    Runny nose     chronic, allergies    Snoring     mild    Type II or unspecified type diabetes mellitus without mention of complication, uncontrolled 1/12/2013       Past Surgical History:   Procedure Laterality Date    CERVICAL DISK AND FUSION ANTERIOR  1/11/2013    Performed by Fred Post M.D. at SUR[facility]ERY [facility]    CERVICAL DECOMPRESSION POSTERIOR  1/11/2013    Performed by Fred Post M.D. at SURGERY [facility]       Family  "History   Problem Relation Age of Onset    Stroke Mother     Cancer Father         brain        Social History     Tobacco Use   Smoking Status Former    Current packs/day: 0.00    Types: Cigarettes    Start date: 1967    Quit date: 2007    Years since quittin.1   Smokeless Tobacco Never   Tobacco Comments    off and on smoker       Social History     Substance and Sexual Activity   Alcohol Use Yes    Alcohol/week: 0.0 oz    Comment: A beer twice a year       Review of systems  As per HPI above. All other systems reviewed and negative.      Past Medical, Social, and Family history reviewed and updated in [facility]       LAB DATA:     I have independently reviewed / interpreted labs    Lab Results   Component Value Date/Time    HBA1C 7.4 (H) 10/23/2024 07:49 AM    HBA1C 6.9 (A) 2023 09:01 AM    HBA1C 7.0 (A) 10/25/2022 08:49 AM        Lab Results   Component Value Date/Time    WBC 6.3 10/23/2024 07:49 AM    HEMOGLOBIN 15.9 10/23/2024 07:49 AM    HEMATOCRIT 46.1 10/23/2024 07:49 AM    MCV 93.5 10/23/2024 07:49 AM    PLATELETCT 255 10/23/2024 07:49 AM       Lab Results   Component Value Date/Time    SODIUM 139 10/23/2024 07:49 AM    POTASSIUM 4.7 10/23/2024 07:49 AM    GLUCOSE 242 (H) 10/23/2024 07:49 AM    BUN 19 10/23/2024 07:49 AM    CREATININE 0.85 10/23/2024 07:49 AM       Lab Results   Component Value Date/Time    CHOLSTRLTOT 163 10/23/2024 07:49 AM    TRIGLYCERIDE 156 (H) 10/23/2024 07:49 AM    HDL 47 10/23/2024 07:49 AM    LDL 85 10/23/2024 07:49 AM       Lab Results   Component Value Date/Time    ALTSGPT 18 10/23/2024 07:49 AM          Objective     /68 (BP Location: Left arm, Patient Position: Sitting, BP Cuff Size: Adult)   Pulse 60   Temp 36.7 °C (98 °F) (Temporal)   Resp 16   Ht 1.727 m (5' 8\")   Wt 97.8 kg (215 lb 9.6 oz)   SpO2 97%    Body mass index is 32.78 kg/m².    General: alert in no apparent distress.  Cardiovascular: regular rate and rhythm  Pulmonary: lungs : no " wheezing   Gastrointestinal: BS present.       Assessment and Plan     1. Administrative encounter  DMV form completed for the patient today.    2. Diabetic polyneuropathy associated with type 2 diabetes mellitus (HCC)  - Empagliflozin (JARDIANCE) 10 MG Tab tablet; Take 1 Tablet by mouth every day.  Dispense: 90 Tablet; Refill: 3  - HEMOGLOBIN A1C; Future  - Basic Metabolic Panel; Future  Chronic condition.  Acceptable control per A1c 7.4%.  Due to the patient's age I am concerned about possible hypoglycemia with glimepiride.  Therefore have recommend patient to discontinue glimepiride and start Jardiance 10 mg daily.  Continue metformin 1000 Mg twice daily and Actos 15 Mg daily.  Repeat lab test in approximately 3 months.  Clinical notes:   -Discussed with the patient goals for Diabetes management:   HbA1C < 7% /  Fasting BG   /  2 hrs post meal BG below 160  -Reviewed pathophysiology of diabetes and the importance of glycemic control to reduce the risk of diabetes related complications   Microvascular: retinopathy, neuropathy, nephropathy  Macrovascular: heart attack, stroke, peripheral vascular dz  -Advised pt to monitor sugar closely  -Counseled pt the importance of recognizing and treating hypoglycemia.   -The importance of increasing physical activity to improve diabetes control  discussed with the patient.   -Patient was also educated on changing diet and making better choices to help control blood sugar.                3. Left kidney mass  Chronic condition.  Status post ablation therapy  MRI done recently as above.  Patient asymptomatic      4. Chronic deep vein thrombosis (DVT) of proximal vein of lower extremity, unspecified laterality (HCC)  This is a chronic condition.  Patient has been taking Xarelto 20 Mg daily.  - Referral to Vascular Medicine to get a recommendation regarding the appropriate duration of anticoagulation therapy    5. Acquired hypothyroidism  Stable.  Continue levothyroxine 125  mcg daily    6. Overweight  Chronic condition.  Uncontrolled per recommend diet and lifestyle modification.  Rest.  To lose weight    7. Dyslipidemia associated with type 2 diabetes mellitus (HCC)  - Lipid Profile; Future  Chronic condition.  Current status unclear.  Lipid panel requested.  Continue simvastatin 20 Mg daily    8. Hypertension associated with diabetes (HCC)  - MICROALBUMIN CREAT RATIO URINE; Future  Chronic stable.  Continue lisinopril 2.5 Mg daily.  Continue to monitor blood pressure only basis at home    9. Vitamin D deficiency  Chronic condition.  Recommend patient to continue vitamin D 50,000 unit once a week      10. Need for vaccination  - INFLUENZA VACCINE, HIGH DOSE (65+ ONLY)      Addendum note:  Patient checked with the pharmacy and the cost of Jardiance is too high for the patient to pay monthly.  The patient would like to go back to taking glimepiride 4 mg twice daily.  He will continue taking Actos and metformin.      Time spent:   43  minutes -    -Time required to fill out the DMV form    -Reviewed medical records including  October 23, 2024 medical office note  April 25, 2023 medical office note  October 25, 2022 medical office note  August 26, 2021 urology note  -Laboratory data dated January 3, 2025, December 23, 2024, October 23, 2024,  -Radiology report dated December 25, 2024, May 17, 2023, November 10, 2022    Total time includes face to face time and non-face to face time  Chart review before the visit, the actual patient visit, and time spent on documentation in the EMR after the visit.  Chart review/prep, review of other providers' records, Independent review of imagings/labs ,  time for history/examination , pt's counseling/education, ordering, prescribing, treatment plan discussed with patient, and care coordination.               Please note that this dictation was created using voice recognition software. I have made every reasonable attempt to correct obvious errors, but  I expect that there are errors of grammar and possibly content that I did not discover before finalizing the note.    Antony Alonzo MD  Internal Medicine  [facility]

## 2025-02-24 NOTE — ASSESSMENT & PLAN NOTE
Chronic condition.  Patient is status post cryotherapy 2021.  Patient denies dysuria or hematuria.    MRI completed December 2024.  Result discussed with the patient    IMPRESSION:     1.  Post ablation changes posterior LEFT mid kidney.  2.  No evidence for recurrent tumor or metastatic disease.

## 2025-02-24 NOTE — ASSESSMENT & PLAN NOTE
Chronic.  Patient currently taking simvastatin.  Previous lipid panel result discussed with the patient.

## 2025-02-26 ENCOUNTER — TELEPHONE (OUTPATIENT)
Dept: HEALTH INFORMATION MANAGEMENT | Facility: OTHER | Age: 75
End: 2025-02-26
Payer: MEDICARE

## 2025-03-14 NOTE — TELEPHONE ENCOUNTER
Patient has recently been seen by PCP within the last 6 months per protocol (3/20). Will refill DM supplies for 12 months.  Lab Results   Component Value Date/Time    HBA1C 8.9 (H) 04/15/2019 08:51 AM      Lab Results   Component Value Date/Time    MALBCRT 15 04/15/2019 08:51 AM    MICROALBUR 3.0 04/15/2019 08:51 AM      Lab Results   Component Value Date/Time    ALKPHOSPHAT 49 04/15/2019 08:51 AM    ASTSGOT 15 04/15/2019 08:51 AM    ALTSGPT 19 04/15/2019 08:51 AM    TBILIRUBIN 1.0 04/15/2019 08:51 AM         normal...

## 2025-04-18 ENCOUNTER — TELEPHONE (OUTPATIENT)
Dept: VASCULAR LAB | Facility: MEDICAL CENTER | Age: 75
End: 2025-04-18
Payer: MEDICARE

## 2025-04-18 NOTE — TELEPHONE ENCOUNTER
Called patient, was not able to reach them in regards to New Patient Appointment.    Advised of Location & Time?Yes    Voicemail or MyChart? Verbal, Spouse confirmed appointment

## 2025-04-23 ENCOUNTER — OFFICE VISIT (OUTPATIENT)
Dept: VASCULAR LAB | Facility: MEDICAL CENTER | Age: 75
End: 2025-04-23
Attending: FAMILY MEDICINE
Payer: MEDICARE

## 2025-04-23 VITALS
BODY MASS INDEX: 32.58 KG/M2 | HEIGHT: 68 IN | DIASTOLIC BLOOD PRESSURE: 62 MMHG | SYSTOLIC BLOOD PRESSURE: 117 MMHG | WEIGHT: 215 LBS | HEART RATE: 62 BPM

## 2025-04-23 DIAGNOSIS — E78.5 DYSLIPIDEMIA ASSOCIATED WITH TYPE 2 DIABETES MELLITUS (HCC): Chronic | ICD-10-CM

## 2025-04-23 DIAGNOSIS — I82.531 CHRONIC DEEP VEIN THROMBOSIS (DVT) OF RIGHT POPLITEAL VEIN (HCC): Chronic | ICD-10-CM

## 2025-04-23 DIAGNOSIS — I15.2 HYPERTENSION ASSOCIATED WITH DIABETES (HCC): Chronic | ICD-10-CM

## 2025-04-23 DIAGNOSIS — Z85.528 HISTORY OF RENAL CELL CARCINOMA: ICD-10-CM

## 2025-04-23 DIAGNOSIS — F17.201 TOBACCO USE DISORDER, SEVERE, IN SUSTAINED REMISSION: Chronic | ICD-10-CM

## 2025-04-23 DIAGNOSIS — E11.69 DYSLIPIDEMIA ASSOCIATED WITH TYPE 2 DIABETES MELLITUS (HCC): Chronic | ICD-10-CM

## 2025-04-23 DIAGNOSIS — Z79.01 CHRONIC ANTICOAGULATION: ICD-10-CM

## 2025-04-23 DIAGNOSIS — E11.59 HYPERTENSION ASSOCIATED WITH DIABETES (HCC): Chronic | ICD-10-CM

## 2025-04-23 DIAGNOSIS — I82.511 CHRONIC DEEP VEIN THROMBOSIS (DVT) OF FEMORAL VEIN OF RIGHT LOWER EXTREMITY (HCC): Chronic | ICD-10-CM

## 2025-04-23 DIAGNOSIS — E11.3393 MODERATE NONPROLIFERATIVE DIABETIC RETINOPATHY OF BOTH EYES WITHOUT MACULAR EDEMA ASSOCIATED WITH TYPE 2 DIABETES MELLITUS (HCC): ICD-10-CM

## 2025-04-23 DIAGNOSIS — E11.42 TYPE 2 DIABETES MELLITUS WITH DIABETIC POLYNEUROPATHY, WITHOUT LONG-TERM CURRENT USE OF INSULIN (HCC): ICD-10-CM

## 2025-04-23 DIAGNOSIS — E03.9 ACQUIRED HYPOTHYROIDISM: Chronic | ICD-10-CM

## 2025-04-23 PROBLEM — C64.1 MALIGNANT NEOPLASM OF RIGHT KIDNEY (HCC): Status: ACTIVE | Noted: 2025-04-23

## 2025-04-23 PROBLEM — E11.9 TYPE 2 DIABETES MELLITUS WITHOUT COMPLICATION, WITHOUT LONG-TERM CURRENT USE OF INSULIN (HCC): Status: ACTIVE | Noted: 2025-04-23

## 2025-04-23 PROCEDURE — 3074F SYST BP LT 130 MM HG: CPT | Performed by: FAMILY MEDICINE

## 2025-04-23 PROCEDURE — 99214 OFFICE O/P EST MOD 30 MIN: CPT | Performed by: FAMILY MEDICINE

## 2025-04-23 PROCEDURE — 3078F DIAST BP <80 MM HG: CPT | Performed by: FAMILY MEDICINE

## 2025-04-23 PROCEDURE — 99212 OFFICE O/P EST SF 10 MIN: CPT

## 2025-04-23 RX ORDER — OFLOXACIN 3 MG/ML
SOLUTION/ DROPS OPHTHALMIC
COMMUNITY
Start: 2025-04-10

## 2025-04-23 RX ORDER — PREDNISOLONE ACETATE 10 MG/ML
SUSPENSION/ DROPS OPHTHALMIC
COMMUNITY
Start: 2025-02-17

## 2025-04-23 ASSESSMENT — ENCOUNTER SYMPTOMS
HEMOPTYSIS: 0
CHILLS: 0
BLOOD IN STOOL: 0
SPUTUM PRODUCTION: 0
PALPITATIONS: 0
PND: 0
COUGH: 0
SHORTNESS OF BREATH: 0
FEVER: 0
BRUISES/BLEEDS EASILY: 0
WHEEZING: 0
ORTHOPNEA: 0
CLAUDICATION: 0

## 2025-04-23 NOTE — PROGRESS NOTES
INITIAL VISIT - VASCULAR MED ANTICOAGULATION CLINIC   25     Ravindra Palma is a 75 y.o. 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     Subjective      Barriers to care/SDOH: limited mobility, using cane     VTE disease / Antithrombotic tx: Denies current SOB, CP, leg swelling, edema, leg pain, cyanosis of digits, redness of extremities.  Current agent: XARELTO 20mg - adherence and tolerance good, no bleeding  VTE PMHX / PROVOKING FACTORS REVIEW:  Date of initiation of AC: before  - cannot recall   Date of Diagnosis and type: same - acute RLE fem to pop v DVT   Initial visit hx/sx:  had acute RLE swelling and found to have DVT.    Tx course: Xarelto 15mg BID x 21d, then 20mg daily to date    Personal VTE hx? No  Family VTE hx or clotting d/o? No  Recent surgery ? No  Recent trauma ? No  Smoker?  reports that he quit smoking about 18 years ago. His smoking use included cigarettes. He started smoking about 58 years ago. He has never used smokeless tobacco.    Extended travel? No  Recent periods of immobility? No  Prothrombotic meds:  no  Prothrombotic medical conditions (rheum, IBD, other): no  Other risk factors:  no  MEN:  Hx of cancer or abnormal cancer screenings: hx of R RCC, s/p cryoablation , remains unclear if active CA at time of dx of DVT   Testosterone therapy: no  Hypercoagulability work-up completed?  no    OTHER CVD: no    HTN: Stable, tolerating meds with good adherence, Home BP los/70s    HLD: Stable, current treatment: Moderate intensity statin - tolerating, good adherence     Dysglycemia: yes, T2D    Patient Active Problem List   Diagnosis    Cervical stenosis of spine    Spinal stenosis in cervical region    Hypothyroidism    Overweight    Lipoma    Leg pain    Dyslipidemia associated with type 2 diabetes mellitus (HCC)    Hypertension associated with diabetes (HCC)    Vitamin D  deficiency    Diabetic neuropathy associated with type 2 diabetes mellitus (HCC)    Left kidney mass    Seborrheic dermatitis    Chronic deep vein thrombosis (DVT) (HCC)    Need for vaccination    Eczema    Diabetic retinopathy of both eyes without macular edema associated with type 2 diabetes mellitus (HCC)    Administrative encounter    Chronic deep vein thrombosis (DVT) of right popliteal vein (HCC)    Malignant neoplasm of right kidney (HCC)    History of renal cell carcinoma    Chronic anticoagulation    Type 2 diabetes mellitus without complication, without long-term current use of insulin (HCC)    Tobacco use disorder, severe, in sustained remission      Past Surgical History:   Procedure Laterality Date    CERVICAL DISK AND FUSION ANTERIOR  1/11/2013    Performed by Fred Post M.D. at [facility]    CERVICAL DECOMPRESSION POSTERIOR  1/11/2013    Performed by Fred Post M.D. at [facility]     Current Outpatient Medications on File Prior to Visit   Medication Sig Dispense Refill    ofloxacin (OCUFLOX) 0.3 % Solution       prednisoLONE acetate (PRED FORTE) 1 % Suspension 1 DROP RIGHT EYE FOUR TIMES PER DAY START TWO DAYS PRIOR TO SURGERY AND CONTINUE AS DIRECTED      glimepiride (AMARYL) 4 MG Tab Take 1 Tablet by mouth 2 times a day. 200 Tablet 3    levothyroxine (SYNTHROID) 125 MCG Tab Take 1 Tablet by mouth every morning on an empty stomach. 90 Tablet 3    lisinopril (PRINIVIL) 2.5 MG Tab Take 1 Tablet by mouth every day. 100 Tablet 3    metformin (GLUCOPHAGE) 1000 MG tablet Take 1 Tablet by mouth 2 times a day with meals. 200 Tablet 3    pioglitazone (ACTOS) 15 MG Tab Take 1 Tablet by mouth every day. 100 Tablet 3    simvastatin (ZOCOR) 20 MG Tab Take 1 Tablet by mouth every evening. 100 Tablet 3    vitamin D2, Ergocalciferol, (DRISDOL) 1.25 MG (28146 UT) Cap capsule Take 1 Capsule by mouth every 7 days. 12 Capsule 3    FREESTYLE LITE strip CHECK BLOOD SUGAR 1X PER DAY AND AS  NEEDED FOR SIGN AND SYMPTOMS OF HIGH OR LOW SUGAR 100 Strip 5    Lancets Per insurance coverage. Check blood sugar 1x per day and prn ssx of high or low sugar 200 Each 0    fluocinonide (LIDEX) 0.05 % external solution Apply to affected areas of scalp BID 60 mL 3    Blood Glucose Meter Kit Freestyle Freedom Lite.  Check blood sugar  1x per day and prn ssx of high or low sugar 1 Kit 0    Alcohol Swabs Per insurance coverage. Wipe site with prep pad prior to injection 200 Each 6    pioglitazone (ACTOS) 15 MG Tab Take 1 Tablet by mouth every day. (Patient not taking: Reported on 2025)       No current facility-administered medications on file prior to visit.      Allergies   Allergen Reactions    Nkda [No Known Drug Allergy]        Family History   Problem Relation Age of Onset    Stroke Mother     Cancer Father         brain      Social History     Tobacco Use    Smoking status: Former     Current packs/day: 0.00     Types: Cigarettes     Start date: 1967     Quit date: 2007     Years since quittin.3    Smokeless tobacco: Never    Tobacco comments:     off and on smoker   Vaping Use    Vaping status: Never Used   Substance Use Topics    Alcohol use: Yes     Alcohol/week: 0.0 oz     Comment: A beer twice a year    Drug use: No       DIET AND EXERCISE:  Weight Change:stable   Diet: common adult  Exercise: no regular exercise program     Review of Systems   Constitutional:  Negative for chills, fever and malaise/fatigue.   HENT:  Negative for nosebleeds.    Respiratory:  Negative for cough, hemoptysis, sputum production, shortness of breath and wheezing.    Cardiovascular:  Negative for chest pain, palpitations, orthopnea, claudication, leg swelling and PND.   Gastrointestinal:  Negative for blood in stool and melena.   Genitourinary:  Negative for hematuria.   Endo/Heme/Allergies:  Does not bruise/bleed easily.         Objective    Vitals:    25 1349   BP: 117/62   BP Location: Left arm   Patient  "Position: Sitting   BP Cuff Size: Adult long   Pulse: 62   Weight: 97.5 kg (215 lb)   Height: 1.727 m (5' 8\")      BP Readings from Last 5 Encounters:   04/23/25 117/62   02/24/25 130/68   10/23/24 124/68   04/25/23 124/70   10/25/22 118/72      Body mass index is 32.69 kg/m².  Wt Readings from Last 3 Encounters:   04/23/25 97.5 kg (215 lb)   02/24/25 97.8 kg (215 lb 9.6 oz)   10/23/24 101 kg (223 lb 11.2 oz)      Physical Exam  Vitals reviewed.   Constitutional:       Appearance: Normal appearance.   HENT:      Head: Normocephalic and atraumatic.      Nose: Nose normal.      Mouth/Throat:      Mouth: Mucous membranes are moist.      Pharynx: Oropharynx is clear.   Eyes:      Extraocular Movements: Extraocular movements intact.      Conjunctiva/sclera: Conjunctivae normal.   Cardiovascular:      Rate and Rhythm: Normal rate and regular rhythm.      Pulses: Normal pulses.           Carotid pulses are 2+ on the right side and 2+ on the left side.       Radial pulses are 2+ on the right side and 2+ on the left side.        Dorsalis pedis pulses are 2+ on the right side and 2+ on the left side.        Posterior tibial pulses are 2+ on the right side and 2+ on the left side.      Heart sounds: Normal heart sounds.      Comments:  Stemmer's negative bilat   No abd wall varicosities noted     Spider telangectasia:       RLE:  None      LLE: none   Varicosities:           RLE: none      LLE: none   Corona phlebectatica:      RLE:  None        LLE:  None   Cording:         RLE:  None     LLE: None   Pulmonary:      Effort: Pulmonary effort is normal.      Breath sounds: Normal breath sounds.   Musculoskeletal:         General: Normal range of motion.      Cervical back: Normal range of motion and neck supple.      Right lower leg: No edema.      Left lower leg: No edema.   Skin:     General: Skin is warm and dry.      Capillary Refill: Capillary refill takes less than 2 seconds.      Comments: No stasis pigmentation or " "dermatitis   No lipodermatosclerotic or atrophe alejandra changes  No healed or active VLUs   Neurological:      General: No focal deficit present.      Mental Status: He is alert and oriented to person, place, and time. Mental status is at baseline.   Psychiatric:         Mood and Affect: Mood normal.         Behavior: Behavior normal.         LABS:  Lab Results   Component Value Date    CHOLSTRLTOT 163 10/23/2024    LDL 85 10/23/2024    HDL 47 10/23/2024    TRIGLYCERIDE 156 (H) 10/23/2024      Lab Results   Component Value Date/Time    LDL 85 10/23/2024 07:49 AM    LDL 64 2023 10:04 AM    LDL 62 10/25/2022 10:20 AM    LDL 79 2021 09:53 AM    LDL 58 10/19/2020 10:47 AM       No results found for: \"LIPOPROTA\"   No results found for: \"APOB\"   No results found for: \"CRPHIGHSEN\"           Lab Results   Component Value Date    HBA1C 7.4 (H) 10/23/2024      Lab Results   Component Value Date/Time    MALBCRT 26 10/23/2024 07:50 AM    MICROALBUR 6.7 10/23/2024 07:50 AM      Lab Results   Component Value Date    SODIUM 139 10/23/2024    POTASSIUM 4.7 10/23/2024    CHLORIDE 101 10/23/2024    CO2 27 10/23/2024    GLUCOSE 242 (H) 10/23/2024    BUN 19 10/23/2024    CREATININE 0.85 10/23/2024      Lab Results   Component Value Date/Time    ALKPHOSPHAT 55 2020 09:06 AM    ASTSGOT 25 2020 09:06 AM    ALTSGPT 18 10/23/2024 07:49 AM    TBILIRUBIN 0.8 2020 09:06 AM         Lab Results   Component Value Date    WBC 6.3 10/23/2024    RBC 4.93 10/23/2024    HEMOGLOBIN 15.9 10/23/2024    HEMATOCRIT 46.1 10/23/2024    MCV 93.5 10/23/2024    MCH 32.3 10/23/2024    MCHC 34.5 10/23/2024    MPV 10.9 10/23/2024        IMAGIN RLE venous  1.  No evidence of acute deep vein thrombosis in right lower extremity.   2.  Chronic appearing thrombus is identified in the distal femoral vein and popliteal vein.    2018 RLE venous   1.  No evidence of acute right lower extremity deep venous thrombosis.   2.  Chronic " thrombus in the distal femoral vein and popliteal vein. No change from prior exam.    6/2021 RLE venous   1.  No evidence of acute right  lower extremity deep venous thrombosis.   2.  There is no interval change in the chronic thrombus in the distal right femoral vein and popliteal vein.    2023 AAA duplex   1.  Normal abdominal aortic ultrasound for age.    12/2024 MR abd   1.  Post ablation changes posterior LEFT mid kidney.  2.  No evidence for recurrent tumor or metastatic disease.      PROCEDURES:  none         Medical Decision Making:  Today's Assessment / Status / Plan:     1. Chronic deep vein thrombosis (DVT) of femoral vein of right lower extremity (HCC)  rivaroxaban (XARELTO) 10 MG Tab tablet    Referral to Anticoagulation Monitoring    Basic Metabolic Panel    CBC WITHOUT DIFFERENTIAL      2. Chronic deep vein thrombosis (DVT) of right popliteal vein (HCC)  rivaroxaban (XARELTO) 10 MG Tab tablet    Referral to Anticoagulation Monitoring      3. Dyslipidemia associated with type 2 diabetes mellitus (HCC)        4. Hypertension associated with diabetes (HCC)        5. Diabetic retinopathy of both eyes without macular edema associated with type 2 diabetes mellitus (HCC)        6. Type 2 diabetes mellitus without complication, without long-term current use of insulin (HCC)        7. Acquired hypothyroidism        8. Chronic anticoagulation  rivaroxaban (XARELTO) 10 MG Tab tablet    Referral to Anticoagulation Monitoring    Basic Metabolic Panel    CBC WITHOUT DIFFERENTIAL      9. History of renal cell carcinoma        10. Tobacco use disorder, severe, in sustained remission          Established CVD:     1) VTE disease - stable, no current sx   2017 (?) - acute, unprovoked, RLE fem to pop v DVT, with transition to chronic residua per multiple repeat duplex scans over time   - unclear if renal cell CA in 2017, had cryoblation 2022 and deemed cancer -free   - Thrombophilia/hypercoag evaluation:  not  recommended  - at initial visit gave [facility] pt info handouts on VTE, DOAC  - Per meta-analysis/systemic analysis (BMJ 2019), provided data on risks for recurrent VTE in patients with unprovoked VTE whose anticoagulation was stopped after at least 3 months:   - Cumulative incidence rates of recurrent VTE:  10% (1yr), 16% (2yrs), 25% (5 yrs), 36% (10ys)  - Cumulative incidence of fatal PE::  0.4% (1yr), 0.7% (2yrs), 1.0% (5yrs), 1.5% (10yrs)  - 10-year cumulative incidence lower in women than men (29% vs 41%)   - Recurrent VTE risk over 2 years in pts with proximal DVT (with or w/o PE) (17% vs 12%)  - Recurrent VTE risk over 1 year in pts with isolated distal DVT = only 2%    PLAN  - no imaging surveillance needed at this time - will not help with decision-making   - antithrombotic plan as noted below   - counseled on signs and symptoms of acute VTE that require seeking prompt attention in the ED including shortness of breath, chest pain, pain with deep inhalation, acute leg swelling and/or pain in calf or leg   - elevate legs as much as possible, use compression stockings/socks if directed by your provider  - avoid hormonal therapies containing E2 or testosterone, raloxifene, tamoxifene, and other meds increasing risk for VTE   - avoid or limit sedentary periods  - continue complete avoidance of tobacco products  - if having any invasive procedure, advised to inform provider of history of blood clots and current anticoagulation status  - recommend f/u with PCP for age-appropriate cancer-screening due to risk of malig-associated VTE    ANTITHROMBOTIC THERAPY  Date of initiation: pre-2017  HAS-BLED bleeding risk calc ([email or web address]): 1 pt, 3.4%, low risk  Factors to consider for indefinite OAC: Unprovoked VTE event, chronic residua from prior unprovoked raises recurrence rate by 50% if off OAC   Last CBC, BMP: reviewed above   Expected LOT: reviewed standard of care as per Chest 2021 guidelines is 3-6 months minimum on full  "dose OAC.  After review of risks/benefits/alternatives, through shared decision-making, we will continue indefinite DOAC therapy with transition to reduced dose as per majority of guidelines and expert consensus -- provides equal VTE risk reduction with lower risk for major bleeding  - if new acute on chronic vs new acute VTE occurrence, then will increase to to full dose  PLAN:  - reduce xarelto to 10mg daily and continue indefinitely, annual risk/benefit review   - ref to AC clinic for ongoing mgmt and prescribing   - stressed strict adherence to tx   - q6mo CBC, BMP (monitor CrCl) while on DOAC   - avoid Aspirin and NSAIDs while anticoagulated   - limit or avoid sports or high-risk for head injury activities to reduce risk for intracranial bleeds  - advised to seek urgent eval for any GI bleeding, stroke-like sx, or minor bleeding >30min duration   - advised reversal agents are available for all agents     LIPID MANAGEMENT  Qualifies for Statin Therapy per ACC/AHA Guidelines: yes, Diabetes and Long duration (T2D >10y, T1D >20y) and Neuropathy  The 10-year ASCVD risk score (Guzman LAGUNA, et al., 2019) is: 45.4%, >20% \"high risk\"   Major ASCVD events: None    High-risk conditions: N/A  Risk-enhancers: N/A  Lipoprotein(a): Not available  Currently on Statin: Yes  Tx goals: LDL-C <70   At goal? No, 10/2024 but prior had been controlled   PLAN  - reinforced ongoing lifestyle measures as noted   - monitor labs   Meds:   - continue simva 20mg daily, consider transition to high potency rosuva vs atorva in LDL remains above goal      BLOOD PRESSURE CONTROL   Office BP ACC/AHA goal <130/80  Home BP at goal:  yes  Office BP at goal:  yes  24h ABPM:  not ordered to date   Plan:   - start/continue home BP monitoring, reviewed correct technique, provide BP log and instructions  - order 24h ABPM:  NO  - routine monitoring of lytes/gfr   Medications:  - continue lisinopril 2.5mg daily     GLYCEMIC STATUS:  Diabetic, T2 with HLD, " HTN, neuropathy   Goal A1c < 7.5 reasonable due to age   Lab Results   Component Value Date/Time    HBA1C 7.4 (H) 10/23/2024 07:49 AM    HBA1C 6.9 (A) 04/25/2023 09:01 AM    HBA1C 7.0 (A) 10/25/2022 08:49 AM       Lab Results   Component Value Date/Time    MALBCRT 26 10/23/2024 07:50 AM    MICROALBUR 6.7 10/23/2024 07:50 AM     Plan:  - continue current medication plan   - recommmend for routine care with PCP (or endocrine) to include regular A1c monitoring, annual albumin/creatinine ratio (ACR), annual diabetic retinopathy screening, foot exams, annual flu vaccine, and updates to pneumonia vaccines as appropriate      LIFESTYLE INTERVENTIONS  TOBACCO: Stages of Change: Maintenance (sustained change >6mo)    reports that he quit smoking about 18 years ago. His smoking use included cigarettes. He started smoking about 58 years ago. He has never used smokeless tobacco.   - continued complete avoidance of all tobacco products     PHYSICAL ACTIVITY: >150min/week of mod-intensity activity or as much as tolerated    NUTRITION: Diabetic diet - reduce CHO and kcal      ETOH: does not drink    WT MGMT: focus on 5-7% reduction as initial goal  (1kg loss reduces SBP by 1 mmHg)  - consider use of various wt reducing interventions and average wt loss potential:   Baseline diet/exercise (5-7+%)   Plus low intensity (phentermine, qsymia, contrave - 5-10+%)   And/or high-intensity meds (liraglutide (5-10%), semaglutide (12-15%), tirzepatide (15-20%))   And/org wt-reducing surgery (20-30+%)  - suggested for ongoing f/u with PCP to review above     OTHER:     # hx RCC, s/p cryoablation (2022) - stable w/o recurrence or mets per scans   - defer to urology and onc teams     STUDIES:  none   LABS:  q6mo CBC, BMP   FOLLOW-UP:  q6mo with AC clinic - defer refills to them       Kalia Henderson M.D.  [facility]   [facility]   [phone]

## 2025-05-01 ENCOUNTER — TELEPHONE (OUTPATIENT)
Dept: HEALTH INFORMATION MANAGEMENT | Facility: OTHER | Age: 75
End: 2025-05-01
Payer: MEDICARE

## 2025-06-23 ENCOUNTER — HOSPITAL ENCOUNTER (OUTPATIENT)
Dept: LAB | Facility: MEDICAL CENTER | Age: 75
End: 2025-06-23
Attending: INTERNAL MEDICINE
Payer: MEDICARE

## 2025-06-23 DIAGNOSIS — I15.2 HYPERTENSION ASSOCIATED WITH DIABETES (HCC): Chronic | ICD-10-CM

## 2025-06-23 DIAGNOSIS — E11.42 DIABETIC POLYNEUROPATHY ASSOCIATED WITH TYPE 2 DIABETES MELLITUS (HCC): Chronic | ICD-10-CM

## 2025-06-23 DIAGNOSIS — E11.69 DYSLIPIDEMIA ASSOCIATED WITH TYPE 2 DIABETES MELLITUS (HCC): Chronic | ICD-10-CM

## 2025-06-23 DIAGNOSIS — E78.5 DYSLIPIDEMIA ASSOCIATED WITH TYPE 2 DIABETES MELLITUS (HCC): Chronic | ICD-10-CM

## 2025-06-23 DIAGNOSIS — E11.59 HYPERTENSION ASSOCIATED WITH DIABETES (HCC): Chronic | ICD-10-CM

## 2025-06-23 LAB
ANION GAP SERPL CALC-SCNC: 10 MMOL/L (ref 7–16)
BUN SERPL-MCNC: 17 MG/DL (ref 8–22)
CALCIUM SERPL-MCNC: 9.1 MG/DL (ref 8.5–10.5)
CHLORIDE SERPL-SCNC: 105 MMOL/L (ref 96–112)
CHOLEST SERPL-MCNC: 113 MG/DL (ref 100–199)
CO2 SERPL-SCNC: 25 MMOL/L (ref 20–33)
CREAT SERPL-MCNC: 0.96 MG/DL (ref 0.5–1.4)
CREAT UR-MCNC: 268 MG/DL
EST. AVERAGE GLUCOSE BLD GHB EST-MCNC: 146 MG/DL
GFR SERPLBLD CREATININE-BSD FMLA CKD-EPI: 81 ML/MIN/1.73 M 2
GLUCOSE SERPL-MCNC: 169 MG/DL (ref 65–99)
HBA1C MFR BLD: 6.7 % (ref 4–5.6)
HDLC SERPL-MCNC: 47 MG/DL
LDLC SERPL CALC-MCNC: 50 MG/DL
MICROALBUMIN UR-MCNC: 5.4 MG/DL
MICROALBUMIN/CREAT UR: 20 MG/G (ref 0–30)
POTASSIUM SERPL-SCNC: 4.1 MMOL/L (ref 3.6–5.5)
SODIUM SERPL-SCNC: 140 MMOL/L (ref 135–145)
TRIGL SERPL-MCNC: 82 MG/DL (ref 0–149)

## 2025-06-23 PROCEDURE — 80061 LIPID PANEL: CPT

## 2025-06-23 PROCEDURE — 83036 HEMOGLOBIN GLYCOSYLATED A1C: CPT

## 2025-06-23 PROCEDURE — 36415 COLL VENOUS BLD VENIPUNCTURE: CPT

## 2025-06-23 PROCEDURE — 80048 BASIC METABOLIC PNL TOTAL CA: CPT

## 2025-06-23 PROCEDURE — 82043 UR ALBUMIN QUANTITATIVE: CPT

## 2025-06-23 PROCEDURE — 82570 ASSAY OF URINE CREATININE: CPT

## 2025-06-24 ENCOUNTER — RESULTS FOLLOW-UP (OUTPATIENT)
Dept: MEDICAL GROUP | Facility: PHYSICIAN GROUP | Age: 75
End: 2025-06-24

## 2025-06-25 ENCOUNTER — OFFICE VISIT (OUTPATIENT)
Dept: MEDICAL GROUP | Facility: PHYSICIAN GROUP | Age: 75
End: 2025-06-25
Payer: MEDICARE

## 2025-06-25 VITALS
DIASTOLIC BLOOD PRESSURE: 74 MMHG | HEART RATE: 68 BPM | TEMPERATURE: 98.5 F | WEIGHT: 214 LBS | SYSTOLIC BLOOD PRESSURE: 132 MMHG | OXYGEN SATURATION: 97 % | RESPIRATION RATE: 16 BRPM | HEIGHT: 68 IN | BODY MASS INDEX: 32.43 KG/M2

## 2025-06-25 DIAGNOSIS — E11.69 DYSLIPIDEMIA ASSOCIATED WITH TYPE 2 DIABETES MELLITUS (HCC): Chronic | ICD-10-CM

## 2025-06-25 DIAGNOSIS — Z23 NEED FOR VACCINATION: Chronic | ICD-10-CM

## 2025-06-25 DIAGNOSIS — E55.9 VITAMIN D DEFICIENCY: Chronic | ICD-10-CM

## 2025-06-25 DIAGNOSIS — E11.42 DIABETIC POLYNEUROPATHY ASSOCIATED WITH TYPE 2 DIABETES MELLITUS (HCC): Primary | Chronic | ICD-10-CM

## 2025-06-25 DIAGNOSIS — Z85.528 HISTORY OF RENAL CELL CARCINOMA: ICD-10-CM

## 2025-06-25 DIAGNOSIS — E66.3 OVERWEIGHT: Chronic | ICD-10-CM

## 2025-06-25 DIAGNOSIS — E11.3393 MODERATE NONPROLIFERATIVE DIABETIC RETINOPATHY OF BOTH EYES WITHOUT MACULAR EDEMA ASSOCIATED WITH TYPE 2 DIABETES MELLITUS (HCC): ICD-10-CM

## 2025-06-25 DIAGNOSIS — I82.5Y9 CHRONIC DEEP VEIN THROMBOSIS (DVT) OF PROXIMAL VEIN OF LOWER EXTREMITY, UNSPECIFIED LATERALITY (HCC): Chronic | ICD-10-CM

## 2025-06-25 DIAGNOSIS — E11.59 HYPERTENSION ASSOCIATED WITH DIABETES (HCC): Chronic | ICD-10-CM

## 2025-06-25 DIAGNOSIS — E03.9 ACQUIRED HYPOTHYROIDISM: Chronic | ICD-10-CM

## 2025-06-25 DIAGNOSIS — M17.0 PRIMARY OSTEOARTHRITIS OF BOTH KNEES: ICD-10-CM

## 2025-06-25 DIAGNOSIS — I15.2 HYPERTENSION ASSOCIATED WITH DIABETES (HCC): Chronic | ICD-10-CM

## 2025-06-25 DIAGNOSIS — E78.5 DYSLIPIDEMIA ASSOCIATED WITH TYPE 2 DIABETES MELLITUS (HCC): Chronic | ICD-10-CM

## 2025-06-25 PROBLEM — F17.201 TOBACCO USE DISORDER, SEVERE, IN SUSTAINED REMISSION: Chronic | Status: RESOLVED | Noted: 2025-04-23 | Resolved: 2025-06-25

## 2025-06-25 PROBLEM — E11.9 TYPE 2 DIABETES MELLITUS WITHOUT COMPLICATION, WITHOUT LONG-TERM CURRENT USE OF INSULIN (HCC): Status: RESOLVED | Noted: 2025-04-23 | Resolved: 2025-06-25

## 2025-06-25 PROBLEM — I82.531 CHRONIC DEEP VEIN THROMBOSIS (DVT) OF RIGHT POPLITEAL VEIN (HCC): Status: RESOLVED | Noted: 2025-04-23 | Resolved: 2025-06-25

## 2025-06-25 PROBLEM — C64.1 MALIGNANT NEOPLASM OF RIGHT KIDNEY (HCC): Status: RESOLVED | Noted: 2025-04-23 | Resolved: 2025-06-25

## 2025-06-25 PROBLEM — Z79.01 CHRONIC ANTICOAGULATION: Chronic | Status: ACTIVE | Noted: 2025-04-23

## 2025-06-25 PROBLEM — F17.201 TOBACCO USE DISORDER, SEVERE, IN SUSTAINED REMISSION: Chronic | Status: ACTIVE | Noted: 2025-04-23

## 2025-06-26 NOTE — ASSESSMENT & PLAN NOTE
Chronic.  Patient is taking lisinopril.  Patient denies chest pain shortness of breath or palpitation.

## 2025-06-26 NOTE — ASSESSMENT & PLAN NOTE
Condition.  Patient has been followed by ophthalmology service on regular basis.  Patient denies significant change in his vision.

## 2025-06-26 NOTE — ASSESSMENT & PLAN NOTE
This is chronic.  Patient has been taking vitamin D supplementation 50,000 unit once a week.  Patient tolerating medication well.  No new symptoms reported.

## 2025-06-26 NOTE — ASSESSMENT & PLAN NOTE
Chronic condition   patient followed by vascular medicine specialist.  Specialist Recommended for the patient to take anticoagulation therapy indefinitely 10 mg.  No history of bleeding reported.

## 2025-06-26 NOTE — ASSESSMENT & PLAN NOTE
The patient with history of renal cell carcinoma status post cryoablation therapy of the left kidney.  Patient denies dysuria or hematuria.  GFR 81.

## 2025-06-26 NOTE — ASSESSMENT & PLAN NOTE
Chronic condition.  Patient has been taking levothyroxine 125 mcg daily.  Lab test result discussed with the patient.  Patient tolerating medication well.

## 2025-06-26 NOTE — ASSESSMENT & PLAN NOTE
Body mass index is 32.54 kg/m².   Chronic condition.  Recommend pt to follow a healthy , well balance diet  Pt to continue with regular exercise activity and to maintain ideal weight.

## 2025-06-26 NOTE — PROGRESS NOTES
PRIMARY CARE CLINIC VISIT        Chief Complaint   Patient presents with    Follow-Up     3 month follow up    Follow-up diabetes  Test results  Chronic DVT  Diabetic retinopathy  Hypothyroidism  Overweight  Hyperlipidemia  Hypertension  Vitamin D deficiency  History of renal cell carcinoma  Osteoarthritis of both knees  Condition status  Medication review    History of Present Illness     Diabetic neuropathy associated with type 2 diabetes mellitus (HCC)  This is chronic condition.  Patient presently taking glimepiride 4 Mg twice daily, metformin 1000 Mg twice daily and Actos 15 Mg daily.  The patient declined Jardiance due to high cost of the medication.  Recent A1c 0.7%.  The patient denies significant hypo or hyperglycemia.  No other side effects reported.    Chronic deep vein thrombosis (DVT) (HCC)  Chronic condition   patient followed by vascular medicine specialist.  Specialist Recommended for the patient to take anticoagulation therapy indefinitely 10 mg.  No history of bleeding reported.    Diabetic retinopathy of both eyes without macular edema associated with type 2 diabetes mellitus (HCC)  Condition.  Patient has been followed by ophthalmology service on regular basis.  Patient denies significant change in his vision.    History of renal cell carcinoma  The patient with history of renal cell carcinoma status post cryoablation therapy of the left kidney.  Patient denies dysuria or hematuria.  GFR 81.    Hypothyroidism  Chronic condition.  Patient has been taking levothyroxine 125 mcg daily.  Lab test result discussed with the patient.  Patient tolerating medication well.    Overweight  Body mass index is 32.54 kg/m².   Chronic condition.  Recommend pt to follow a healthy , well balance diet  Pt to continue with regular exercise activity and to maintain ideal weight.        Dyslipidemia associated with type 2 diabetes mellitus (HCC)  This is chronic.  The patient is taking simvastatin daily.  Tolerating  medication well.  Lab test result reviewed with the patient.    Hypertension associated with diabetes (HCC)  Chronic.  Patient is taking lisinopril.  Patient denies chest pain shortness of breath or palpitation.    Vitamin D deficiency  This is chronic.  Patient has been taking vitamin D supplementation 50,000 unit once a week.  Patient tolerating medication well.  No new symptoms reported.    Need for vaccination  Recommended Pt to get shingles vaccine from the pharmacy as this is not available in the office.      Osteoarthritis of both knees  Chronic condition.  The patient would osteoarthritis of both knees.  He is walking using a cane.  The patient denies recent trauma or injury denies motor weakness or paresthesia.    Medications Ordered Prior to Encounter[1]     Allergies: Nkda [no known drug allergy]    Current Medications and Prescriptions Ordered in Epic[2]    Past Medical History[3]    Past Surgical History[4]    Family History   Problem Relation Age of Onset    Stroke Mother     Cancer Father         brain        Tobacco Use History[5]    Social History     Substance and Sexual Activity   Alcohol Use Yes    Alcohol/week: 0.0 oz    Comment: A beer twice a year       Review of systems  As per HPI above. All other systems reviewed and negative.      Past Medical, Social, and Family history reviewed and updated in [facility]       LAB DATA:     I have independently reviewed / interpreted labs    Lab Results   Component Value Date/Time    HBA1C 6.7 (H) 06/23/2025 11:57 AM    HBA1C 7.4 (H) 10/23/2024 07:49 AM    HBA1C 6.9 (A) 04/25/2023 09:01 AM        Lab Results   Component Value Date/Time    WBC 6.3 10/23/2024 07:49 AM    HEMOGLOBIN 15.9 10/23/2024 07:49 AM    HEMATOCRIT 46.1 10/23/2024 07:49 AM    MCV 93.5 10/23/2024 07:49 AM    PLATELETCT 255 10/23/2024 07:49 AM       Lab Results   Component Value Date/Time    SODIUM 140 06/23/2025 11:57 AM    POTASSIUM 4.1 06/23/2025 11:57 AM    GLUCOSE 169 (H) 06/23/2025 11:57  "AM    BUN 17 06/23/2025 11:57 AM    CREATININE 0.96 06/23/2025 11:57 AM       Lab Results   Component Value Date/Time    CHOLSTRLTOT 113 06/23/2025 11:57 AM    TRIGLYCERIDE 82 06/23/2025 11:57 AM    HDL 47 06/23/2025 11:57 AM    LDL 50 06/23/2025 11:57 AM       Lab Results   Component Value Date/Time    ALTSGPT 18 10/23/2024 07:49 AM          Objective     /74 (BP Location: Right arm, Patient Position: Sitting, BP Cuff Size: Adult)   Pulse 68   Temp 36.9 °C (98.5 °F) (Temporal)   Resp 16   Ht 1.727 m (5' 8\")   Wt 97.1 kg (214 lb)   SpO2 97%    Body mass index is 32.54 kg/m².    General: alert in no apparent distress.  Cardiovascular: regular rate and rhythm  Pulmonary: lungs : no wheezing   Gastrointestinal: BS present.   Knees no significant swelling noted.  Range of motion of the knees are limited due to pain especially with knee flexion and extension.    Assessment and Plan     1. Diabetic polyneuropathy associated with type 2 diabetes mellitus (HCC)  - HEMOGLOBIN A1C; Future  - Basic Metabolic Panel; Future  Chronic condition.  Excellent control.  A1c 6.7% as above.  Due to advanced age I did previously recommend for the patient to discontinue glimepiride however the patient reported that Jardiance cost was too high  Denies significant side effects of current medication.  Patient to continue glimepiride 4 mg twice daily.  Metformin 1000 Mg twice daily and Actos 15 Mg daily.  Clinical notes:   -Discussed with the patient goals for Diabetes management:   HbA1C < 7% /  Fasting BG   /  2 hrs post meal BG below 160  -Reviewed pathophysiology of diabetes and the importance of glycemic control to reduce the risk of diabetes related complications   Microvascular: retinopathy, neuropathy, nephropathy  Macrovascular: heart attack, stroke, peripheral vascular dz  -Advised pt to monitor sugar closely  -Counseled pt the importance of recognizing and treating hypoglycemia.   -The importance of increasing " physical activity to improve diabetes control  discussed with the patient.   -Patient was also educated on changing diet and making better choices to help control blood sugar.         2. Chronic deep vein thrombosis (DVT) of proximal vein of lower extremity, unspecified laterality (HCC)  - CBC WITH DIFFERENTIAL; Future  Chronic stable.  Continue with Xarelto 10 mg daily.  Monitor for signs of bleeding.  Patient asymptomatic.  Patient tolerating medication well      3. 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  Patient to continue follow-up with ophthalmology service as directed    4. Acquired hypothyroidism  - TSH WITH REFLEX TO FT4; Future  - FREE THYROXINE; Future  Chronic condition.  Current status unclear.  Lab test requested to check TSH.  Continue levothyroxine 25 mcg daily    5. Overweight  Chronic condition.  Unstable.  Recommend healthy diet and exercise.  Patient will try to lose weight    6. Dyslipidemia associated with type 2 diabetes mellitus (HCC)  - ALANINE AMINO-TRANS; Future  - Lipid Profile; Future  Chronic condition.  Stable..  Lipid panel normal.  Continue simvastatin 20 Mg daily      7. Hypertension associated with diabetes (HCC)  - MICROALBUMIN CREAT RATIO URINE; Future  Chronic stable condition.  Continue lisinopril 2.5 Mg daily.  Continue to monitor blood pressure on regular basis at home    8. Vitamin D deficiency  Chronic condition.  Stable.  Continue vitamin D supplementation 50 unit once a week    9. History of renal cell carcinoma  Chronic.  Condition.  Status post cryotherapy.  Asymptomatic.  Advised the patient to follow-up with urologist.  Continue to monitor GFR.    10. Primary osteoarthritis of both knees  Chronic condition.  Stable.  Patient to continue using cane for mobility.  Patient may take Tylenol 500 mg 3 times a day as needed.    11. Need for vaccination  Pt To get shingle vaccine at the local  pharmacy.      Recommend follow-up in 6 months with lab test prior        Time spent:   42   minutes     Reviewed medical records including:  April 23, 2025 vascular medicine note  5.24.25 medical office note  October 23, 2024 medical office note  April 25, 2023 medical office note  Lab data June 23, 2025, October 23, 2024, October 25, 2022    Total time includes face to face time and non-face to face time.  Chart review before the visit, the actual patient visit, and time spent on documentation in the EMR after the visit.  Chart review/prep, review of other providers' records, Independent review of imagings/labs ,  time for history/examination , pt's counseling/education, ordering, prescribing, treatment plan discussed with patient, and care coordination.              Please note that this dictation was created using voice recognition software. I have made every reasonable attempt to correct obvious errors, but I expect that there are errors of grammar and possibly content that I did not discover before finalizing the note.    Due to the word recognition system there will be spelling errors and word recognition errors that have no clinical impact on the intent of the note and treatment of the patient. I will attempt to correct the errors as the visit progresses however there will be missed attempts and I do reserve the right to correct the errors at any time in the future    Antony Alonzo MD  Internal Medicine  [facility]         [1]   Current Outpatient Medications on File Prior to Visit   Medication Sig Dispense Refill    rivaroxaban (XARELTO) 10 MG Tab tablet Take 1 Tablet by mouth every day at 6 PM. To thin blood, prevent clots 100 Tablet 3    glimepiride (AMARYL) 4 MG Tab Take 1 Tablet by mouth 2 times a day. 200 Tablet 3    levothyroxine (SYNTHROID) 125 MCG Tab Take 1 Tablet by mouth every morning on an empty stomach. 90 Tablet 3    lisinopril (PRINIVIL) 2.5 MG Tab Take 1 Tablet by mouth every  day. 100 Tablet 3    metformin (GLUCOPHAGE) 1000 MG tablet Take 1 Tablet by mouth 2 times a day with meals. 200 Tablet 3    pioglitazone (ACTOS) 15 MG Tab Take 1 Tablet by mouth every day. 100 Tablet 3    simvastatin (ZOCOR) 20 MG Tab Take 1 Tablet by mouth every evening. 100 Tablet 3    vitamin D2, Ergocalciferol, (DRISDOL) 1.25 MG (32827 UT) Cap capsule Take 1 Capsule by mouth every 7 days. 12 Capsule 3    ofloxacin (OCUFLOX) 0.3 % Solution       prednisoLONE acetate (PRED FORTE) 1 % Suspension 1 DROP RIGHT EYE FOUR TIMES PER DAY START TWO DAYS PRIOR TO SURGERY AND CONTINUE AS DIRECTED      FREESTYLE LITE strip CHECK BLOOD SUGAR 1X PER DAY AND AS NEEDED FOR SIGN AND SYMPTOMS OF HIGH OR LOW SUGAR 100 Strip 5    Lancets Per insurance coverage. Check blood sugar 1x per day and prn ssx of high or low sugar 200 Each 0    fluocinonide (LIDEX) 0.05 % external solution Apply to affected areas of scalp BID 60 mL 3    Blood Glucose Meter Kit Freestyle Freedom Lite.  Check blood sugar  1x per day and prn ssx of high or low sugar 1 Kit 0    Alcohol Swabs Per insurance coverage. Wipe site with prep pad prior to injection 200 Each 6     No current facility-administered medications on file prior to visit.   [2]   Current Outpatient Medications Ordered in Epic   Medication Sig Dispense Refill    rivaroxaban (XARELTO) 10 MG Tab tablet Take 1 Tablet by mouth every day at 6 PM. To thin blood, prevent clots 100 Tablet 3    glimepiride (AMARYL) 4 MG Tab Take 1 Tablet by mouth 2 times a day. 200 Tablet 3    levothyroxine (SYNTHROID) 125 MCG Tab Take 1 Tablet by mouth every morning on an empty stomach. 90 Tablet 3    lisinopril (PRINIVIL) 2.5 MG Tab Take 1 Tablet by mouth every day. 100 Tablet 3    metformin (GLUCOPHAGE) 1000 MG tablet Take 1 Tablet by mouth 2 times a day with meals. 200 Tablet 3    pioglitazone (ACTOS) 15 MG Tab Take 1 Tablet by mouth every day. 100 Tablet 3    simvastatin (ZOCOR) 20 MG Tab Take 1 Tablet by mouth every  evening. 100 Tablet 3    vitamin D2, Ergocalciferol, (DRISDOL) 1.25 MG (85601 UT) Cap capsule Take 1 Capsule by mouth every 7 days. 12 Capsule 3    ofloxacin (OCUFLOX) 0.3 % Solution       prednisoLONE acetate (PRED FORTE) 1 % Suspension 1 DROP RIGHT EYE FOUR TIMES PER DAY START TWO DAYS PRIOR TO SURGERY AND CONTINUE AS DIRECTED      FREESTYLE LITE strip CHECK BLOOD SUGAR 1X PER DAY AND AS NEEDED FOR SIGN AND SYMPTOMS OF HIGH OR LOW SUGAR 100 Strip 5    Lancets Per insurance coverage. Check blood sugar 1x per day and prn ssx of high or low sugar 200 Each 0    fluocinonide (LIDEX) 0.05 % external solution Apply to affected areas of scalp BID 60 mL 3    Blood Glucose Meter Kit Freestyle Freedom Lite.  Check blood sugar  1x per day and prn ssx of high or low sugar 1 Kit 0    Alcohol Swabs Per insurance coverage. Wipe site with prep pad prior to injection 200 Each 6     No current Epic-ordered facility-administered medications on file.   [3]   Past Medical History:  Diagnosis Date    Asthma     albuterol inhaler    Cancer (HCC) 2021    Kidney    Deep vein thrombosis (HCC) [I82.409] 12/9/2016    Dental disorder     has 4 teeth    Dyslipidemia 1/12/2013    Former smoker 12/7/2016    Heart burn     High cholesterol     Hypertension     Hypothyroidism 1/12/2013    Low vitamin D level 5/3/2018    Neuropathy     left hand    Obesity (BMI 30-39.9) 5/3/2018    Renal disorder     renal mass    Runny nose     chronic, allergies    Snoring     mild    Type II or unspecified type diabetes mellitus without mention of complication, uncontrolled 1/12/2013   [4]   Past Surgical History:  Procedure Laterality Date    CERVICAL DISK AND FUSION ANTERIOR  1/11/2013    Performed by Fred Post M.D. at SURGERY [facility] ORS    CERVICAL DECOMPRESSION POSTERIOR  1/11/2013    Performed by Fred Post M.D. at SURGERY [facility]   [5]   Social History  Tobacco Use   Smoking Status Former    Current packs/day: 0.00    Types: Cigarettes     Start date: 1967    Quit date: 2007    Years since quittin.4   Smokeless Tobacco Never   Tobacco Comments    off and on smoker

## 2025-06-26 NOTE — ASSESSMENT & PLAN NOTE
This is chronic.  The patient is taking simvastatin daily.  Tolerating medication well.  Lab test result reviewed with the patient.

## 2025-06-26 NOTE — ASSESSMENT & PLAN NOTE
This is chronic condition.  Patient presently taking glimepiride 4 Mg twice daily, metformin 1000 Mg twice daily and Actos 15 Mg daily.  The patient declined Jardiance due to high cost of the medication.  Recent A1c 0.7%.  The patient denies significant hypo or hyperglycemia.  No other side effects reported.

## 2025-06-26 NOTE — ASSESSMENT & PLAN NOTE
Chronic condition.  The patient would osteoarthritis of both knees.  He is walking using a cane.  The patient denies recent trauma or injury denies motor weakness or paresthesia.

## 2025-07-22 DIAGNOSIS — E11.42 DIABETIC POLYNEUROPATHY ASSOCIATED WITH TYPE 2 DIABETES MELLITUS (HCC): Chronic | ICD-10-CM

## 2025-07-22 RX ORDER — GLIMEPIRIDE 4 MG/1
4 TABLET ORAL 2 TIMES DAILY
Qty: 200 TABLET | Refills: 3 | Status: SHIPPED | OUTPATIENT
Start: 2025-07-22

## 2025-07-22 RX ORDER — GLIMEPIRIDE 4 MG/1
4 TABLET ORAL 2 TIMES DAILY
Qty: 180 TABLET | Refills: 3 | Status: SHIPPED | OUTPATIENT
Start: 2025-07-22 | End: 2025-07-22

## 2025-08-26 RX ORDER — FLUOCINONIDE TOPICAL SOLUTION USP, 0.05% 0.5 MG/ML
SOLUTION TOPICAL
Qty: 60 ML | Refills: 3 | Status: SHIPPED | OUTPATIENT
Start: 2025-08-26